# Patient Record
Sex: FEMALE | Race: WHITE | Employment: FULL TIME
[De-identification: names, ages, dates, MRNs, and addresses within clinical notes are randomized per-mention and may not be internally consistent; named-entity substitution may affect disease eponyms.]

---

## 2017-07-01 ENCOUNTER — HEALTH MAINTENANCE LETTER (OUTPATIENT)
Age: 22
End: 2017-07-01

## 2017-07-18 ENCOUNTER — OFFICE VISIT (OUTPATIENT)
Dept: FAMILY MEDICINE | Facility: CLINIC | Age: 22
End: 2017-07-18

## 2017-07-18 VITALS
BODY MASS INDEX: 28.76 KG/M2 | OXYGEN SATURATION: 98 % | SYSTOLIC BLOOD PRESSURE: 112 MMHG | HEIGHT: 65 IN | HEART RATE: 85 BPM | DIASTOLIC BLOOD PRESSURE: 76 MMHG | TEMPERATURE: 98.7 F | WEIGHT: 172.6 LBS | RESPIRATION RATE: 16 BRPM

## 2017-07-18 DIAGNOSIS — Z30.9 ENCOUNTER FOR CONTRACEPTIVE MANAGEMENT, UNSPECIFIED TYPE: ICD-10-CM

## 2017-07-18 DIAGNOSIS — Z11.3 ENCOUNTER FOR SCREENING EXAMINATION FOR SEXUALLY TRANSMITTED DISEASE: Primary | ICD-10-CM

## 2017-07-18 DIAGNOSIS — Z00.00 ROUTINE GENERAL MEDICAL EXAMINATION AT A HEALTH CARE FACILITY: ICD-10-CM

## 2017-07-18 LAB — HCG UR QL: NEGATIVE

## 2017-07-18 RX ORDER — NORETHINDRONE ACETATE AND ETHINYL ESTRADIOL .02; 1 MG/1; MG/1
1 TABLET ORAL
COMMUNITY
Start: 2017-05-24 | End: 2017-07-18

## 2017-07-18 RX ORDER — NORETHINDRONE ACETATE AND ETHINYL ESTRADIOL .02; 1 MG/1; MG/1
1 TABLET ORAL DAILY
Qty: 28 TABLET | Refills: 3 | Status: SHIPPED | OUTPATIENT
Start: 2017-07-18 | End: 2018-02-20

## 2017-07-18 NOTE — PROGRESS NOTES
Preceptor Attestation:   Patient seen and discussed with the resident. Assessment and plan reviewed with resident and agreed upon.   Supervising Physician:  Tatianna Dela Cruz MD  [facility]

## 2017-07-18 NOTE — MR AVS SNAPSHOT
After Visit Summary   7/18/2017    Chelo Garcia    MRN: [id number]           Patient Information     Date Of Birth          1995        Visit Information        Provider Department      7/18/2017 2:00 PM Sena Elise MD [facility]        Today's Diagnoses     Encounter for screening examination for sexually transmitted disease    -  1    Routine general medical examination at a health care facility        Encounter for contraceptive management, unspecified type          Care Instructions    Here is the plan from today's visit    1. Encounter for screening examination for sexually transmitted disease  - Neisseria gonorrhoeae PCR  - Chlamydia trachomatis PCR  - Anti Treponema  - HIV Antigen Antibody Combo  - HCG Qualitative Urine (UPT) ([facility])    2. Routine general medical examination at a health care facility  - TDAP VACCINE (BOOSTRIX)    3. Encounter for contraceptive management, unspecified type        Follow up plan  Follow-up as needed if the back pain and dizziness don't go away.    Thank you for coming to [facility] today.  Lab Testing:  **If you had lab testing today and your results are reassuring or normal they will be mailed to you or sent through [employer] within 7 days.   **If the lab tests need quick action we will call you with the results.  The phone number we will call with results is # [phone] (home) . If this is not the best number please call our clinic and change the number.  Medication Refills:  If you need any refills please call your pharmacy and they will contact us.   If you need to  your refill at a new pharmacy, please contact the new pharmacy directly. The new pharmacy will help you get your medications transferred faster.   Scheduling:  If you have any concerns about today's visit or wish to schedule another appointment please call our office during normal business hours [phone] (8-5:00 M-F)  If a referral was  made to a [facility] and you don't get a call from central scheduling please call [phone].  If a Mammogram was ordered for you at [facility] call [phone] to schedule or change your appointment.  If you had an XRay/CT/Ultrasound/MRI ordered the number is [phone] to schedule or change your radiology appointment.   Medical Concerns:  If you have urgent medical concerns please call [phone] at any time of the day.    Preventive Health Recommendations  Female Ages 18 to 25     Yearly exam:     See your health care provider every year in order to  o Review health changes.   o Discuss preventive care.    o Review your medicines if your doctor has prescribed any.      You should be tested each year for STDs (sexually transmitted diseases).       After age 20, talk to your provider about how often you should have cholesterol testing.      Starting at age 21, get a Pap test every three years. If you have an abnormal result, your doctor may have you test more often.      If you are at risk for diabetes, you should have a diabetes test (fasting glucose).     Shots:     Get a flu shot each year.     Get a tetanus shot every 10 years.     Consider getting the shot (vaccine) that prevents cervical cancer (Gardasil).    Nutrition:     Eat at least 5 servings of fruits and vegetables each day.    Eat whole-grain bread, whole-wheat pasta and brown rice instead of white grains and rice.    Talk to your provider about Calcium and Vitamin D.     Lifestyle    Exercise at least 150 minutes a week each week (30 minutes a day, 5 days a week). This will help you control your weight and prevent disease.    Limit alcohol to one drink per day.    No smoking.     Wear sunscreen to prevent skin cancer.    See your dentist every six months for an exam and cleaning.          Follow-ups after your visit        Who to contact     Please call your clinic at [phone] to:    Ask questions about your  "health    Make or cancel appointments    Discuss your medicines    Learn about your test results    Speak to your doctor   If you have compliments or concerns about an experience at your clinic, or if you wish to file a complaint, please contact [facility] Patient Relations at [phone] or email us at [email or web address]         Additional Information About Your Visit        [employer] Information     [employer] gives you secure access to your electronic health record. If you see a primary care provider, you can also send messages to your care team and make appointments. If you have questions, please call your primary care clinic.  If you do not have a primary care provider, please call [phone] and they will assist you.      [employer] is an electronic gateway that provides easy, online access to your medical records. With [employer], you can request a clinic appointment, read your test results, renew a prescription or communicate with your care team.     To access your existing account, please contact your [facility] or call [phone] for assistance.        Care EveryWhere ID     This is your Care EveryWhere ID. This could be used by other organizations to access your [facility] medical records  [id number]        Your Vitals Were     Pulse Temperature Respirations Height Pulse Oximetry BMI (Body Mass Index)    85 98.7  F (37.1  C) (Oral) 16 5' 4.5\" (163.8 cm) 98% 29.17 kg/m2       Blood Pressure from Last 3 Encounters:   07/18/17 112/76   07/28/15 122/73   06/11/13 132/85    Weight from Last 3 Encounters:   07/18/17 172 lb 9.6 oz (78.3 kg)   07/28/15 170 lb 9.6 oz (77.4 kg)   06/11/13 169 lb (76.7 kg) (93 %)*     * Growth percentiles are based on CDC 2-20 Years data.              Today, you had the following     No orders found for display         Today's Medication Changes          These changes are accurate as of: 7/18/17  3:26 PM.  If you have " any questions, ask your nurse or doctor.               Stop taking these medicines if you haven't already. Please contact your care team if you have questions.     etonogestrel 68 MG Impl   Commonly known as:  IMPLANON/NEXPLANON   Stopped by:  Sena Elise MD                    Primary Care Provider Office Phone # Fax #    Berkley Zhang [phone] [address] [facility] [address]        Equal Access to Services     [facility]: Hadii aad ku hadasho Soomaali, waaxda luqadaha, qaybta kaalmada adeegyada, waxay idiin hayaan adeeg karyna cuellar . So [facility] [phone].    ATENCIÓN: Si habla español, tiene a easley disposición servicios gratuitos de asistencia lingüística. [facility] [phone].    We comply with applicable federal civil rights laws and [place] laws. We do not discriminate on the basis of race, color, national origin, age, disability sex, sexual orientation or gender identity.            Thank you!     Thank you for choosing [facility]  for your care. Our goal is always to provide you with excellent care. Hearing back from our patients is one way we can continue to improve our services. Please take a few minutes to complete the written survey that you may receive in the mail after your visit with us. Thank you!             Your Updated Medication List - Protect others around you: Learn how to safely use, store and throw away your medicines at www.disposemymeds.org.          This list is accurate as of: 7/18/17  3:26 PM.  Always use your most recent med list.                   Brand Name Dispense Instructions for use Diagnosis    MICROGESTIN 1-20 MG-MCG per tablet   Generic drug:  norethindrone-ethinyl estradiol      Take 1 tablet by mouth

## 2017-07-18 NOTE — PATIENT INSTRUCTIONS
Here is the plan from today's visit    1. Encounter for screening examination for sexually transmitted disease  - Neisseria gonorrhoeae PCR  - Chlamydia trachomatis PCR  - Anti Treponema  - HIV Antigen Antibody Combo  - HCG Qualitative Urine (UPT) ([facility])    2. Routine general medical examination at a health care facility  - TDAP VACCINE (BOOSTRIX)    3. Encounter for contraceptive management, unspecified type        Follow up plan  Follow-up as needed if the back pain and dizziness don't go away.    Thank you for coming to [facility] today.  Lab Testing:  **If you had lab testing today and your results are reassuring or normal they will be mailed to you or sent through [employer] within 7 days.   **If the lab tests need quick action we will call you with the results.  The phone number we will call with results is # [phone] (home) . If this is not the best number please call our clinic and change the number.  Medication Refills:  If you need any refills please call your pharmacy and they will contact us.   If you need to  your refill at a new pharmacy, please contact the new pharmacy directly. The new pharmacy will help you get your medications transferred faster.   Scheduling:  If you have any concerns about today's visit or wish to schedule another appointment please call our office during normal business hours [phone] (8-5:00 M-F)  If a referral was made to a [facility] and you don't get a call from central scheduling please call [phone].  If a Mammogram was ordered for you at [facility] call [phone] to schedule or change your appointment.  If you had an XRay/CT/Ultrasound/MRI ordered the number is [phone] to schedule or change your radiology appointment.   Medical Concerns:  If you have urgent medical concerns please call [phone] at any time of the day.    Preventive Health Recommendations  Female Ages 18 to 25     Yearly exam:     See your  health care provider every year in order to  o Review health changes.   o Discuss preventive care.    o Review your medicines if your doctor has prescribed any.      You should be tested each year for STDs (sexually transmitted diseases).       After age 20, talk to your provider about how often you should have cholesterol testing.      Starting at age 21, get a Pap test every three years. If you have an abnormal result, your doctor may have you test more often.      If you are at risk for diabetes, you should have a diabetes test (fasting glucose).     Shots:     Get a flu shot each year.     Get a tetanus shot every 10 years.     Consider getting the shot (vaccine) that prevents cervical cancer (Gardasil).    Nutrition:     Eat at least 5 servings of fruits and vegetables each day.    Eat whole-grain bread, whole-wheat pasta and brown rice instead of white grains and rice.    Talk to your provider about Calcium and Vitamin D.     Lifestyle    Exercise at least 150 minutes a week each week (30 minutes a day, 5 days a week). This will help you control your weight and prevent disease.    Limit alcohol to one drink per day.    No smoking.     Wear sunscreen to prevent skin cancer.    See your dentist every six months for an exam and cleaning.

## 2017-07-18 NOTE — PROGRESS NOTES
Female Physical Note          HPI         Concerns today: STI testing. Wants to talk about back pain and also has random bouts of nausea.      Nausea/Dizziness:  Has occasional bouts of significant nausea that she feels in her throat. Often feels like she might throw up. Has been ongoing for years. No noticed pattern of triggers. Drinking water helps. Also has frequent motion sickness.     Back Pain:  Lower back pain. Ongoing for years. Often wakes up with it. Does have a back brace and heating pad. No heavy lifting at work. No obvious mechanism. Soreness and stiffness; paralumbar bilaterally.    STI testing:  Sexually active with male partners. Inconsistent condom use. No dysuria, discharge, irritation, bleeding. LMP ~1 month ago    Patient Active Problem List   Diagnosis     Proteinuria     Nexplanon in place       Past Medical History:   Diagnosis Date     Proteinuria     small protein on screening UA - recheck       Previous Medical Care - Also got care at [facility].     Family History   Problem Relation Age of Onset     Ovarian Cancer Mother      Breast Cancer Other               Review of Systems:     Review of Systems:  CONSTITUTIONAL: NEGATIVE for fever, chills, change in weight  INTEGUMENTARY/SKIN: NEGATIVE for worrisome rashes, moles or lesions  EYES: NEGATIVE for vision changes or irritation  ENT/MOUTH: NEGATIVE for ear, mouth and throat problems  RESP: NEGATIVE for significant cough or SOB  BREAST: NEGATIVE for masses, tenderness or discharge  CV: NEGATIVE for chest pain, palpitations or peripheral edema  GI: NEGATIVE for nausea, abdominal pain, heartburn, or change in bowel habits  : NEGATIVE for frequency, dysuria, or hematuria  MUSCULOSKELETAL: NEGATIVE for significant arthralgias or myalgia  NEURO: NEGATIVE for weakness, or paresthesias. POSITIVE FOR DIZZINESS/nausea.  ENDOCRINE: NEGATIVE for temperature intolerance, skin/hair changes  HEME/ALLERGY: NEGATIVE for bleeding problems  PSYCHIATRIC:  "NEGATIVE for changes in mood or affect           Social History     Social History     Social History     Marital status: Single     Spouse name: N/A     Number of children: N/A     Years of education: N/A     Occupational History     Not on file.     Social History Main Topics     Smoking status: Never Smoker     Smokeless tobacco: Not on file     Alcohol use Not on file     Drug use: Not on file     Sexual activity: Yes     Partners: Male     Other Topics Concern     Not on file     Social History Narrative    Graduated from [employer] major, 2017. 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       Marital Status: Single  Who lives in your household? Lives with grandparents.    Has anyone hurt you physically, for example by pushing, hitting, slapping or kicking you or forcing you to have sex? Denies  Do you feel threatened or controlled by a partner, ex-partner or anyone in your life? Denies    Sexual Health     Sexual concerns: No   STI History: Neg  Pregnancy History: No obstetric history on file. Never been pregnant.  LMP No LMP recorded. Patient has had an injection. Menses: q 4-5 days.   Last Pap Smear Date: Has had two (one before Nexplanon, one last year). All NIL.  Abnormal Pap History: None    Birth control: currently on pill.     Recommended Screening     HIV screening:  Recommended and patient accepted testing.         Physical Exam:     Vitals: /76  Pulse 85  Temp 98.7  F (37.1  C) (Oral)  Resp 16  Ht 5' 4.5\" (163.8 cm)  Wt 172 lb 9.6 oz (78.3 kg)  SpO2 98%  BMI 29.17 kg/m2  BMI= Body mass index is 29.17 kg/(m^2).     GENERAL: healthy, alert and no distress  EYES: Eyes grossly normal to inspection, extraocular movements - intact, and PERRL  HENT: ear canals- normal; TMs- normal; Nose- normal; Mouth- no ulcers, no lesions  RESP: lungs clear to auscultation - no rales, no rhonchi, no wheezes  CV: regular rates and rhythm, normal S1 S2, no S3 or S4 and no murmur, no " click or rub -  ABDOMEN: soft, no tenderness, no  hepatosplenomegaly, no masses, normal bowel sounds  : deferred today  MS: extremities- no gross deformities noted, no edema  SKIN: no suspicious lesions, no rashes  NEURO: strength and tone- normal, sensory exam- grossly normal, mentation- intact, speech- normal, reflexes- symmetric  BACK: no CVA tenderness, no paralumbar tenderness. No obvious spinal curvature.   PSYCH: Alert and oriented times 3; speech- coherent , normal rate and volume; able to articulate logical thoughts, able to abstract reason, no tangential thoughts, no hallucinations or delusions, affect- normal      Assessment and Plan      Chelo was seen today for physical.    Diagnoses and all orders for this visit:    Encounter for screening examination for sexually transmitted disease  -     Neisseria gonorrhoeae PCR  -     Chlamydia trachomatis PCR  -     Anti Treponema  -     HIV Antigen Antibody Combo  -     HCG Qualitative Urine (UPT) ([facility])  -     ADMIN VACCINE, INITIAL  -     TDAP VACCINE (BOOSTRIX)    Routine general medical examination at a health care facility  -     Cancel: TDAP VACCINE (BOOSTRIX)    Encounter for contraceptive management, unspecified type  -     norethindrone-ethinyl estradiol (MICROGESTIN) 1-20 MG-MCG per tablet; Take 1 tablet by mouth daily        1. Health Care Maintenance: Normal Physical Exam. TDAP today.  2. STI testing: G/C, HIV, RPR, and UPT ordered for today.      1. Routine follow up in one year.  2.Contraception: OCP-see med list    Options for treatment and follow-up care were reviewed with the patient . Chelo Garcia and/or guardian engaged in the decision making process and verbalized understanding of the options discussed and agreed with the final plan.    Ethan VENTURA, M4, am serving as a scribe; to document services personally performed by Dr.Jennifer Lanie MD and resident Dr. Leandro Elise MD based on data collection and providers  statements to me. The above assessment and plan is the product of our joint decision making.    The medical student acted as scribe and the encounter documented above was completely performed by myself and the documentation reflects the work I have performed today.    Sena Elise MD  [facility],  G2 Family Medicine Resident  #[phone]

## 2017-07-19 LAB
C TRACH DNA SPEC QL NAA+PROBE: NORMAL
HIV 1+2 AB+HIV1 P24 AG SERPL QL IA: NORMAL
N GONORRHOEA DNA SPEC QL NAA+PROBE: NORMAL
SPECIMEN SOURCE: NORMAL
SPECIMEN SOURCE: NORMAL
T PALLIDUM IGG+IGM SER QL: NEGATIVE

## 2017-07-24 PROBLEM — M54.50 CHRONIC MIDLINE LOW BACK PAIN WITHOUT SCIATICA: Status: ACTIVE | Noted: 2017-07-24

## 2017-07-24 PROBLEM — G89.29 CHRONIC MIDLINE LOW BACK PAIN WITHOUT SCIATICA: Status: ACTIVE | Noted: 2017-07-24

## 2017-07-24 PROBLEM — E66.3 OVERWEIGHT: Status: ACTIVE | Noted: 2017-07-24

## 2018-01-29 ENCOUNTER — OFFICE VISIT (OUTPATIENT)
Dept: FAMILY MEDICINE | Facility: CLINIC | Age: 23
End: 2018-01-29
Payer: COMMERCIAL

## 2018-01-29 VITALS
HEART RATE: 93 BPM | HEIGHT: 64 IN | WEIGHT: 179.75 LBS | SYSTOLIC BLOOD PRESSURE: 121 MMHG | TEMPERATURE: 98.2 F | DIASTOLIC BLOOD PRESSURE: 85 MMHG | OXYGEN SATURATION: 99 % | BODY MASS INDEX: 30.69 KG/M2 | RESPIRATION RATE: 18 BRPM

## 2018-01-29 DIAGNOSIS — M54.50 CHRONIC MIDLINE LOW BACK PAIN WITHOUT SCIATICA: ICD-10-CM

## 2018-01-29 DIAGNOSIS — Z11.3 SCREEN FOR STD (SEXUALLY TRANSMITTED DISEASE): ICD-10-CM

## 2018-01-29 DIAGNOSIS — F41.9 ANXIETY: ICD-10-CM

## 2018-01-29 DIAGNOSIS — Z80.41 FAMILY HISTORY OF MALIGNANT NEOPLASM OF OVARY: ICD-10-CM

## 2018-01-29 DIAGNOSIS — E66.3 OVERWEIGHT: ICD-10-CM

## 2018-01-29 DIAGNOSIS — K64.4 RESIDUAL HEMORRHOIDAL SKIN TAGS: ICD-10-CM

## 2018-01-29 DIAGNOSIS — Z13.6 ENCOUNTER FOR SCREENING FOR CARDIOVASCULAR DISORDERS: ICD-10-CM

## 2018-01-29 DIAGNOSIS — Z13.1 SCREENING FOR DIABETES MELLITUS: ICD-10-CM

## 2018-01-29 DIAGNOSIS — R80.9 PROTEINURIA, UNSPECIFIED TYPE: ICD-10-CM

## 2018-01-29 DIAGNOSIS — F33.9 RECURRENT MAJOR DEPRESSIVE DISORDER, REMISSION STATUS UNSPECIFIED (H): ICD-10-CM

## 2018-01-29 DIAGNOSIS — G89.29 CHRONIC MIDLINE LOW BACK PAIN WITHOUT SCIATICA: ICD-10-CM

## 2018-01-29 DIAGNOSIS — Z13.0 SCREENING, ANEMIA, DEFICIENCY, IRON: ICD-10-CM

## 2018-01-29 DIAGNOSIS — Z00.00 ROUTINE GENERAL MEDICAL EXAMINATION AT A HEALTH CARE FACILITY: Primary | ICD-10-CM

## 2018-01-29 LAB
ALBUMIN SERPL-MCNC: 3.4 G/DL (ref 3.4–5)
ALBUMIN UR-MCNC: NEGATIVE MG/DL
ALP SERPL-CCNC: 80 U/L (ref 40–150)
ALT SERPL W P-5'-P-CCNC: 16 U/L (ref 0–50)
ANION GAP SERPL CALCULATED.3IONS-SCNC: 8 MMOL/L (ref 3–14)
APPEARANCE UR: CLEAR
AST SERPL W P-5'-P-CCNC: 17 U/L (ref 0–45)
BASOPHILS # BLD AUTO: 0 10E9/L (ref 0–0.2)
BASOPHILS NFR BLD AUTO: 0.2 %
BILIRUB SERPL-MCNC: 0.2 MG/DL (ref 0.2–1.3)
BILIRUB UR QL STRIP: NEGATIVE
BUN SERPL-MCNC: 12 MG/DL (ref 7–30)
CALCIUM SERPL-MCNC: 8.5 MG/DL (ref 8.5–10.1)
CHLORIDE SERPL-SCNC: 111 MMOL/L (ref 94–109)
CHOLEST SERPL-MCNC: 215 MG/DL
CO2 SERPL-SCNC: 21 MMOL/L (ref 20–32)
COLOR UR AUTO: YELLOW
CREAT SERPL-MCNC: 0.75 MG/DL (ref 0.52–1.04)
CREAT UR-MCNC: 236 MG/DL
DIFFERENTIAL METHOD BLD: NORMAL
EOSINOPHIL # BLD AUTO: 0 10E9/L (ref 0–0.7)
EOSINOPHIL NFR BLD AUTO: 0.7 %
ERYTHROCYTE [DISTWIDTH] IN BLOOD BY AUTOMATED COUNT: 14.7 % (ref 10–15)
GFR SERPL CREATININE-BSD FRML MDRD: >90 ML/MIN/1.7M2
GLUCOSE SERPL-MCNC: 86 MG/DL (ref 70–99)
GLUCOSE UR STRIP-MCNC: NEGATIVE MG/DL
HBA1C MFR BLD: 5.1 % (ref 4.3–6)
HCT VFR BLD AUTO: 39.1 % (ref 35–47)
HCV AB SERPL QL IA: NONREACTIVE
HDLC SERPL-MCNC: 68 MG/DL
HGB BLD-MCNC: 12.5 G/DL (ref 11.7–15.7)
HGB UR QL STRIP: NEGATIVE
KETONES UR STRIP-MCNC: NEGATIVE MG/DL
LDLC SERPL CALC-MCNC: 127 MG/DL
LEUKOCYTE ESTERASE UR QL STRIP: NEGATIVE
LYMPHOCYTES # BLD AUTO: 1.8 10E9/L (ref 0.8–5.3)
LYMPHOCYTES NFR BLD AUTO: 30.3 %
MCH RBC QN AUTO: 27.1 PG (ref 26.5–33)
MCHC RBC AUTO-ENTMCNC: 32 G/DL (ref 31.5–36.5)
MCV RBC AUTO: 85 FL (ref 78–100)
MICROALBUMIN UR-MCNC: 10 MG/L
MICROALBUMIN/CREAT UR: 4.41 MG/G CR (ref 0–25)
MONOCYTES # BLD AUTO: 0.3 10E9/L (ref 0–1.3)
MONOCYTES NFR BLD AUTO: 4.3 %
NEUTROPHILS # BLD AUTO: 3.9 10E9/L (ref 1.6–8.3)
NEUTROPHILS NFR BLD AUTO: 64.5 %
NITRATE UR QL: NEGATIVE
NONHDLC SERPL-MCNC: 147 MG/DL
PH UR STRIP: 5.5 PH (ref 5–7)
PLATELET # BLD AUTO: 247 10E9/L (ref 150–450)
POTASSIUM SERPL-SCNC: 4.1 MMOL/L (ref 3.4–5.3)
PROT SERPL-MCNC: 7.1 G/DL (ref 6.8–8.8)
RBC # BLD AUTO: 4.61 10E12/L (ref 3.8–5.2)
SODIUM SERPL-SCNC: 140 MMOL/L (ref 133–144)
SOURCE: NORMAL
SP GR UR STRIP: >1.03 (ref 1–1.03)
SPECIMEN SOURCE: ABNORMAL
TRIGL SERPL-MCNC: 98 MG/DL
TSH SERPL DL<=0.005 MIU/L-ACNC: 0.96 MU/L (ref 0.4–4)
UROBILINOGEN UR STRIP-ACNC: 0.2 EU/DL (ref 0.2–1)
WBC # BLD AUTO: 6.1 10E9/L (ref 4–11)
WET PREP SPEC: ABNORMAL

## 2018-01-29 PROCEDURE — 86780 TREPONEMA PALLIDUM: CPT | Performed by: FAMILY MEDICINE

## 2018-01-29 PROCEDURE — 80061 LIPID PANEL: CPT | Performed by: FAMILY MEDICINE

## 2018-01-29 PROCEDURE — 87210 SMEAR WET MOUNT SALINE/INK: CPT | Performed by: FAMILY MEDICINE

## 2018-01-29 PROCEDURE — 87340 HEPATITIS B SURFACE AG IA: CPT | Performed by: FAMILY MEDICINE

## 2018-01-29 PROCEDURE — 36415 COLL VENOUS BLD VENIPUNCTURE: CPT | Performed by: FAMILY MEDICINE

## 2018-01-29 PROCEDURE — 99395 PREV VISIT EST AGE 18-39: CPT | Performed by: FAMILY MEDICINE

## 2018-01-29 PROCEDURE — 86803 HEPATITIS C AB TEST: CPT | Performed by: FAMILY MEDICINE

## 2018-01-29 PROCEDURE — 87389 HIV-1 AG W/HIV-1&-2 AB AG IA: CPT | Performed by: FAMILY MEDICINE

## 2018-01-29 PROCEDURE — 87491 CHLMYD TRACH DNA AMP PROBE: CPT | Performed by: FAMILY MEDICINE

## 2018-01-29 PROCEDURE — 99213 OFFICE O/P EST LOW 20 MIN: CPT | Mod: 25 | Performed by: FAMILY MEDICINE

## 2018-01-29 PROCEDURE — 86706 HEP B SURFACE ANTIBODY: CPT | Performed by: FAMILY MEDICINE

## 2018-01-29 PROCEDURE — 80050 GENERAL HEALTH PANEL: CPT | Performed by: FAMILY MEDICINE

## 2018-01-29 PROCEDURE — 83036 HEMOGLOBIN GLYCOSYLATED A1C: CPT | Performed by: FAMILY MEDICINE

## 2018-01-29 PROCEDURE — 87591 N.GONORRHOEAE DNA AMP PROB: CPT | Performed by: FAMILY MEDICINE

## 2018-01-29 PROCEDURE — 82043 UR ALBUMIN QUANTITATIVE: CPT | Performed by: FAMILY MEDICINE

## 2018-01-29 PROCEDURE — 81003 URINALYSIS AUTO W/O SCOPE: CPT | Performed by: FAMILY MEDICINE

## 2018-01-29 ASSESSMENT — ANXIETY QUESTIONNAIRES
6. BECOMING EASILY ANNOYED OR IRRITABLE: NOT AT ALL
GAD7 TOTAL SCORE: 4
5. BEING SO RESTLESS THAT IT IS HARD TO SIT STILL: NOT AT ALL
1. FEELING NERVOUS, ANXIOUS, OR ON EDGE: SEVERAL DAYS
3. WORRYING TOO MUCH ABOUT DIFFERENT THINGS: SEVERAL DAYS
2. NOT BEING ABLE TO STOP OR CONTROL WORRYING: SEVERAL DAYS
7. FEELING AFRAID AS IF SOMETHING AWFUL MIGHT HAPPEN: NOT AT ALL
IF YOU CHECKED OFF ANY PROBLEMS ON THIS QUESTIONNAIRE, HOW DIFFICULT HAVE THESE PROBLEMS MADE IT FOR YOU TO DO YOUR WORK, TAKE CARE OF THINGS AT HOME, OR GET ALONG WITH OTHER PEOPLE: SOMEWHAT DIFFICULT

## 2018-01-29 ASSESSMENT — PATIENT HEALTH QUESTIONNAIRE - PHQ9
5. POOR APPETITE OR OVEREATING: SEVERAL DAYS
SUM OF ALL RESPONSES TO PHQ QUESTIONS 1-9: 13
SUM OF ALL RESPONSES TO PHQ QUESTIONS 1-9: 13
10. IF YOU CHECKED OFF ANY PROBLEMS, HOW DIFFICULT HAVE THESE PROBLEMS MADE IT FOR YOU TO DO YOUR WORK, TAKE CARE OF THINGS AT HOME, OR GET ALONG WITH OTHER PEOPLE: SOMEWHAT DIFFICULT

## 2018-01-29 NOTE — MR AVS SNAPSHOT
"              After Visit Summary   1/29/2018    Chelo Garcia    MRN: [id number]           Patient Information     Date Of Birth          1995        Visit Information        Provider Department      1/29/2018 9:40 AM Andria Pearce MD [facility]        Today's Diagnoses     Routine general medical examination at a health care facility    -  1    Overweight        Chronic midline low back pain without sciatica        Proteinuria, unspecified type        Recurrent major depressive disorder, remission status unspecified (H)        Anxiety        Screen for STD (sexually transmitted disease)        Screening, anemia, deficiency, iron        Screening for diabetes mellitus        Encounter for screening for cardiovascular disorders        Residual hemorrhoidal skin tags        Family history of malignant neoplasm of ovary          Care Instructions    Have a residual external hemorrhoid skin tag  Should shrink on its on or remain that way   Is not a wart  Monitor for worsening  If remains worried or gets symptoms see colorectal, referral placed  Advise decreased sitting, avoid being on toilet long, increase fiber and fluid in diet , use wet wipes after a BM  Labs today   Std screen  Pap due 2019  breast exam normal  consider meds and therapy   Referral placed  Increase aerobic activity to 30 min 5 days a week  Eat healthy   Consider therapy - CBT   Konrad Stewart's card given to patient. Encouraged follow up with Konrad Stewart, Behavioral Health Consultant.  Vitamin D 2000 IU daily , also recommend a multivitamin, and fish oil daily  Avoid Caffeine and alcohol   Valerian root extract for relaxation and sleep OR Melatonin at bedtime.    \"The Chemistry of Calm\" by Nick Austin . Also \"Chemistry of Mandy\" book and Work book by same author  \"Hope and Help for your Nerves\" by Teresa Bryan   If you are experiencing a mental health crisis call the crisis response provider in your community :  Alin: Adult " [phone] children [phone]  Iron: Adult [phone] children [phone]  In a life threatening emergency , call 911     Otherwise if need help for urgent mental health please go to the Behavioral emergency Center at [facility]     Preventive Health Recommendations  Female Ages 18 to 25     Yearly exam:     See your health care provider every year in order to  o Review health changes.   o Discuss preventive care.    o Review your medicines if your doctor has prescribed any.      You should be tested each year for STDs (sexually transmitted diseases).       After age 20, talk to your provider about how often you should have cholesterol testing.      Starting at age 21, get a Pap test every three years. If you have an abnormal result, your doctor may have you test more often.      If you are at risk for diabetes, you should have a diabetes test (fasting glucose).     Shots:     Get a flu shot each year.     Get a tetanus shot every 10 years.     Consider getting the shot (vaccine) that prevents cervical cancer (Gardasil).    Nutrition:     Eat at least 5 servings of fruits and vegetables each day.    Eat whole-grain bread, whole-wheat pasta and brown rice instead of white grains and rice.    Talk to your provider about Calcium and Vitamin D.     Lifestyle    Exercise at least 150 minutes a week each week (30 minutes a day, 5 days a week). This will help you control your weight and prevent disease.    Limit alcohol to one drink per day.    No smoking.     Wear sunscreen to prevent skin cancer.    See your dentist every six months for an exam and cleaning.          Follow-ups after your visit        Additional Services     COLORECTAL SURGERY REFERRAL       Your provider has referred you to: [facility]: [facility] [place] [phone]   [email or web address]  [facility]: [facility] [place] [facility]  [phone]  [email or web address]  [facility]: [facility] [place] [phone]   [email or web address]  [facility]: [facility] [phone]   [email or web address]    Referral Reason(s): Hemorrhoids  Special Concerns: None  This referral is: Elective (week +)  It is OK to leave a message on patient's voicemail.    Please be aware that coverage of these services is subject to the terms and limitations of your health insurance plan.  Call member services at your health plan with any benefit or coverage questions.      Please bring the following with you to your appointment:    (1) Any X-Rays, CTs or MRIs which have been performed.  Contact the facility where they were done to arrange for  prior to your scheduled appointment.    (2) List of current medications  (3) This referral request   (4) Any documents/labs given to you for this referral            MENTAL HEALTH REFERRAL  - Adult; Psychiatry and Medication Management, Outpatient Treatment; Individual/Couples/Family/Group Therapy/Health Psychology; [facility]: [facility] [phone]; We will contact you to schedule the appointmen...       All scheduling is subject to the client's specific insurance plan & benefits, provider/location availability, and provider clinical specialities.  Please arrive 15 minutes early for your first appointment and bring your completed paperwork.    Please be aware that coverage of these services is subject to the terms and limitations of your health insurance plan.  Call member services at your health plan with any benefit or coverage questions.                            Who to contact     If you have questions or need follow up information about today's clinic visit or your schedule please contact [facility] directly at  "[phone].  Normal or non-critical lab and imaging results will be communicated to you by Shopventoryhart, letter or phone within 4 business days after the clinic has received the results. If you do not hear from us within 7 days, please contact the clinic through D[employer]s Fund[employer] or phone. If you have a critical or abnormal lab result, we will notify you by phone as soon as possible.  Submit refill requests through Cenzic or call your pharmacy and they will forward the refill request to us. Please allow 3 business days for your refill to be completed.          Additional Information About Your Visit        Shopventoryhart Information     Cenzic gives you secure access to your electronic health record. If you see a primary care provider, you can also send messages to your care team and make appointments. If you have questions, please call your primary care clinic.  If you do not have a primary care provider, please call [phone] and they will assist you.        Care EveryWhere ID     This is your Care EveryWhere ID. This could be used by other organizations to access your [place] medical records  [id number]        Your Vitals Were     Pulse Temperature Respirations Height Last Period Pulse Oximetry    93 98.2  F (36.8  C) (Oral) 18 5' 4\" (1.626 m) 12/29/2017 99%    BMI (Body Mass Index)                   30.85 kg/m2            Blood Pressure from Last 3 Encounters:   01/29/18 121/85   07/18/17 112/76   07/28/15 122/73    Weight from Last 3 Encounters:   01/29/18 179 lb 12 oz (81.5 kg)   07/18/17 172 lb 9.6 oz (78.3 kg)   07/28/15 170 lb 9.6 oz (77.4 kg)              We Performed the Following     Albumin Random Urine Quantitative with Creat Ratio     Anti Treponema     CBC with platelets differential     CHLAMYDIA TRACHOMATIS PCR     COLORECTAL SURGERY REFERRAL     Comprehensive metabolic panel     Hemoglobin A1c     Hepatitis B Surface Antibody     Hepatitis B surface antigen     Hepatitis C Screen Reflex to HCV RNA Quant and " Genotype     HIV Antigen Antibody Combo     Lipid panel reflex to direct LDL Fasting     MENTAL HEALTH REFERRAL  - Adult; Psychiatry and Medication Management, Outpatient Treatment; Individual/Couples/Family/Group Therapy/Health Psychology; [facility]: [facility] [phone]; We will contact you to schedule the appointmen...     NEISSERIA GONORRHOEA PCR     OFFICE/OUTPT VISIT,EST,LEVL III     TSH with free T4 reflex     UA reflex to Microscopic and Culture     Wet prep        Primary Care Provider Office Phone # Fax #    Berkley Zhang [phone]       [address] [address]        Equal Access to Services     [facility] Hospital[facility] : Hadii dionne todd Sorusty, waaxda lucecilioadaha, qaybta kaalmada fransisco, joey cuellar . So [facility] [phone].    ATENCIÓN: Si habla español, tiene a easley disposición servicios gratuitos de asistencia lingüística. [facility] [phone].    We comply with applicable federal civil rights laws and [place] laws. We do not discriminate on the basis of race, color, national origin, age, disability, sex, sexual orientation, or gender identity.            Thank you!     Thank you for choosing [facility]  for your care. Our goal is always to provide you with excellent care. Hearing back from our patients is one way we can continue to improve our services. Please take a few minutes to complete the written survey that you may receive in the mail after your visit with us. Thank you!             Your Updated Medication List - Protect others around you: Learn how to safely use, store and throw away your medicines at www.disposemymeds.org.          This list is accurate as of 1/29/18 10:08 AM.  Always use your most recent med list.                   Brand Name Dispense Instructions for use Diagnosis    norethindrone-ethinyl estradiol 1-20 MG-MCG per tablet    MICROGESTIN    28 tablet    Take 1 tablet by  mouth daily    Encounter for contraceptive management, unspecified type

## 2018-01-29 NOTE — PROGRESS NOTES
Jarred Ms. Garcia,  Your results came back and are within acceptable limits. -Normal red blood cell (hgb) levels, normal white blood cell count and normal platelet levels.  -A1C (diabetic test) is normal and indicates that your blood sugar has been in a normal range the last 3 months.  -Urine is normal.  -Yeast vaginal infection test is normal - no signs of a yeast infection.  -Bacterial vaginal infection test is POSITIVE.    ADVISE: treatment with metronidazole vaginal gel  0.75% one applicator nightly x 5 nights. Will send to the pharmacy on file for you   -Trichomonas vaginal infection test is normal - no signs of a trichomonas infection.. If you have any further concerns please do not hesitate to contact us by message, phone or making an appointment.  Have a good day   Dr Ramses BARAHONA
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
## 2018-01-29 NOTE — PATIENT INSTRUCTIONS
"Have a residual external hemorrhoid skin tag  Should shrink on its on or remain that way   Is not a wart  Monitor for worsening  If remains worried or gets symptoms see colorectal, referral placed  Advise decreased sitting, avoid being on toilet long, increase fiber and fluid in diet , use wet wipes after a BM  Labs today   Std screen  Pap due 2019  breast exam normal  consider meds and therapy   Referral placed  Increase aerobic activity to 30 min 5 days a week  Eat healthy   Consider therapy - CBT   Konrad Stewart's card given to patient. Encouraged follow up with Konrad Stewart, Behavioral Health Consultant.  Vitamin D 2000 IU daily , also recommend a multivitamin, and fish oil daily  Avoid Caffeine and alcohol   Valerian root extract for relaxation and sleep OR Melatonin at bedtime.    \"The Chemistry of Calm\" by Nick Austin . Also \"Chemistry of Mandy\" book and Work book by same author  \"Hope and Help for your Nerves\" by Teresa Bryan   If you are experiencing a mental health crisis call the crisis response provider in your community :  [place]: Adult [phone] children [phone]  [place]: Adult [phone] children [phone]  In a life threatening emergency , call 911     Otherwise if need help for urgent mental health please go to the Behavioral emergency Center at [facility]     Preventive Health Recommendations  Female Ages 18 to 25     Yearly exam:     See your health care provider every year in order to  o Review health changes.   o Discuss preventive care.    o Review your medicines if your doctor has prescribed any.      You should be tested each year for STDs (sexually transmitted diseases).       After age 20, talk to your provider about how often you should have cholesterol testing.      Starting at age 21, get a Pap test every three years. If you have an abnormal result, your doctor may have you test more often.      If you are at risk for diabetes, you " should have a diabetes test (fasting glucose).     Shots:     Get a flu shot each year.     Get a tetanus shot every 10 years.     Consider getting the shot (vaccine) that prevents cervical cancer (Gardasil).    Nutrition:     Eat at least 5 servings of fruits and vegetables each day.    Eat whole-grain bread, whole-wheat pasta and brown rice instead of white grains and rice.    Talk to your provider about Calcium and Vitamin D.     Lifestyle    Exercise at least 150 minutes a week each week (30 minutes a day, 5 days a week). This will help you control your weight and prevent disease.    Limit alcohol to one drink per day.    No smoking.     Wear sunscreen to prevent skin cancer.    See your dentist every six months for an exam and cleaning.

## 2018-01-29 NOTE — PROGRESS NOTES
Jarred Ms. Garcia,  Your results came back and are within acceptable limits. -Microalbumin (urine protein) test is normal.  ADVISE: recheck annually. If you have any further concerns please do not hesitate to contact us by message, phone or making an appointment.  Have a good day   Dr Ramses BARAHONA

## 2018-01-30 LAB
C TRACH DNA SPEC QL NAA+PROBE: NEGATIVE
HBV SURFACE AB SERPL IA-ACNC: 4.16 M[IU]/ML
HBV SURFACE AG SERPL QL IA: NONREACTIVE
HIV 1+2 AB+HIV1 P24 AG SERPL QL IA: NONREACTIVE
N GONORRHOEA DNA SPEC QL NAA+PROBE: NEGATIVE
SPECIMEN SOURCE: NORMAL
SPECIMEN SOURCE: NORMAL
T PALLIDUM IGG+IGM SER QL: NEGATIVE

## 2018-01-30 ASSESSMENT — ANXIETY QUESTIONNAIRES: GAD7 TOTAL SCORE: 4

## 2018-01-30 NOTE — PROGRESS NOTES
Jarred Ms. Garcia,  Your results came back and are within acceptable limits. -Chlamydia and gonnohrea tests are normal.. If you have any further concerns please do not hesitate to contact us by message, phone or making an appointment.  Have a good day   Dr Ramses BARAHONA

## 2018-01-30 NOTE — PROGRESS NOTES
Jarred Ms. Garcia,  Some of Your results came back and show you are negative for HIV and Hep B infection  You are not immune to hep b so consider revaccination against Hep B with series of three shots. Can set up appt with the Ma to get this done at your convenience. If you have any further concerns please do not hesitate to contact us by message, phone or making an appointment.  Have a good day   Dr Ramses BARAHONA

## 2018-01-30 NOTE — PROGRESS NOTES
Jarred Ms. Garcia,  Your results came back negative for syphilis. If you have any further concerns please do not hesitate to contact us by message, phone or making an appointment.  Have a good day   Dr Ramses BARAHONA

## 2018-01-30 NOTE — PROGRESS NOTES
Jarred Ms. Garcia,  Your results came back and are within acceptable limits. -Hepatitis C antibody screen test shows no signs of a previous hepatitis C infection.. If you have any further concerns please do not hesitate to contact us by message, phone or making an appointment.  Have a good day   Dr Ramses BARAHONA

## 2018-01-31 ENCOUNTER — MYC MEDICAL ADVICE (OUTPATIENT)
Dept: FAMILY MEDICINE | Facility: CLINIC | Age: 23
End: 2018-01-31

## 2018-01-31 DIAGNOSIS — B96.89 BACTERIAL VAGINOSIS: Primary | ICD-10-CM

## 2018-01-31 DIAGNOSIS — N76.0 BACTERIAL VAGINOSIS: Primary | ICD-10-CM

## 2018-02-01 RX ORDER — METRONIDAZOLE 7.5 MG/G
1 GEL VAGINAL AT BEDTIME
Qty: 25 G | Refills: 0 | Status: SHIPPED | OUTPATIENT
Start: 2018-02-01 | End: 2019-02-01

## 2018-02-01 NOTE — TELEPHONE ENCOUNTER
Im so sorry . I put the note in must have forgotten the metrogel order. I just signed it . Thanks .

## 2018-02-01 NOTE — TELEPHONE ENCOUNTER
Metrogel and pharmacy pended.    Dr. Pearce-Please review and sign if agree.    Thank you!  ASHISH KelseyN, RN

## 2018-02-13 ENCOUNTER — MYC MEDICAL ADVICE (OUTPATIENT)
Dept: FAMILY MEDICINE | Facility: CLINIC | Age: 23
End: 2018-02-13

## 2018-02-13 DIAGNOSIS — N76.0 BV (BACTERIAL VAGINOSIS): Primary | ICD-10-CM

## 2018-02-13 DIAGNOSIS — N89.8 VAGINAL ITCHING: ICD-10-CM

## 2018-02-13 DIAGNOSIS — B96.89 BV (BACTERIAL VAGINOSIS): Primary | ICD-10-CM

## 2018-02-14 RX ORDER — FLUCONAZOLE 150 MG/1
150 TABLET ORAL ONCE
Qty: 1 TABLET | Refills: 0 | Status: SHIPPED | OUTPATIENT
Start: 2018-02-14 | End: 2019-02-01

## 2018-02-14 RX ORDER — METRONIDAZOLE 500 MG/1
500 TABLET ORAL 2 TIMES DAILY
Qty: 14 TABLET | Refills: 0 | Status: SHIPPED | OUTPATIENT
Start: 2018-02-14 | End: 2018-05-04

## 2018-02-14 NOTE — TELEPHONE ENCOUNTER
Take flagyl 500 mg twice a day 7 days followed by one dose of diflucan 150 mg x 1. If not better after hat make an apt for a recheck wet prep . Avoid alcohol while on these meds

## 2018-02-20 DIAGNOSIS — Z30.9 ENCOUNTER FOR CONTRACEPTIVE MANAGEMENT, UNSPECIFIED TYPE: ICD-10-CM

## 2018-02-20 NOTE — TELEPHONE ENCOUNTER
Request for medication refill:    Date of last visit at clinic: 01/29/2018    Please complete refill if appropriate and CLOSE ENCOUNTER.    Closing the encounter signifies the refill is complete.    If refill has been denied, please complete the smart phrase .smirefuse and route it to the [facility] RN TRIAGE pool to inform the patient and the pharmacy.    Peng Thompson, CMA

## 2018-02-21 ENCOUNTER — OFFICE VISIT (OUTPATIENT)
Dept: OBGYN | Facility: CLINIC | Age: 23
End: 2018-02-21
Payer: COMMERCIAL

## 2018-02-21 VITALS
BODY MASS INDEX: 31.07 KG/M2 | HEIGHT: 64 IN | SYSTOLIC BLOOD PRESSURE: 120 MMHG | TEMPERATURE: 99 F | DIASTOLIC BLOOD PRESSURE: 70 MMHG | WEIGHT: 182 LBS

## 2018-02-21 DIAGNOSIS — Z30.9 ENCOUNTER FOR CONTRACEPTIVE MANAGEMENT, UNSPECIFIED TYPE: ICD-10-CM

## 2018-02-21 PROCEDURE — 99202 OFFICE O/P NEW SF 15 MIN: CPT | Performed by: OBSTETRICS & GYNECOLOGY

## 2018-02-21 RX ORDER — NORETHINDRONE ACETATE AND ETHINYL ESTRADIOL .02; 1 MG/1; MG/1
1 TABLET ORAL DAILY
Qty: 84 TABLET | Refills: 3 | Status: SHIPPED | OUTPATIENT
Start: 2018-02-21 | End: 2019-04-18

## 2018-02-21 RX ORDER — NORETHINDRONE ACETATE AND ETHINYL ESTRADIOL .02; 1 MG/1; MG/1
1 TABLET ORAL DAILY
Qty: 28 TABLET | Refills: 3 | Status: SHIPPED | OUTPATIENT
Start: 2018-02-21 | End: 2018-02-21

## 2018-02-21 NOTE — MR AVS SNAPSHOT
"              After Visit Summary   2/21/2018    Chelo Garcia    MRN: [id number]           Patient Information     Date Of Birth          1995        Visit Information        Provider Department      2/21/2018 3:00 PM Trish Riley MD [facility]        Today's Diagnoses     Encounter for contraceptive management, unspecified type           Follow-ups after your visit        Who to contact     If you have questions or need follow up information about today's clinic visit or your schedule please contact [facility] directly at [phone].  Normal or non-critical lab and imaging results will be communicated to you by [employer], letter or phone within 4 business days after the clinic has received the results. If you do not hear from us within 7 days, please contact the clinic through [employer] or phone. If you have a critical or abnormal lab result, we will notify you by phone as soon as possible.  Submit refill requests through [employer] or call your pharmacy and they will forward the refill request to us. Please allow 3 business days for your refill to be completed.          Additional Information About Your Visit        MyChart Information     [employer] gives you secure access to your electronic health record. If you see a primary care provider, you can also send messages to your care team and make appointments. If you have questions, please call your primary care clinic.  If you do not have a primary care provider, please call [phone] and they will assist you.        Care EveryWhere ID     This is your Care EveryWhere ID. This could be used by other organizations to access your [facility] medical records  [id number]        Your Vitals Were     Temperature Height Last Period Breastfeeding? BMI (Body Mass Index)       99  F (37.2  C) 5' 4\" (1.626 m) 01/31/2018 (Approximate) No 31.24 kg/m2        Blood Pressure from Last 3 Encounters:   02/21/18 120/70   01/29/18 121/85   07/18/17 " 112/76    Weight from Last 3 Encounters:   02/21/18 182 lb (82.6 kg)   01/29/18 179 lb 12 oz (81.5 kg)   07/18/17 172 lb 9.6 oz (78.3 kg)              Today, you had the following     No orders found for display         Today's Medication Changes          These changes are accurate as of 2/21/18  3:24 PM.  If you have any questions, ask your nurse or doctor.               Start taking these medicines.        Dose/Directions    norethindrone-ethinyl estradiol 1-20 MG-MCG per tablet   Commonly known as:  MICROGESTIN   Used for:  Encounter for contraceptive management, unspecified type   Started by:  Trish Riley MD        Dose:  1 tablet   Take 1 tablet by mouth daily   Quantity:  84 tablet   Refills:  3            Where to get your medicines      These medications were sent to [facility] [address] [place] [address] AT [address] [address]     Phone:  [phone]     norethindrone-ethinyl estradiol 1-20 MG-MCG per tablet                Primary Care Provider Office Phone # Fax #    Andria Pearce [phone] [address] [place] [address]        Equal Access to Services     CYNDIE PETERSEN AH: Mary manley hadasho Soomaali, waaxda luqadaha, qaybta kaalmada adeegyada, joey spence. So [facility] [phone].    ATENCIÓN: Si habla español, tiene a easley disposición servicios gratuitos de asistencia lingüística. Llame al [phone].    We comply with applicable federal civil rights laws and [place] laws. We do not discriminate on the basis of race, color, national origin, age, disability, sex, sexual orientation, or gender identity.            Thank you!     Thank you for choosing [facility]  for your care. Our goal is always to provide you with excellent care. Hearing back from our patients is one way we can continue to improve our services. Please take a few minutes to complete  the written survey that you may receive in the mail after your visit with us. Thank you!             Your Updated Medication List - Protect others around you: Learn how to safely use, store and throw away your medicines at www.disposemymeds.org.          This list is accurate as of 2/21/18  3:24 PM.  Always use your most recent med list.                   Brand Name Dispense Instructions for use Diagnosis    metroNIDAZOLE 500 MG tablet    FLAGYL    14 tablet    Take 1 tablet (500 mg) by mouth 2 times daily    BV (bacterial vaginosis)       norethindrone-ethinyl estradiol 1-20 MG-MCG per tablet    MICROGESTIN    84 tablet    Take 1 tablet by mouth daily    Encounter for contraceptive management, unspecified type

## 2018-02-21 NOTE — NURSING NOTE
"Chief Complaint   Patient presents with     Vaginal Problem       Initial /70  Temp 99  F (37.2  C)  Ht 5' 4\" (1.626 m)  Wt 182 lb (82.6 kg)  LMP 2018 (Approximate)  Breastfeeding? No  BMI 31.24 kg/m2 Estimated body mass index is 31.24 kg/(m^2) as calculated from the following:    Height as of this encounter: 5' 4\" (1.626 m).    Weight as of this encounter: 182 lb (82.6 kg).  BP completed using cuff size: large        The following HM Due: NONE      The following patient reported/Care Every where data was sent to:  P ABSTRACT QUALITY INITIATIVES [[id number]]  none      n/a              "

## 2018-02-21 NOTE — PROGRESS NOTES
"SUBJECTIVE:  Chelo Garcia is a 23 year old  who presents to discuss contraception.  She has been using oral contraceptive pills, has done well with them, but ran out two weeks ago and would like to restart.  No risk factors.  Has had sexual contact since then but used condoms, will do UPT today.      OBJECTIVE: /70  Temp 99  F (37.2  C)  Ht 5' 4\" (1.626 m)  Wt 182 lb (82.6 kg)  LMP 2018 (Approximate)  Breastfeeding? No  BMI 31.24 kg/m2 Patient was not otherwise examined.      ASSESSMENT: Candidate for oral contraceptive pills.      PLAN: Discussed risks/benefits.  She will begin now if UPT is negative.  Discussed possible irregular bleeding with initial cycles, she will call prn.     Please note greater than 50% of this 20 minute appointment were spent in counseling with the patient of the issues described above in the history of present illness and in the plan, including evaluation and managment of contraception.    "

## 2018-05-04 ENCOUNTER — OFFICE VISIT (OUTPATIENT)
Dept: FAMILY MEDICINE | Facility: CLINIC | Age: 23
End: 2018-05-04
Payer: COMMERCIAL

## 2018-05-04 VITALS
BODY MASS INDEX: 30.81 KG/M2 | OXYGEN SATURATION: 98 % | WEIGHT: 179.5 LBS | SYSTOLIC BLOOD PRESSURE: 121 MMHG | DIASTOLIC BLOOD PRESSURE: 84 MMHG | HEART RATE: 76 BPM | RESPIRATION RATE: 19 BRPM | TEMPERATURE: 98.7 F

## 2018-05-04 DIAGNOSIS — K64.4 EXTERNAL HEMORRHOIDS: Primary | ICD-10-CM

## 2018-05-04 PROCEDURE — 99213 OFFICE O/P EST LOW 20 MIN: CPT | Performed by: PHYSICIAN ASSISTANT

## 2018-05-04 NOTE — PATIENT INSTRUCTIONS
Encouraged high fiber diet with increase fluids and water.  May use over the counter stool softners- colace and/or senokot.  Anusol HC creams or wipes as needed.  Importance of sitz baths with epsum salts discussed at length.  Return to clinic as needed or with any worsening or changes in symptoms.       Treating Hemorrhoids: Self-Care    Follow your healthcare provider s advice about caring for your hemorrhoids at home. Some treatments help relieve symptoms right away. Others involve making changes in your diet and exercise habits. These can help ease constipation and prevent hemorrhoid symptoms from coming back.  Relieving symptoms  Your healthcare provider may prescribe anti-inflammatory medicine to help ease your symptoms. The following tips will also help relieve pain and swelling.    Take sitz baths. Taking a sitz bath means sitting in a few inches of warm bath water. Soaking for 10 minutes twice a day can provide welcome relief from painful hemorrhoids. It can also help the area stay clean.    Develop good bowel habits. Use the bathroom when you need to. Don t ignore the urge to move your bowels. This can lead to constipation, hard stools, and straining. Also, don t read while on the toilet. Sit only as long as needed. Wipe gently with soft, unscented toilet tissue or baby wipes.    Use ice packs. Placing an ice pack on a thrombosed external hemorrhoid can help relieve pain right away. It will also help reduce the blood clot. Use the ice for 15 to 20 minutes at a time. Keep a cloth between the ice and your skin to prevent skin damage.    Use other measures. Laxatives and enemas can help ease constipation. But use them only on your healthcare provider s advice. For symptom relief, try using cotton pads soaked in witch hazel. These are available at most drugstores. Over-the-counter hemorrhoid ointments and petroleum jelly can also provide relief.  Add fiber to your diet  Adding fiber to your diet can help  relieve constipation by making stools softer and easier to pass. To increase your fiber intake, your healthcare provider may recommend a bulking agent, such as psyllium. This is a high-fiber supplement available at most grocery stores and drugstores. Eating more fiber-rich foods will also help. There are two types of fiber:    Insoluble fiber is the main ingredient in bulking agents. It s also found in foods such as wheat bran, whole-grain breads, fresh fruits, and vegetables.    Soluble fiber is found in foods such as oat bran. Although soluble fiber is good for you, it may not ease constipation as much as foods high in insoluble fiber.  Drink more water  Along with a high-fiber diet, drinking more water can help ease constipation. This is because insoluble fiber absorbs water, making stools soft and bulky. Be sure to drink plenty of water throughout the day. Drinking fruit juices, such as prune juice or apple juice, can also help prevent constipation.  Get more exercise  Regular exercise aids digestion and helps prevent constipation. It s also great for your health. So talk with your healthcare provider about starting an exercise program. Low-impact activities, such as swimming or walking, are good places to start. Take it easy at first. And remember to drink plenty of water when you exercise.  High-fiber foods  High-fiber foods offer many benefits. By making your stools softer, they help heal and prevent swollen hemorrhoids. They may also help reduce the risk of colon and rectal cancer. Best of all, they re usually low in calories and taste great. Here are some examples of fiber-rich foods.    Whole grains, such as wheat bran, corn bran, and brown rice.    Vegetables, especially carrots, broccoli, cabbage, and peas.    Fruits, such as apples, bananas, raisins, peaches, and pears.    Nuts and legumes, especially peanuts, lentils, and kidney beans.  Easy ways to add fiber  The tips below offer some simple ways to  add more high-fiber foods to your meals.    Start your day with a high-fiber breakfast. Eat a wheat bran cereal along with a sliced banana. Or, try peanut butter on whole-wheat toast.    Eat carrot sticks for snacks. They re easy to prepare, taste great, and are low in calories.    Use whole-grain breads instead of white bread for sandwiches.    Eat fruits for treats. Try an apple and some raisins instead of a candy bar.   Date Last Reviewed: 7/1/2016 2000-2017 [employer]. [address] [place] [address]. All rights reserved. This information is not intended as a substitute for professional medical care. Always follow your healthcare professional's instructions.

## 2018-05-04 NOTE — PROGRESS NOTES
SUBJECTIVE:   Chelo Garcia is a 23 year old female who presents to clinic today for the following health issues:    Gastrointestinal symptoms      Duration: 4 days ago, happened 2 times    Description: Rectal spotting    Intensity:  none    Accompanying signs and symptoms:  none    History  Previous/similar problem: no   Previous evaluation:  none    Aggravating factors: none    Alleviating factors: nothing    Other Therapies tried: None    Patient noticed blood this am when she wiped, similar symptoms 4-5 days before.    None in toilet bowel; no pain. Bright red per rectum    Maybe a little more constipated more recently.    Patient does eat spicey foods.    History of similar symptoms a few years ago - told had a hemorrhoid.          Problem list and histories reviewed & adjusted, as indicated.  Additional history: as documented    Patient Active Problem List   Diagnosis     Proteinuria     Chronic midline low back pain without sciatica     Recurrent major depressive disorder, remission status unspecified (H)     Anxiety     BMI 30.0-30.9,adult     History reviewed. No pertinent surgical history.    Social History   Substance Use Topics     Smoking status: Never Smoker     Smokeless tobacco: Never Used     Alcohol use Yes      Comment: socially.     Family History   Problem Relation Age of Onset     Ovarian Cancer Mother      first had cervical caner , 2016 got ovarian caner at age 38      Cervical Cancer Mother      Breast Cancer Other          Current Outpatient Prescriptions   Medication Sig Dispense Refill     norethindrone-ethinyl estradiol (MICROGESTIN) 1-20 MG-MCG per tablet Take 1 tablet by mouth daily 84 tablet 3     No Known Allergies    Reviewed and updated as needed this visit by clinical staff  Tobacco  Allergies  Meds  Problems  Med Hx  Surg Hx  Fam Hx  Soc Hx        Reviewed and updated as needed this visit by Provider  Allergies  Meds  Problems         ROS:  Constitutional, HEENT,  cardiovascular, pulmonary, GI, , musculoskeletal, neuro, skin, endocrine and psych systems are negative, except as otherwise noted.    OBJECTIVE:     /84 (BP Location: Left arm, Patient Position: Sitting, Cuff Size: Adult Large)  Pulse 76  Temp 98.7  F (37.1  C) (Oral)  Resp 19  Wt 179 lb 8 oz (81.4 kg)  SpO2 98%  BMI 30.81 kg/m2  Body mass index is 30.81 kg/(m^2).  GENERAL: healthy, alert and no distress  RESP: lungs clear to auscultation - no rales, rhonchi or wheezes  CV: regular rate and rhythm, normal S1 S2, no S3 or S4, no murmur, click or rub, no peripheral edema and peripheral pulses strong  ABDOMEN: soft, nontender, no hepatosplenomegaly, no masses and bowel sounds normal  RECTAL (female): normal sphincter tone, non-inflamed external hemorrhoid noted at 6 o'clock location  PSYCH: mentation appears normal, affect normal/bright    Diagnostic Test Results:  none     ASSESSMENT/PLAN:       ICD-10-CM    1. External hemorrhoids K64.4        Patient Instructions     Encouraged high fiber diet with increase fluids and water.  May use over the counter stool softners- colace and/or senokot.  Anusol HC creams or wipes as needed.  Importance of sitz baths with epsum salts discussed at length.  Return to clinic as needed or with any worsening or changes in symptoms.       Treating Hemorrhoids: Self-Care    Follow your healthcare provider s advice about caring for your hemorrhoids at home. Some treatments help relieve symptoms right away. Others involve making changes in your diet and exercise habits. These can help ease constipation and prevent hemorrhoid symptoms from coming back.  Relieving symptoms  Your healthcare provider may prescribe anti-inflammatory medicine to help ease your symptoms. The following tips will also help relieve pain and swelling.    Take sitz baths. Taking a sitz bath means sitting in a few inches of warm bath water. Soaking for 10 minutes twice a day can provide welcome relief  from painful hemorrhoids. It can also help the area stay clean.    Develop good bowel habits. Use the bathroom when you need to. Don t ignore the urge to move your bowels. This can lead to constipation, hard stools, and straining. Also, don t read while on the toilet. Sit only as long as needed. Wipe gently with soft, unscented toilet tissue or baby wipes.    Use ice packs. Placing an ice pack on a thrombosed external hemorrhoid can help relieve pain right away. It will also help reduce the blood clot. Use the ice for 15 to 20 minutes at a time. Keep a cloth between the ice and your skin to prevent skin damage.    Use other measures. Laxatives and enemas can help ease constipation. But use them only on your healthcare provider s advice. For symptom relief, try using cotton pads soaked in witch hazel. These are available at most drugstores. Over-the-counter hemorrhoid ointments and petroleum jelly can also provide relief.  Add fiber to your diet  Adding fiber to your diet can help relieve constipation by making stools softer and easier to pass. To increase your fiber intake, your healthcare provider may recommend a bulking agent, such as psyllium. This is a high-fiber supplement available at most grocery stores and drugstores. Eating more fiber-rich foods will also help. There are two types of fiber:    Insoluble fiber is the main ingredient in bulking agents. It s also found in foods such as wheat bran, whole-grain breads, fresh fruits, and vegetables.    Soluble fiber is found in foods such as oat bran. Although soluble fiber is good for you, it may not ease constipation as much as foods high in insoluble fiber.  Drink more water  Along with a high-fiber diet, drinking more water can help ease constipation. This is because insoluble fiber absorbs water, making stools soft and bulky. Be sure to drink plenty of water throughout the day. Drinking fruit juices, such as prune juice or apple juice, can also help prevent  constipation.  Get more exercise  Regular exercise aids digestion and helps prevent constipation. It s also great for your health. So talk with your healthcare provider about starting an exercise program. Low-impact activities, such as swimming or walking, are good places to start. Take it easy at first. And remember to drink plenty of water when you exercise.  High-fiber foods  High-fiber foods offer many benefits. By making your stools softer, they help heal and prevent swollen hemorrhoids. They may also help reduce the risk of colon and rectal cancer. Best of all, they re usually low in calories and taste great. Here are some examples of fiber-rich foods.    Whole grains, such as wheat bran, corn bran, and brown rice.    Vegetables, especially carrots, broccoli, cabbage, and peas.    Fruits, such as apples, bananas, raisins, peaches, and pears.    Nuts and legumes, especially peanuts, lentils, and kidney beans.  Easy ways to add fiber  The tips below offer some simple ways to add more high-fiber foods to your meals.    Start your day with a high-fiber breakfast. Eat a wheat bran cereal along with a sliced banana. Or, try peanut butter on whole-wheat toast.    Eat carrot sticks for snacks. They re easy to prepare, taste great, and are low in calories.    Use whole-grain breads instead of white bread for sandwiches.    Eat fruits for treats. Try an apple and some raisins instead of a candy bar.   Date Last Reviewed: 7/1/2016 2000-2017 [employer]. [address] [place] [address], [place] [address]. All rights reserved. This information is not intended as a substitute for professional medical care. Always follow your healthcare professional's instructions.      Sunshine Gallardo PA-C  [facility]

## 2018-05-04 NOTE — MR AVS SNAPSHOT
After Visit Summary   5/4/2018    Chelo Garcia    MRN: [id number]           Patient Information     Date Of Birth          1995        Visit Information        Provider Department      5/4/2018 3:40 PM Sunshine Gallardo PA-C [facility] [place]        Today's Diagnoses     External hemorrhoids    -  1      Care Instructions    Encouraged high fiber diet with increase fluids and water.  May use over the counter stool softners- colace and/or senokot.  Anusol HC creams or wipes as needed.  Importance of sitz baths with epsum salts discussed at length.  Return to clinic as needed or with any worsening or changes in symptoms.       Treating Hemorrhoids: Self-Care    Follow your healthcare provider s advice about caring for your hemorrhoids at home. Some treatments help relieve symptoms right away. Others involve making changes in your diet and exercise habits. These can help ease constipation and prevent hemorrhoid symptoms from coming back.  Relieving symptoms  Your healthcare provider may prescribe anti-inflammatory medicine to help ease your symptoms. The following tips will also help relieve pain and swelling.    Take sitz baths. Taking a sitz bath means sitting in a few inches of warm bath water. Soaking for 10 minutes twice a day can provide welcome relief from painful hemorrhoids. It can also help the area stay clean.    Develop good bowel habits. Use the bathroom when you need to. Don t ignore the urge to move your bowels. This can lead to constipation, hard stools, and straining. Also, don t read while on the toilet. Sit only as long as needed. Wipe gently with soft, unscented toilet tissue or baby wipes.    Use ice packs. Placing an ice pack on a thrombosed external hemorrhoid can help relieve pain right away. It will also help reduce the blood clot. Use the ice for 15 to 20 minutes at a time. Keep a cloth between the ice and your skin to prevent skin damage.    Use other  measures. Laxatives and enemas can help ease constipation. But use them only on your healthcare provider s advice. For symptom relief, try using cotton pads soaked in witch hazel. These are available at most drugstores. Over-the-counter hemorrhoid ointments and petroleum jelly can also provide relief.  Add fiber to your diet  Adding fiber to your diet can help relieve constipation by making stools softer and easier to pass. To increase your fiber intake, your healthcare provider may recommend a bulking agent, such as psyllium. This is a high-fiber supplement available at most grocery stores and drugstores. Eating more fiber-rich foods will also help. There are two types of fiber:    Insoluble fiber is the main ingredient in bulking agents. It s also found in foods such as wheat bran, whole-grain breads, fresh fruits, and vegetables.    Soluble fiber is found in foods such as oat bran. Although soluble fiber is good for you, it may not ease constipation as much as foods high in insoluble fiber.  Drink more water  Along with a high-fiber diet, drinking more water can help ease constipation. This is because insoluble fiber absorbs water, making stools soft and bulky. Be sure to drink plenty of water throughout the day. Drinking fruit juices, such as prune juice or apple juice, can also help prevent constipation.  Get more exercise  Regular exercise aids digestion and helps prevent constipation. It s also great for your health. So talk with your healthcare provider about starting an exercise program. Low-impact activities, such as swimming or walking, are good places to start. Take it easy at first. And remember to drink plenty of water when you exercise.  High-fiber foods  High-fiber foods offer many benefits. By making your stools softer, they help heal and prevent swollen hemorrhoids. They may also help reduce the risk of colon and rectal cancer. Best of all, they re usually low in calories and taste great. Here are  some examples of fiber-rich foods.    Whole grains, such as wheat bran, corn bran, and brown rice.    Vegetables, especially carrots, broccoli, cabbage, and peas.    Fruits, such as apples, bananas, raisins, peaches, and pears.    Nuts and legumes, especially peanuts, lentils, and kidney beans.  Easy ways to add fiber  The tips below offer some simple ways to add more high-fiber foods to your meals.    Start your day with a high-fiber breakfast. Eat a wheat bran cereal along with a sliced banana. Or, try peanut butter on whole-wheat toast.    Eat carrot sticks for snacks. They re easy to prepare, taste great, and are low in calories.    Use whole-grain breads instead of white bread for sandwiches.    Eat fruits for treats. Try an apple and some raisins instead of a candy bar.   Date Last Reviewed: 7/1/2016 2000-2017 [employer]. [address] [place] [address]. All rights reserved. This information is not intended as a substitute for professional medical care. Always follow your healthcare professional's instructions.          Follow-ups after your visit        Your next 10 appointments already scheduled     May 17, 2018  1:00 PM CDT   (Arrive by 12:30 PM)   New Visit with Uzma Hardin [facility] ([address] [address] [phone]              Who to contact     If you have questions or need follow up information about today's clinic visit or your schedule please contact [facility] directly at [phone].  Normal or non-critical lab and imaging results will be communicated to you by MyChart, letter or phone within 4 business days after the clinic has received the results. If you do not hear from us within 7 days, please contact the clinic through MyChart or phone. If you have a critical or abnormal lab result, we will notify you by phone as soon as possible.  Submit  refill requests through [email or web address] or call your pharmacy and they will forward the refill request to us. Please allow 3 business days for your refill to be completed.          Additional Information About Your Visit        [employer] Information     [email or web address] gives you secure access to your electronic health record. If you see a primary care provider, you can also send messages to your care team and make appointments. If you have questions, please call your primary care clinic.  If you do not have a primary care provider, please call [phone] and they will assist you.        Care EveryWhere ID     This is your Care EveryWhere ID. This could be used by other organizations to access your [place] medical records  [id number]        Your Vitals Were     Pulse Temperature Respirations Pulse Oximetry BMI (Body Mass Index)       76 98.7  F (37.1  C) (Oral) 19 98% 30.81 kg/m2        Blood Pressure from Last 3 Encounters:   05/04/18 121/84   02/21/18 120/70   01/29/18 121/85    Weight from Last 3 Encounters:   05/04/18 179 lb 8 oz (81.4 kg)   02/21/18 182 lb (82.6 kg)   01/29/18 179 lb 12 oz (81.5 kg)              Today, you had the following     No orders found for display       Primary Care Provider Office Phone # Fax #    Andria Pearce [phone]  [facility] [id number]        Equal Access to Services     CYNDIE PETERSEN : Hadii dionne todd Sorusty, waaxda lucecilioadaha, qaybta kaalmada fransisco, joey cuellar . So [facility] [phone].    ATENCIÓN: Si habla español, tiene a easley disposición servicios gratuitos de asistencia lingüística. Adam al [phone].    We comply with applicable federal civil rights laws and [place] laws. We do not discriminate on the basis of race, color, national origin, age, disability, sex, sexual orientation, or gender identity.            Thank you!     Thank you for choosing [facility]  for your care. Our goal is always  to provide you with excellent care. Hearing back from our patients is one way we can continue to improve our services. Please take a few minutes to complete the written survey that you may receive in the mail after your visit with us. Thank you!             Your Updated Medication List - Protect others around you: Learn how to safely use, store and throw away your medicines at www.disposemymeds.org.          This list is accurate as of 5/4/18  3:45 PM.  Always use your most recent med list.                   Brand Name Dispense Instructions for use Diagnosis    metroNIDAZOLE 500 MG tablet    FLAGYL    14 tablet    Take 1 tablet (500 mg) by mouth 2 times daily    BV (bacterial vaginosis)       norethindrone-ethinyl estradiol 1-20 MG-MCG per tablet    MICROGESTIN    84 tablet    Take 1 tablet by mouth daily    Encounter for contraceptive management, unspecified type

## 2018-05-17 ENCOUNTER — OFFICE VISIT (OUTPATIENT)
Dept: PSYCHOLOGY | Facility: CLINIC | Age: 23
End: 2018-05-17
Attending: FAMILY MEDICINE
Payer: COMMERCIAL

## 2018-05-17 DIAGNOSIS — F33.2 MAJOR DEPRESSIVE DISORDER, RECURRENT EPISODE, SEVERE (H): Primary | ICD-10-CM

## 2018-05-17 DIAGNOSIS — F41.1 GENERALIZED ANXIETY DISORDER: ICD-10-CM

## 2018-05-17 PROCEDURE — 90834 PSYTX W PT 45 MINUTES: CPT | Performed by: COUNSELOR

## 2018-05-17 NOTE — Clinical Note
Chelo Parker Dr. completed first intake appt for therapy. She currently meets criteria for major depressive disorder, recurrent, severe, and generalized anxiety disorder. She is interested in medications, I encouraged her to schedule an appt with you. Please contact me with any questions or concerns.   Thank you, Uzma Hardin MA, [facility]

## 2018-05-17 NOTE — MR AVS SNAPSHOT
MRN:[id number]                      After Visit Summary   5/17/2018    Chelo Garcia    MRN: [id number]           Visit Information        Provider Department      5/17/2018 1:00 PM Wilfred Uzma [facility]      Your next 10 appointments already scheduled     Jun 01, 2018 12:30 PM CDT   Return Visit with Uzma Hardin [facility]    [facility]  [address]  [facility] [id number] [phone]            Jun 13, 2018  5:30 PM CDT   Return Visit with Uzma Hardin [facility]    [facility]  [address]  [facility] [id number] [phone]              MyChart Information     [employer] gives you secure access to your electronic health record. If you see a primary care provider, you can also send messages to your care team and make appointments. If you have questions, please call your primary care clinic.  If you do not have a primary care provider, please call [phone] and they will assist you.        Care EveryWhere ID     This is your Care EveryWhere ID. This could be used by other organizations to access your [facility] medical records  [id number]        Equal Access to Services     CYNDIE PETERSEN : [place], waaxda luqadaha, qaybta kaalmada adeegyada, joey spence. So [facility] [phone].    ATENCIÓN: Si habla español, tiene a easley disposición servicios gratuitos de asistencia lingüística. Llguerline al [phone].    We comply with applicable federal civil rights laws and [place] laws. We do not discriminate on the basis of race, color, national origin, age, disability, sex, sexual orientation, or gender identity.
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
## 2018-05-30 PROBLEM — F33.9 RECURRENT MAJOR DEPRESSIVE DISORDER, REMISSION STATUS UNSPECIFIED (H): Status: RESOLVED | Noted: 2018-01-29 | Resolved: 2018-05-30

## 2018-05-30 PROBLEM — F41.9 ANXIETY: Status: RESOLVED | Noted: 2018-01-29 | Resolved: 2018-05-30

## 2018-06-01 ENCOUNTER — OFFICE VISIT (OUTPATIENT)
Dept: FAMILY MEDICINE | Facility: CLINIC | Age: 23
End: 2018-06-01
Payer: COMMERCIAL

## 2018-06-01 ENCOUNTER — OFFICE VISIT (OUTPATIENT)
Dept: PSYCHOLOGY | Facility: CLINIC | Age: 23
End: 2018-06-01
Payer: COMMERCIAL

## 2018-06-01 VITALS
TEMPERATURE: 98 F | WEIGHT: 177 LBS | HEART RATE: 77 BPM | SYSTOLIC BLOOD PRESSURE: 123 MMHG | BODY MASS INDEX: 30.38 KG/M2 | DIASTOLIC BLOOD PRESSURE: 81 MMHG | OXYGEN SATURATION: 98 % | RESPIRATION RATE: 16 BRPM

## 2018-06-01 DIAGNOSIS — F33.2 SEVERE EPISODE OF RECURRENT MAJOR DEPRESSIVE DISORDER, WITHOUT PSYCHOTIC FEATURES (H): Primary | ICD-10-CM

## 2018-06-01 DIAGNOSIS — F41.1 GENERALIZED ANXIETY DISORDER: ICD-10-CM

## 2018-06-01 DIAGNOSIS — F33.2 MAJOR DEPRESSIVE DISORDER, RECURRENT EPISODE, SEVERE (H): Primary | ICD-10-CM

## 2018-06-01 PROCEDURE — 99214 OFFICE O/P EST MOD 30 MIN: CPT | Performed by: FAMILY MEDICINE

## 2018-06-01 PROCEDURE — 90791 PSYCH DIAGNOSTIC EVALUATION: CPT | Performed by: COUNSELOR

## 2018-06-01 RX ORDER — HYDROXYZINE HYDROCHLORIDE 25 MG/1
25 TABLET, FILM COATED ORAL EVERY 8 HOURS PRN
Qty: 10 TABLET | Refills: 0 | Status: SHIPPED | OUTPATIENT
Start: 2018-06-01 | End: 2018-09-20

## 2018-06-01 ASSESSMENT — ANXIETY QUESTIONNAIRES
7. FEELING AFRAID AS IF SOMETHING AWFUL MIGHT HAPPEN: SEVERAL DAYS
2. NOT BEING ABLE TO STOP OR CONTROL WORRYING: MORE THAN HALF THE DAYS
1. FEELING NERVOUS, ANXIOUS, OR ON EDGE: SEVERAL DAYS
GAD7 TOTAL SCORE: 10
5. BEING SO RESTLESS THAT IT IS HARD TO SIT STILL: SEVERAL DAYS
3. WORRYING TOO MUCH ABOUT DIFFERENT THINGS: MORE THAN HALF THE DAYS
IF YOU CHECKED OFF ANY PROBLEMS ON THIS QUESTIONNAIRE, HOW DIFFICULT HAVE THESE PROBLEMS MADE IT FOR YOU TO DO YOUR WORK, TAKE CARE OF THINGS AT HOME, OR GET ALONG WITH OTHER PEOPLE: SOMEWHAT DIFFICULT
6. BECOMING EASILY ANNOYED OR IRRITABLE: SEVERAL DAYS

## 2018-06-01 ASSESSMENT — PATIENT HEALTH QUESTIONNAIRE - PHQ9: 5. POOR APPETITE OR OVEREATING: MORE THAN HALF THE DAYS

## 2018-06-01 NOTE — Clinical Note
Chelo Parker Dr. completed intake appt for therapy. We will be working on depression and anxiety. Please contact me with any questions or concerns.   Thank you, Uzma Hardin MA, [facility]
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
## 2018-06-01 NOTE — LETTER
My Depression Action Plan  Name: Chelo Garcia   Date of Birth 1995  Date: 6/1/2018    My doctor: Andria Pearce   My clinic: [address] [address] [phone]          GREEN    ZONE   Good Control    What it looks like:     Things are going generally well. You have normal up s and down s. You may even feel depressed from time to time, but bad moods usually last less than a day.   What you need to do:  1. Continue to care for yourself (see self care plan)  2. Check your depression survival kit and update it as needed  3. Follow your physician s recommendations including any medication.  4. Do not stop taking medication unless you consult with your physician first.           YELLOW         ZONE Getting Worse    What it looks like:     Depression is starting to interfere with your life.     It may be hard to get out of bed; you may be starting to isolate yourself from others.    Symptoms of depression are starting to last most all day and this has happened for several days.     You may have suicidal thoughts but they are not constant.   What you need to do:     1. Call your care team, your response to treatment will improve if you keep your care team informed of your progress. Yellow periods are signs an adjustment may need to be made.     2. Continue your self-care, even if you have to fake it!    3. Talk to someone in your support network    4. Open up your depression survival kit           RED    ZONE Medical Alert - Get Help    What it looks like:     Depression is seriously interfering with your life.     You may experience these or other symptoms: You can t get out of bed most days, can t work or engage in other necessary activities, you have trouble taking care of basic hygiene, or basic responsibilities, thoughts of suicide or death that will not go away, self-injurious behavior.     What you need to do:  1. Call your care team and  request a same-day appointment. If they are not available (weekends or after hours) call your local crisis line, emergency room or 911.            Depression Self Care Plan / Survival Kit    Self-Care for Depression  Here s the deal. Your body and mind are really not as separate as most people think.  What you do and think affects how you feel and how you feel influences what you do and think. This means if you do things that people who feel good do, it will help you feel better.  Sometimes this is all it takes.  There is also a place for medication and therapy depending on how severe your depression is, so be sure to consult with your medical provider and/ or Behavioral Health Consultant if your symptoms are worsening or not improving.     In order to better manage my stress, I will:    Exercise  Get some form of exercise, every day. This will help reduce pain and release endorphins, the  feel good  chemicals in your brain. This is almost as good as taking antidepressants!  This is not the same as joining a gym and then never going! (they count on that by the way ) It can be as simple as just going for a walk or doing some gardening, anything that will get you moving.      Hygiene   Maintain good hygiene (Get out of bed in the morning, Make your bed, Brush your teeth, Take a shower, and Get dressed like you were going to work, even if you are unemployed).  If your clothes don't fit try to get ones that do.    Diet  I will strive to eat foods that are good for me, drink plenty of water, and avoid excessive sugar, caffeine, alcohol, and other mood-altering substances.  Some foods that are helpful in depression are: complex carbohydrates, B vitamins, flaxseed, fish or fish oil, fresh fruits and vegetables.    Psychotherapy  I agree to participate in Individual Therapy (if recommended).    Medication  If prescribed medications, I agree to take them.  Missing doses can result in serious side effects.  I understand that  drinking alcohol, or other illicit drug use, may cause potential side effects.  I will not stop my medication abruptly without first discussing it with my provider.    Staying Connected With Others  I will stay in touch with my friends, family members, and my primary care provider/team.    Use your imagination  Be creative.  We all have a creative side; it doesn t matter if it s oil painting, sand castles, or mud pies! This will also kick up the endorphins.    Witness Beauty  (AKA stop and smell the roses) Take a look outside, even in mid-winter. Notice colors, textures. Watch the squirrels and birds.     Service to others  Be of service to others.  There is always someone else in need.  By helping others we can  get out of ourselves  and remember the really important things.  This also provides opportunities for practicing all the other parts of the program.    Humor  Laugh and be silly!  Adjust your TV habits for less news and crime-drama and more comedy.    Control your stress  Try breathing deep, massage therapy, biofeedback, and meditation. Find time to relax each day.     My support system    Clinic Contact:  Phone number:    Contact 1:  Phone number:    Contact 2:  Phone number:    Taoist/:  Phone number:    Therapist:  Phone number:    Local crisis center:    Phone number:    Other community support:  Phone number:

## 2018-06-01 NOTE — PROGRESS NOTES
SUBJECTIVE:   Chelo Garcia is a 23 year old female who presents to clinic today for the following health issues:    Abnormal Mood Symptoms      Duration: 10 yrs    Description:  Depression: YES  Anxiety: YES  Panic attacks: no      Accompanying signs and symptoms: see PHQ-9 and CHASE scores    History (similar episodes/previous evaluation): None    Precipitating or alleviating factors: life stress    Therapies tried and outcome: none; therapy    Moving out of grandmas to friends soon. Has a steady boyfriend    Answers for HPI/ROS submitted by the patient on 6/1/2018   Chronic problems general questions HPI Form  Depression/Anxiety: Depression & Anxiety  Status since last visit:: Worsened  Other associated symptoms of depression and anxiety:: None  Significant life event:: No  Current substance use:: Alcohol      PHQ-9 1/29/2018 1/29/2018 6/1/2018   Total Score 13 14 20   Q9: Suicide Ideation Several days More than half the days More than half the days   F/U: Thoughts of suicide or self-harm - Yes -   F/U: Self harm-plan - No -   F/U: Self-harm action - No -   F/U: Safety concerns - No -     CHASE-7 SCORE 1/29/2018 6/1/2018   Total Score 4 10     In the past two weeks have you had thoughts of suicide or self-harm?  Yes  In the past two weeks have you thought of a plan or intent to harm yourself? No.  Do you have concerns about your personal safety or the safety of others?   No   Has safety plan, forward thinking, future oriented, moving to apt with friend, grandparents currently living with good support, has a supportive boyfriend, doing well at school. Chronic SI passive thoughts, reported prior attempts, never hospitalized or admitted,prior past attempt with pills and self cutting, future oriented in a new job, family protective factor,  seen by her therapist today, encouraged to be seen for meds. Has a crisis plan in place and contracts to safety and apt made to be see me again in 2 weeks for a recheck. & in  meantime seeing therapist in between again soon.    PHQ-9  English  PHQ-9   Any Language  CHASE-7  Suicide Assessment Five-step Evaluation and Treatment (SAFE-T)    Amount of exercise or physical activity: planning to start    Problems taking medications regularly: N/A    Medication side effects: none    Diet: regular (no restrictions)      PHQ-9 ([employer]) 6/1/2018   1.  Little interest or pleasure in doing things 2   2.  Feeling down, depressed, or hopeless 3   3.  Trouble falling or staying asleep, or sleeping too much 3   4.  Feeling tired or having little energy 2   5.  Poor appetite or overeating 2   6.  Feeling bad about yourself 2   7.  Trouble concentrating 2   8.  Moving slowly or restless 2   9.  Suicidal or self-harm thoughts 2   PHQ-9 Total Score 20   Difficulty at work, home, or with people Very difficult   In the past two weeks have you had thoughts of suicide or self harm?    Do you have concerns about your personal safety or the safety of others?    In the past 2 weeks have you thought about a plan or had intention to harm yourself?    In the past 2 weeks have you acted on these thoughts in any way?    1.  Little interest or pleasure in doing things    2.  Feeling down, depressed, or hopeless    3.  Trouble falling or staying asleep, or sleeping too much    4.  Feeling tired or having little energy    5.  Poor appetite or overeating    6.  Feeling bad about yourself    7.  Trouble concentrating    8.  Moving slowly or restless    9.  Suicidal or self-harm thoughts    [facility] TOTAL SCORE----->    Difficulty at work, home, or with people    F/U: Thoughts of suicide or self harm?    F/U: Plan or intention for self harm?    F/U: Acted on thoughts?    F/U: Safety concerns for self or others?    CHASE-7   [employer], 2002; Used with Permission) 6/1/2018   1. Feeling nervous, anxious, or on edge 1   2. Not being able to stop or control worrying 2   3. Worrying too much about different things 2   4.  Trouble relaxing 2   5. Being so restless that it is hard to sit still 1   6. Becoming easily annoyed or irritable 1   7. Feeling afraid, as if something awful might happen 1   CHASE-7 Total Score 10   If you checked any problems, how difficult have they made it for you to do your work, take care of things at home, or get along with other people? Somewhat difficult     Hx of BMI 30, chronic mild low back pain intermittently, CHASE, MDD, resolved proteinuria, on microgestin BCP, seen 1/29/18 for a physical. At that time declined meds, to speak to Konrad & referred for CBT.cbc, UA, CMP, TSH was normal. LDL was mildly elevated & wet prep showed BV & given metro gel. Std testing was all negative but not immune to hepatitis B. Advised revaccination but didn't come in. Called and given metronidazole & diflucan in feb & symptoms of BV finally resolved. Sen then by gyn 2/21 & put on BCP. 5/4 seen for external hemorrhoids that are not bothering her too much currently. 5/17 seen finally by psychology. [place]  negative. Reports no alcohol or drug use. pap negative 2016 due in 2019,     Uses OTC sleeping aid  to sleep, takes 2 pills every other day.     nausea once the other day, may have been related to anxiety.    Problem list and histories reviewed & adjusted, as indicated.  Additional history: as documented    Patient Active Problem List   Diagnosis     Chronic midline low back pain without sciatica     BMI 30.0-30.9,adult     Major depressive disorder, recurrent episode, severe (H)     Generalized anxiety disorder     Not immune to hepatitis B virus     History reviewed. No pertinent surgical history.    Social History   Substance Use Topics     Smoking status: Never Smoker     Smokeless tobacco: Never Used     Alcohol use Yes      Comment: socially.     Family History   Problem Relation Age of Onset     Ovarian Cancer Mother      first had cervical caner , 2016 got ovarian caner at age 38      Cervical Cancer Mother      Breast  Cancer Other          No Known Allergies  Recent Labs   Lab Test  01/29/18   1028   A1C  5.1   LDL  127*   HDL  68   TRIG  98   ALT  16   CR  0.75   GFRESTIMATED  >90   GFRESTBLACK  >90   POTASSIUM  4.1   TSH  0.96      BP Readings from Last 3 Encounters:   06/01/18 123/81   05/04/18 121/84   02/21/18 120/70    Wt Readings from Last 3 Encounters:   06/01/18 177 lb (80.3 kg)   05/04/18 179 lb 8 oz (81.4 kg)   02/21/18 182 lb (82.6 kg)                  Labs reviewed in EPIC    Reviewed and updated as needed this visit by clinical staff  Tobacco  Allergies  Meds  Problems  Med Hx  Surg Hx  Fam Hx  Soc Hx        Reviewed and updated as needed this visit by Provider  Tobacco  Allergies  Meds  Problems  Med Hx  Surg Hx  Fam Hx  Soc Hx          ROS:  Constitutional, HEENT, cardiovascular, pulmonary, GI, , musculoskeletal, neuro, skin, endocrine and psych systems are negative, except as otherwise noted.    OBJECTIVE:     /81 (BP Location: Left arm, Patient Position: Chair, Cuff Size: Adult Regular)  Pulse 77  Temp 98  F (36.7  C) (Oral)  Resp 16  Wt 177 lb (80.3 kg)  LMP 05/28/2018 (Exact Date)  SpO2 98%  BMI 30.38 kg/m2  Body mass index is 30.38 kg/(m^2).  GENERAL: healthy, alert and no distress  EYES: Eyes grossly normal to inspection, PERRL and conjunctivae and sclerae normal  RESP: lungs clear to auscultation - no rales, rhonchi or wheezes  CV: regular rate and rhythm, normal S1 S2, no S3 or S4, no murmur, click or rub, no peripheral edema and peripheral pulses strong  ABDOMEN: soft, nontender  MS: no gross musculoskeletal defects noted, no edema  SKIN: no suspicious lesions or rashes  NEURO: Normal strength and tone, mentation intact and speech normal  PSYCH: mentation appears normal, affect normal/bright    Diagnostic Test Results:  No results found for this or any previous visit (from the past 24 hour(s)).    ASSESSMENT/PLAN:     1. Severe episode of recurrent major depressive disorder,  without psychotic features (H)  Start sertraline 25 mg daily 1 week then 50 mg daily after that. Discussed the pathophysiology of anxiety/depression episodes and the various symptoms seen associated with anxiety episodes. Discussed possible triggers including fatigue, depression, stress, and chemicals such as alcohol, caffeine and certain drugs. Discussed the treatment including an aerobic exercise program, adequate rest, and both rescue meds and maintenance meds. We discussed the treatment for anxiety and depression in detail.  The importance of a multi faceted approach in controlling symptoms was reviewed.  The benefits of cognitive behavioral therapy reviewed, benefits of exercise, and stress reduction also discussed.  Duration of treatment is at least 9 months with medication. It may take 3-4 weeks before symptom improvement happens.  Do not stop medication suddenly, medication will need to be tapered off.  Side effects include decreased sex drive, drowsiness, weight gain, insomnia, anxiety, dizziness, headache, and nausea.  Some of these get better after the first 2-4 weeks.  We may have to try several different medications before we find the one that's right for you.  Some patients experience worsening of mood and can even have suicidal thoughts when they start these medications.  If that is the case go to the ER.  Touch base with me in 2 weeks in office visit. Slight increased risk of suicide with SSRI group of medications discussed. Atarax 25 mg every 8 hr prn anxiety attack while sertraline kicks in # 10 given. Continue CBT - cognitive behavioral therapy (eCBT debbi). Contracts to safety. Chronic passive SI, prior attempts, never hospitalized, reports never  Treated. High risk but under care of therapy, pranav plan in place. Will see tem next week too & family , friends & future oriented seem to be protective factors here. Advised Vitamin D 2000 IU daily , also recommend a multivitamin, and fish oil daily.  "Avoid Caffeine and alcohol. Valerian root extract for relaxation and sleep OR Melatonin at bedtime. Recommend \"The Chemistry of Calm\" by Nick Austin . Also \"Chemistry of Mandy\" book and Work book by same author, \"Hope and Help for your Nerves\" by Teresa Bryan. If experiencing a mental health crisis to call the crisis response provider in your community :  [place]: Adult [phone] children [phone]  Iron: Adult [id number] children [phone]  In a life threatening emergency , call 911. Otherwise if need help for urgent mental health please go to the Behavioral emergency Center at [facility]   - DEPRESSION ACTION PLAN (DAP)  - sertraline (ZOLOFT) 50 MG tablet; Take 1/2 tablet (25 mg) for 1-2 weeks, then increase to 1 tablet orally daily  Dispense: 30 tablet; Refill: 0    2. Generalized anxiety disorder  - DEPRESSION ACTION PLAN (DAP)  - sertraline (ZOLOFT) 50 MG tablet; Take 1/2 tablet (25 mg) for 1-2 weeks, then increase to 1 tablet orally daily  Dispense: 30 tablet; Refill: 0  - hydrOXYzine (ATARAX) 25 MG tablet; Take 1 tablet (25 mg) by mouth every 8 hours as needed for anxiety  Dispense: 10 tablet; Refill: 0    3. Not immune to hep B  Offer revaccination series at next visit.     See Patient Instructions    Andria Pearce MD  [facility]    "

## 2018-06-01 NOTE — MR AVS SNAPSHOT
MRN:[id number]                      After Visit Summary   6/1/2018    Chelo Garcia    MRN: [id number]           Visit Information        Provider Department      6/1/2018 12:30 PM [facility]      Your next 10 appointments already scheduled     Misha 15, 2018  3:40 PM CDT   [facility] Visit with Andria Pearce [facility]   [facility]    [address] [place] [id number] [phone]           Bring a current list of meds and any records pertaining to this visit. For Physicals, please bring immunization records and any forms needing to be filled out. Please arrive 10 minutes early to complete paperwork.            Jul 05, 2018  5:30 PM CDT   Return Visit with MIA Payton   [facility] ([facility])    [place] [id number] [address] [id number] [phone]              MyChart Information     [employer] gives you secure access to your electronic health record. If you see a primary care provider, you can also send messages to your care team and make appointments. If you have questions, please call your primary care clinic.  If you do not have a primary care provider, please call [phone] and they will assist you.        Care EveryWhere ID     This is your Care EveryWhere ID. This could be used by other organizations to access your [facility] medical records  [id number]        Equal Access to Services     CYNDIE PETERSEN : Hadii dionne chairezo Sorusty, waaxda luqadaha, qaybta kaalmada adeandreayada, joey spence. So [facility] [phone].    ATENCIÓN: Si habla español, tiene a easley disposición servicios gratuitos de asistencia lingüística. Llame al [phone].    We comply with applicable federal civil rights laws and [place] laws. We do not discriminate on the basis of race, color, national origin, age,  disability, sex, sexual orientation, or gender identity.

## 2018-06-01 NOTE — LETTER
My Depression Action Plan  Name: Chelo Garcia   Date of Birth 1995  Date: 5/30/2018    My doctor: Andria Pearce   My clinic: [address] [address] [phone]          GREEN    ZONE   Good Control    What it looks like:     Things are going generally well. You have normal up s and down s. You may even feel depressed from time to time, but bad moods usually last less than a day.   What you need to do:  1. Continue to care for yourself (see self care plan)  2. Check your depression survival kit and update it as needed  3. Follow your physician s recommendations including any medication.  4. Do not stop taking medication unless you consult with your physician first.           YELLOW         ZONE Getting Worse    What it looks like:     Depression is starting to interfere with your life.     It may be hard to get out of bed; you may be starting to isolate yourself from others.    Symptoms of depression are starting to last most all day and this has happened for several days.     You may have suicidal thoughts but they are not constant.   What you need to do:     1. Call your care team, your response to treatment will improve if you keep your care team informed of your progress. Yellow periods are signs an adjustment may need to be made.     2. Continue your self-care, even if you have to fake it!    3. Talk to someone in your support network    4. Open up your depression survival kit           RED    ZONE Medical Alert - Get Help    What it looks like:     Depression is seriously interfering with your life.     You may experience these or other symptoms: You can t get out of bed most days, can t work or engage in other necessary activities, you have trouble taking care of basic hygiene, or basic responsibilities, thoughts of suicide or death that will not go away, self-injurious behavior.     What you need to do:  1. Call your care team and  request a same-day appointment. If they are not available (weekends or after hours) call your local crisis line, emergency room or 911.            Depression Self Care Plan / Survival Kit    Self-Care for Depression  Here s the deal. Your body and mind are really not as separate as most people think.  What you do and think affects how you feel and how you feel influences what you do and think. This means if you do things that people who feel good do, it will help you feel better.  Sometimes this is all it takes.  There is also a place for medication and therapy depending on how severe your depression is, so be sure to consult with your medical provider and/ or Behavioral Health Consultant if your symptoms are worsening or not improving.     In order to better manage my stress, I will:    Exercise  Get some form of exercise, every day. This will help reduce pain and release endorphins, the  feel good  chemicals in your brain. This is almost as good as taking antidepressants!  This is not the same as joining a gym and then never going! (they count on that by the way ) It can be as simple as just going for a walk or doing some gardening, anything that will get you moving.      Hygiene   Maintain good hygiene (Get out of bed in the morning, Make your bed, Brush your teeth, Take a shower, and Get dressed like you were going to work, even if you are unemployed).  If your clothes don't fit try to get ones that do.    Diet  I will strive to eat foods that are good for me, drink plenty of water, and avoid excessive sugar, caffeine, alcohol, and other mood-altering substances.  Some foods that are helpful in depression are: complex carbohydrates, B vitamins, flaxseed, fish or fish oil, fresh fruits and vegetables.    Psychotherapy  I agree to participate in Individual Therapy (if recommended).    Medication  If prescribed medications, I agree to take them.  Missing doses can result in serious side effects.  I understand that  drinking alcohol, or other illicit drug use, may cause potential side effects.  I will not stop my medication abruptly without first discussing it with my provider.    Staying Connected With Others  I will stay in touch with my friends, family members, and my primary care provider/team.    Use your imagination  Be creative.  We all have a creative side; it doesn t matter if it s oil painting, sand castles, or mud pies! This will also kick up the endorphins.    Witness Beauty  (AKA stop and smell the roses) Take a look outside, even in mid-winter. Notice colors, textures. Watch the squirrels and birds.     Service to others  Be of service to others.  There is always someone else in need.  By helping others we can  get out of ourselves  and remember the really important things.  This also provides opportunities for practicing all the other parts of the program.    Humor  Laugh and be silly!  Adjust your TV habits for less news and crime-drama and more comedy.    Control your stress  Try breathing deep, massage therapy, biofeedback, and meditation. Find time to relax each day.     My support system    Clinic Contact:  Phone number:    Contact 1:  Phone number:    Contact 2:  Phone number:    Latter day/:  Phone number:    Therapist:  Phone number:    Local crisis center:    Phone number:    Other community support:  Phone number:

## 2018-06-01 NOTE — MR AVS SNAPSHOT
After Visit Summary   6/1/2018    Chelo Garcia    MRN: [id number]           Patient Information     Date Of Birth          1995        Visit Information        Provider Department      6/1/2018 2:40 PM Andria Pearce MD [facility]        Today's Diagnoses     Severe episode of recurrent major depressive disorder, without psychotic features (H)    -  1    Generalized anxiety disorder          Care Instructions    Start sertraline 25 mg daily 1 week then 50 mg daily after that   Discussed the pathophysiology of anxiety/depression episodes and the various symptoms seen associated with anxiety episodes. Discussed possible triggers including fatigue, depression, stress, and chemicals such as alcohol, caffeine and certain drugs. Discussed the treatment including an aerobic exercise program, adequate rest, and both rescue meds and maintenance meds. We discussed the treatment for anxiety and depression in detail.  The importance of a multi faceted approach in controlling symptoms was reviewed.  The benefits of cognitive behavioral therapy reviewed, benefits of exercise, and stress reduction also discussed.      Duration of treatment is at least 9 months with medication. It may take 3-4 weeks before symptom improvement happens.  Do not stop medication suddenly, medication will need to be tapered off.  Side effects include decreased sex drive, drowsiness, weight gain, insomnia, anxiety, dizziness, headache, and nausea.  Some of these get better after the first 2-4 weeks.  We may have to try several different medications before we find the one that's right for you.  Some patients experience worsening of mood and can even have suicidal thoughts when they start these medications.  If that is the case go to the ER.  Touch base with me in 2 weeks in office visit. .Slight increased risk of suicide with SSRI group of medications discussed    Consider therapy - CBT - cognitive behavioral therapy  "(eCBT debbi)   Vitamin D 2000 IU daily , also recommend a multivitamin, and fish oil daily  Avoid Caffeine and alcohol   Valerian root extract for relaxation and sleep OR Melatonin at bedtime.    \"The Chemistry of Calm\" by Nick Austin . Also \"Chemistry of Mandy\" book and Work book by same author  \"Hope and Help for your Nerves\" by Teresa Bryan   If you are experiencing a mental health crisis call the crisis response provider in your community :  Alin: Adult [phone] children [phone]  Iron: Adult [phone] children [phone]  In a life threatening emergency , call 911     Otherwise if need help for urgent mental health please go to the Behavioral emergency Center at [facility]                 Follow-ups after your visit        Your next 10 appointments already scheduled     Jul 05, 2018  5:30 PM CDT   Return Visit with Uzma Hardin [facility] ([facility]  [address] [place], [place] [id number]  [facility] [address] [phone]              Who to contact     If you have questions or need follow up information about today's clinic visit or your schedule please contact [facility] directly at [phone].  Normal or non-critical lab and imaging results will be communicated to you by Therapeutic Systemshart, letter or phone within 4 business days after the clinic has received the results. If you do not hear from us within 7 days, please contact the clinic through Therapeutic Systemshart or phone. If you have a critical or abnormal lab result, we will notify you by phone as soon as possible.  Submit refill requests through [employer] or call your pharmacy and they will forward the refill request to us. Please allow 3 business days for your refill to be completed.          Additional Information About Your Visit        eSolar Information     [employer] gives you secure access to your electronic health record. If you see a primary " care provider, you can also send messages to your care team and make appointments. If you have questions, please call your primary care clinic.  If you do not have a primary care provider, please call [phone] and they will assist you.        Care EveryWhere ID     This is your Care EveryWhere ID. This could be used by other organizations to access your [place] medical records  [id number]        Your Vitals Were     Pulse Temperature Respirations Last Period Pulse Oximetry BMI (Body Mass Index)    77 98  F (36.7  C) (Oral) 16 05/28/2018 (Exact Date) 98% 30.38 kg/m2       Blood Pressure from Last 3 Encounters:   06/01/18 123/81   05/04/18 121/84   02/21/18 120/70    Weight from Last 3 Encounters:   06/01/18 177 lb (80.3 kg)   05/04/18 179 lb 8 oz (81.4 kg)   02/21/18 182 lb (82.6 kg)              We Performed the Following     DEPRESSION ACTION PLAN (DAP)          Today's Medication Changes          These changes are accurate as of 6/1/18  2:48 PM.  If you have any questions, ask your nurse or doctor.               Start taking these medicines.        Dose/Directions    hydrOXYzine 25 MG tablet   Commonly known as:  ATARAX   Used for:  Generalized anxiety disorder   Started by:  Andria Pearce MD        Dose:  25 mg   Take 1 tablet (25 mg) by mouth every 8 hours as needed for anxiety   Quantity:  10 tablet   Refills:  0       sertraline 50 MG tablet   Commonly known as:  ZOLOFT   Used for:  Severe episode of recurrent major depressive disorder, without psychotic features (H), Generalized anxiety disorder   Started by:  Andria Pearce MD        Take 1/2 tablet (25 mg) for 1-2 weeks, then increase to 1 tablet orally daily   Quantity:  30 tablet   Refills:  0            Where to get your medicines      These medications [facility] to [facility] [address] [facility] [address] [place] [address] AT [address] [address]    Hours:  24-hours Phone:  [phone]      hydrOXYzine 25 MG tablet    sertraline 50 MG tablet                Primary Care Provider Office Phone # Fax #    Andria Pearce [phone] [address] [address]        Equal Access to Services     CYNDIE PETERSEN : Hadii dionne manley mihaelao Sorusty, waaxda luqadaha, qaybta kaalmada adejackson, joey troncoso laNataliapiero spence. So [facility] [phone].    ATENCIÓN: Si habla español, tiene a easley disposición servicios gratuitos de asistencia lingüística. Llame al [phone].    We comply with applicable federal civil rights laws and [place] laws. We do not discriminate on the basis of race, color, national origin, age, disability, sex, sexual orientation, or gender identity.            Thank you!     Thank you for choosing [facility]  for your care. Our goal is always to provide you with excellent care. Hearing back from our patients is one way we can continue to improve our services. Please take a few minutes to complete the written survey that you may receive in the mail after your visit with us. Thank you!             Your Updated Medication List - Protect others around you: Learn how to safely use, store and throw away your medicines at www.disposemymeds.org.          This list is accurate as of 6/1/18  2:48 PM.  Always use your most recent med list.                   Brand Name Dispense Instructions for use Diagnosis    hydrOXYzine 25 MG tablet    ATARAX    10 tablet    Take 1 tablet (25 mg) by mouth every 8 hours as needed for anxiety    Generalized anxiety disorder       norethindrone-ethinyl estradiol 1-20 MG-MCG per tablet    MICROGESTIN    84 tablet    Take 1 tablet by mouth daily    Encounter for contraceptive management, unspecified type       sertraline 50 MG tablet    ZOLOFT    30 tablet    Take 1/2 tablet (25 mg) for 1-2 weeks, then increase to 1 tablet orally daily    Severe episode of recurrent major depressive disorder, without psychotic features  (H), Generalized anxiety disorder

## 2018-06-01 NOTE — PATIENT INSTRUCTIONS
"Start sertraline 25 mg daily 1 week then 50 mg daily after that   Discussed the pathophysiology of anxiety/depression episodes and the various symptoms seen associated with anxiety episodes. Discussed possible triggers including fatigue, depression, stress, and chemicals such as alcohol, caffeine and certain drugs. Discussed the treatment including an aerobic exercise program, adequate rest, and both rescue meds and maintenance meds. We discussed the treatment for anxiety and depression in detail.  The importance of a multi faceted approach in controlling symptoms was reviewed.  The benefits of cognitive behavioral therapy reviewed, benefits of exercise, and stress reduction also discussed.      Duration of treatment is at least 9 months with medication. It may take 3-4 weeks before symptom improvement happens.  Do not stop medication suddenly, medication will need to be tapered off.  Side effects include decreased sex drive, drowsiness, weight gain, insomnia, anxiety, dizziness, headache, and nausea.  Some of these get better after the first 2-4 weeks.  We may have to try several different medications before we find the one that's right for you.  Some patients experience worsening of mood and can even have suicidal thoughts when they start these medications.  If that is the case go to the ER.  Touch base with me in 2 weeks in office visit. .Slight increased risk of suicide with SSRI group of medications discussed    Consider therapy - CBT - cognitive behavioral therapy (eCBT debbi)   Vitamin D 2000 IU daily , also recommend a multivitamin, and fish oil daily  Avoid Caffeine and alcohol   Valerian root extract for relaxation and sleep OR Melatonin at bedtime.    \"The Chemistry of Calm\" by Nick Austin . Also \"Chemistry of Mandy\" book and Work book by same author  \"Hope and Help for your Nerves\" by Teresa Bryan   If you are experiencing a mental health crisis call the crisis response provider in your community :  Alin: " Adult [phone] children [phone]  Iron: Adult [phone] children [phone]  In a life threatening emergency , call 911     Otherwise if need help for urgent mental health please [facility]

## 2018-06-02 ASSESSMENT — ANXIETY QUESTIONNAIRES: GAD7 TOTAL SCORE: 10

## 2018-06-02 ASSESSMENT — PATIENT HEALTH QUESTIONNAIRE - PHQ9: SUM OF ALL RESPONSES TO PHQ QUESTIONS 1-9: 20

## 2018-06-03 PROBLEM — Z78.9 NOT IMMUNE TO HEPATITIS B VIRUS: Status: ACTIVE | Noted: 2018-06-03

## 2018-06-15 ENCOUNTER — OFFICE VISIT (OUTPATIENT)
Dept: FAMILY MEDICINE | Facility: CLINIC | Age: 23
End: 2018-06-15
Payer: COMMERCIAL

## 2018-06-15 VITALS
SYSTOLIC BLOOD PRESSURE: 120 MMHG | TEMPERATURE: 99.2 F | OXYGEN SATURATION: 98 % | RESPIRATION RATE: 15 BRPM | DIASTOLIC BLOOD PRESSURE: 80 MMHG | BODY MASS INDEX: 30.81 KG/M2 | WEIGHT: 179.5 LBS | HEART RATE: 82 BPM

## 2018-06-15 DIAGNOSIS — F33.2 SEVERE EPISODE OF RECURRENT MAJOR DEPRESSIVE DISORDER, WITHOUT PSYCHOTIC FEATURES (H): Primary | ICD-10-CM

## 2018-06-15 DIAGNOSIS — F41.1 GENERALIZED ANXIETY DISORDER: ICD-10-CM

## 2018-06-15 DIAGNOSIS — Z78.9 NOT IMMUNE TO HEPATITIS B VIRUS: ICD-10-CM

## 2018-06-15 DIAGNOSIS — Z23 NEED FOR HEPATITIS B VACCINATION: ICD-10-CM

## 2018-06-15 PROCEDURE — 90471 IMMUNIZATION ADMIN: CPT | Performed by: FAMILY MEDICINE

## 2018-06-15 PROCEDURE — 90746 HEPB VACCINE 3 DOSE ADULT IM: CPT | Performed by: FAMILY MEDICINE

## 2018-06-15 PROCEDURE — 99214 OFFICE O/P EST MOD 30 MIN: CPT | Mod: 25 | Performed by: FAMILY MEDICINE

## 2018-06-15 ASSESSMENT — PATIENT HEALTH QUESTIONNAIRE - PHQ9: 5. POOR APPETITE OR OVEREATING: MORE THAN HALF THE DAYS

## 2018-06-15 ASSESSMENT — ANXIETY QUESTIONNAIRES
IF YOU CHECKED OFF ANY PROBLEMS ON THIS QUESTIONNAIRE, HOW DIFFICULT HAVE THESE PROBLEMS MADE IT FOR YOU TO DO YOUR WORK, TAKE CARE OF THINGS AT HOME, OR GET ALONG WITH OTHER PEOPLE: SOMEWHAT DIFFICULT
GAD7 TOTAL SCORE: 7
2. NOT BEING ABLE TO STOP OR CONTROL WORRYING: SEVERAL DAYS
1. FEELING NERVOUS, ANXIOUS, OR ON EDGE: SEVERAL DAYS
7. FEELING AFRAID AS IF SOMETHING AWFUL MIGHT HAPPEN: NOT AT ALL
6. BECOMING EASILY ANNOYED OR IRRITABLE: SEVERAL DAYS
3. WORRYING TOO MUCH ABOUT DIFFERENT THINGS: SEVERAL DAYS
5. BEING SO RESTLESS THAT IT IS HARD TO SIT STILL: SEVERAL DAYS

## 2018-06-15 NOTE — NURSING NOTE
Screening Questionnaire for Adult Immunization    Are you sick today?   No   Do you have allergies to medications, food, a vaccine component or latex?   No   Have you ever had a serious reaction after receiving a vaccination?   No   Do you have a long-term health problem with heart disease, lung disease, asthma, kidney disease, metabolic disease (e.g. diabetes), anemia, or other blood disorder?   No   Do you have cancer, leukemia, HIV/AIDS, or any other immune system problem?   No   In the past 3 months, have you taken medications that affect  your immune system, such as prednisone, other steroids, or anticancer drugs; drugs for the treatment of rheumatoid arthritis, Crohn s disease, or psoriasis; or have you had radiation treatments?   No   Have you had a seizure, or a brain or other nervous system problem?   No   During the past year, have you received a transfusion of blood or blood     products, or been given immune (gamma) globulin or antiviral drug?   No   For women: Are you pregnant or is there a chance you could become        pregnant during the next month?   No   Have you received any vaccinations in the past 4 weeks?   No     Immunization questionnaire answers were all negative.        Per orders of Andria Lopez, injection of HEP B given by Kaylah Epstein. Patient instructed to remain in clinic for 15 minutes afterwards, and to report any adverse reaction to me immediately.       Screening performed by Kaylah Epstein on 6/15/2018 at 4:11 PM.

## 2018-06-15 NOTE — PROGRESS NOTES
SUBJECTIVE:   Chelo Garcia is a 23 year old female who presents to clinic today for the following health issues:    Depression and Anxiety Follow-Up    Status since last visit: No change    Other associated symptoms:None    Complicating factors:     Significant life event: No     Current substance abuse: None    PHQ-9 1/29/2018 6/1/2018 6/15/2018   Total Score 14 20 12   Q9: Suicide Ideation More than half the days More than half the days Several days   F/U: Thoughts of suicide or self-harm Yes - Yes   F/U: Self harm-plan No - Yes   F/U: Self-harm action No - No   F/U: Safety concerns No - No     CHASE-7 SCORE 1/29/2018 6/1/2018 6/15/2018   Total Score 4 10 7     {PROVIDER ONLY Complete follow-up questions for patients who report suicide ideation  (Optional):598213}  PHQ-9  English  PHQ-9   Any Language  CHASE-7  Suicide Assessment Five-step Evaluation and Treatment (SAFE-T)    Amount of exercise or physical activity: 4-5 days/week for an average of greater than 60 minutes    Problems taking medications regularly: No    Medication side effects: none    Diet: regular (no restrictions)  CHASE-7   [employer], 2002; Used with Permission) 6/15/2018   1. Feeling nervous, anxious, or on edge 1   2. Not being able to stop or control worrying 1   3. Worrying too much about different things 1   4. Trouble relaxing 2   5. Being so restless that it is hard to sit still 1   6. Becoming easily annoyed or irritable 1   7. Feeling afraid, as if something awful might happen 0   CHASE-7 Total Score 7   If you checked any problems, how difficult have they made it for you to do your work, take care of things at home, or get along with other people? Somewhat difficult   PHQ-9 ([employer]) 6/15/2018   1.  Little interest or pleasure in doing things 2   2.  Feeling down, depressed, or hopeless 2   3.  Trouble falling or staying asleep, or sleeping too much 2   4.  Feeling tired or having little energy 2   5.  Poor appetite or overeating 1   6.   Feeling bad about yourself 1   7.  Trouble concentrating 1   8.  Moving slowly or restless 0   9.  Suicidal or self-harm thoughts 1   PHQ-9 Total Score 12   Difficulty at work, home, or with people Somewhat difficult   In the past two weeks have you had thoughts of suicide or self harm? Yes   Do you have concerns about your personal safety or the safety of others? No   In the past 2 weeks have you thought about a plan or had intention to harm yourself? Yes   In the past 2 weeks have you acted on these thoughts in any way? No   1.  Little interest or pleasure in doing things    2.  Feeling down, depressed, or hopeless    3.  Trouble falling or staying asleep, or sleeping too much    4.  Feeling tired or having little energy    5.  Poor appetite or overeating    6.  Feeling bad about yourself    7.  Trouble concentrating    8.  Moving slowly or restless    9.  Suicidal or self-harm thoughts    PHQ-9 via [employer] TOTAL SCORE----->    Difficulty at work, home, or with people    F/U: Thoughts of suicide or self harm?    F/U: Plan or intention for self harm?    F/U: Acted on thoughts?    F/U: Safety concerns for self or others?        Hx of BMI 30, chronic mild low back pain intermittently, CHASE, MDD, resolved proteinuria, on microgestin BCP, seen 1/29/18 for a physical. At that time declined meds, to speak to Konrad & referred for CBT.cbc, UA, CMP, TSH was normal. LDL was mildly elevated & wet prep showed BV & given metro gel. Std testing was all negative but not immune to hepatitis B. Advised revaccination but didn't come in. Called and given metronidazole & diflucan in feb & symptoms of BV finally resolved. Sen then by gyn 2/21 & put on BCP. 5/4 seen for external hemorrhoids that are not bothering her too much currently. 5/17 seen finally by psychology. Reports no alcohol or drug use. pap negative 2016 due in 2019,  Uses OTC sleeping aid  to sleep, takes 2 pills every other day. nausea once the other day, may have been  "related to anxiety. *** [place]  negative    Safety plan in place crisis plan in place  Friend, and then sig other  unisom 2 pills.   {additional problems for provider to add:723051}    Problem list and histories reviewed & adjusted, as indicated.  Additional history: {NONE - AS DOCUMENTED:736282::\"as documented\"}    {HIST REVIEW/ LINKS 2:871197}    Reviewed and updated as needed this visit by clinical staff  Tobacco  Allergies  Meds  Med Hx  Surg Hx  Fam Hx  Soc Hx      Reviewed and updated as needed this visit by Provider         {PROVIDER CHARTING PREFERENCE:309317}  "

## 2018-06-15 NOTE — PATIENT INSTRUCTIONS
Increase sertraline to 75 mg daily   Continue see psychologist   Hep B # 1 of revaccination series today  2nd in 2 months and 3rd in 6 months and then will check for immunity after that  See you back in 3 weeks  Call us if has any concerns before that

## 2018-06-15 NOTE — MR AVS SNAPSHOT
After Visit Summary   6/15/2018    Chelo Garcia    MRN: [id number]           Patient Information     Date Of Birth          1995        Visit Information        Provider Department      6/15/2018 3:40 PM Andria Pearce MD [facility]        Today's Diagnoses     Severe episode of recurrent major depressive disorder, without psychotic features (H)    -  1    Generalized anxiety disorder        Not immune to hepatitis B virus        Need for hepatitis B vaccination          Care Instructions    Increase sertraline to 75 mg daily   Continue see psychologist   Hep B # 1 of revaccination series today  2nd in 2 months and 3rd in 6 months and then will check for immunity after that  See you back in 3 weeks  Call us if has any concerns before that           Follow-ups after your visit        Your next 10 appointments already scheduled     Jul 05, 2018  5:30 PM CDT   Return Visit with Uzma Hardin [facility] ([facility]  [address] [id number]  [facility] [address] [phone]              Who to contact     If you have questions or need follow up information about today's clinic visit or your schedule please contact [facility] directly at [phone].  Normal or non-critical lab and imaging results will be communicated to you by MyChart, letter or phone within 4 business days after the clinic has received the results. If you do not hear from us within 7 days, please contact the clinic through MyChart or phone. If you have a critical or abnormal lab result, we will notify you by phone as soon as possible.  Submit refill requests through [employer]BT / [employer] or call your pharmacy and they will forward the refill request to us. Please allow 3 business days for your refill to be completed.          Additional Information About Your Visit        MyChart Information     [employer] / [employer] gives you secure access to your  electronic health record. If you see a primary care provider, you can also send messages to your care team and make appointments. If you have questions, please call your primary care clinic.  If you do not have a primary care provider, please call [phone] and they will assist you.        Care EveryWhere ID     This is your Care EveryWhere ID. This could be used by other organizations to access your [place] medical records  [id number]        Your Vitals Were     Pulse Temperature Respirations Last Period Pulse Oximetry BMI (Body Mass Index)    82 99.2  F (37.3  C) (Oral) 15 05/28/2018 (Exact Date) 98% 30.81 kg/m2       Blood Pressure from Last 3 Encounters:   06/15/18 120/80   06/01/18 123/81   05/04/18 121/84    Weight from Last 3 Encounters:   06/15/18 179 lb 8 oz (81.4 kg)   06/01/18 177 lb (80.3 kg)   05/04/18 179 lb 8 oz (81.4 kg)              We Performed the Following     HEPATITIS B VACCINE, ADULT, IM     VACCINE ADMINISTRATION, INITIAL          Today's Medication Changes          These changes are accurate as of 6/15/18  4:06 PM.  If you have any questions, ask your nurse or doctor.               These medicines have changed or have updated prescriptions.        Dose/Directions    sertraline 50 MG tablet   Commonly known as:  ZOLOFT   This may have changed:    - how much to take  - how to take this  - when to take this  - additional instructions   Used for:  Severe episode of recurrent major depressive disorder, without psychotic features (H), Generalized anxiety disorder   Changed by:  Andria Pearce MD        Dose:  75 mg   Take 1.5 tablets (75 mg) by mouth daily   Quantity:  45 tablet   Refills:  0            Where to get your medicines      These medications were sent to [facility] [address] [facility] [address] [place] [address] AT [address] [address]    Hours:  24-hours Phone:  [phone]     sertraline 50 MG tablet                 Primary Care Provider Office Phone # Fax #    Andria Pearce [phone] [address]  [facility] [address]        Equal Access to Services     CYNDIE PETERSEN : Hadii aad ku hadnicolasthalia Gricelda, wapalda luqtarik, qaybta kaelada fransisco, joey troncoso laNataliapiero spence. So [facility] [phone].    ATENCIÓN: Si habla español, tiene a easley disposición servicios gratuitos de asistencia lingüística. Llame al [phone].    We comply with applicable federal civil rights laws and [place] laws. We do not discriminate on the basis of race, color, national origin, age, disability, sex, sexual orientation, or gender identity.            Thank you!     Thank you for choosing [facility]  for your care. Our goal is always to provide you with excellent care. Hearing back from our patients is one way we can continue to improve our services. Please take a few minutes to complete the written survey that you may receive in the mail after your visit with us. Thank you!             Your Updated Medication List - Protect others around you: Learn how to safely use, store and throw away your medicines at www.disposemymeds.org.          This list is accurate as of 6/15/18  4:06 PM.  Always use your most recent med list.                   Brand Name Dispense Instructions for use Diagnosis    hydrOXYzine 25 MG tablet    ATARAX    10 tablet    Take 1 tablet (25 mg) by mouth every 8 hours as needed for anxiety    Generalized anxiety disorder       norethindrone-ethinyl estradiol 1-20 MG-MCG per tablet    MICROGESTIN    84 tablet    Take 1 tablet by mouth daily    Encounter for contraceptive management, unspecified type       sertraline 50 MG tablet    ZOLOFT    45 tablet    Take 1.5 tablets (75 mg) by mouth daily    Severe episode of recurrent major depressive disorder, without psychotic features (H), Generalized anxiety disorder

## 2018-06-16 ASSESSMENT — PATIENT HEALTH QUESTIONNAIRE - PHQ9: SUM OF ALL RESPONSES TO PHQ QUESTIONS 1-9: 12

## 2018-06-16 ASSESSMENT — ANXIETY QUESTIONNAIRES: GAD7 TOTAL SCORE: 7

## 2018-06-16 NOTE — PROGRESS NOTES
SUBJECTIVE:   Chleo Garcia is a 23 year old female who presents to clinic today for the following health issues:    Depression and Anxiety Follow-Up    Status since last visit: No change    Other associated symptoms:None    Complicating factors:     Significant life event: No     Current substance abuse: None    PHQ-9 1/29/2018 6/1/2018 6/15/2018   Total Score 14 20 12   Q9: Suicide Ideation More than half the days More than half the days Several days   F/U: Thoughts of suicide or self-harm Yes - Yes   F/U: Self harm-plan No - Yes   F/U: Self-harm action No - No   F/U: Safety concerns No - No     CHASE-7 SCORE 1/29/2018 6/1/2018 6/15/2018   Total Score 4 10 7     In the past two weeks have you had thoughts of suicide or self-harm?  Yes  In the past two weeks have you thought of a plan or intent to harm yourself? No.  Do you have concerns about your personal safety or the safety of others?   No  PHQ-9  English  PHQ-9   Any Language  CHASE-7  Suicide Assessment Five-step Evaluation and Treatment (SAFE-T)    Amount of exercise or physical activity: 4-5 days/week for an average of greater than 60 minutes    Problems taking medications regularly: No    Medication side effects: none    Diet: regular (no restrictions)  CHASE-7   [employer], 2002; Used with Permission) 6/15/2018   1. Feeling nervous, anxious, or on edge 1   2. Not being able to stop or control worrying 1   3. Worrying too much about different things 1   4. Trouble relaxing 2   5. Being so restless that it is hard to sit still 1   6. Becoming easily annoyed or irritable 1   7. Feeling afraid, as if something awful might happen 0   CHASE-7 Total Score 7   If you checked any problems, how difficult have they made it for you to do your work, take care of things at home, or get along with other people? Somewhat difficult   PHQ-9 ([employer]) 6/15/2018   1.  Little interest or pleasure in doing things 2   2.  Feeling down, depressed, or hopeless 2   3.  Trouble  falling or staying asleep, or sleeping too much 2   4.  Feeling tired or having little energy 2   5.  Poor appetite or overeating 1   6.  Feeling bad about yourself 1   7.  Trouble concentrating 1   8.  Moving slowly or restless 0   9.  Suicidal or self-harm thoughts 1   PHQ-9 Total Score 12   Difficulty at work, home, or with people Somewhat difficult   In the past two weeks have you had thoughts of suicide or self harm? Yes   Do you have concerns about your personal safety or the safety of others? No   In the past 2 weeks have you thought about a plan or had intention to harm yourself? Yes   In the past 2 weeks have you acted on these thoughts in any way? No   1.  Little interest or pleasure in doing things    2.  Feeling down, depressed, or hopeless    3.  Trouble falling or staying asleep, or sleeping too much    4.  Feeling tired or having little energy    5.  Poor appetite or overeating    6.  Feeling bad about yourself    7.  Trouble concentrating    8.  Moving slowly or restless    9.  Suicidal or self-harm thoughts    PHQ-9 via My Chart TOTAL SCORE----->    Difficulty at work, home, or with people    F/U: Thoughts of suicide or self harm?    F/U: Plan or intention for self harm?    F/U: Acted on thoughts?    F/U: Safety concerns for self or others?        Hx of BMI 30, chronic mild low back pain intermittently, CHASE on atarax prn not used, MDD on sertraline, resolved proteinuria, on microgestin BCP, seen 1/29/18 for a physical. At that time declined meds, to speak to Konrad & referred for CBT.cbc, UA, CMP, TSH was normal. LDL was mildly elevated & wet prep showed BV & given metro gel. Std testing was all negative but not immune to hepatitis B. Advised revaccination but didn't come in. Called and given metronidazole & diflucan in feb & symptoms of BV finally resolved. Sen then by gyn 2/21 & put on BCP. 5/4 seen for external hemorrhoids that are not bothering her too much currently. 5/17 seen finally by  psychology. Reports no alcohol or drug use. pap negative 2016 due in 2019,  Uses OTC sleeping aid  to sleep, takes 2 pills every other day. nausea once the other day, may have been related to anxiety. Sen 6/1/18 started on sertraline , given atarax prn, continued on CBT and advised Vit D. [place]  negative  Continues with chronic passive suicidal thoughts. No active plan. Not any worse. Has a Safety plan in place /crisis plan in place with therapist. Contracts to safety. Friends and sig other are supportive. Still using Unisom about 2 pills a day. Denying use of alcohol or drugs currently. Seeing a therapist but due to expense only once a month.. Here for med follow up.  On sertraline 50 mg currently. agreeable to revaccinating with Hep B as not immune.  No fever or chills, no headache or dizziness, no double or blurry vision, no facial pain, earache, sore throat, runny nose, post nasal drip, no trouble hearing, smelling, tasting or swallowing, no cough , no chest pain, trouble breathing or palpitations, No abdominal pain, heart burn, reflux, nausea or vomiting or diarrhea or constipation, no blood in stools or black stools, no weight loss or night sweats. No dysuria, hematuria, frequency, urgency, hesitancy, incontinence, No pelvic complaints. No leg swelling or joint pain. No rash.  Problem list and histories reviewed & adjusted, as indicated.  Additional history: as documented    Patient Active Problem List   Diagnosis     Chronic midline low back pain without sciatica     BMI 30.0-30.9,adult     Major depressive disorder, recurrent episode, severe (H)     Generalized anxiety disorder     Not immune to hepatitis B virus     History reviewed. No pertinent surgical history.    Social History   Substance Use Topics     Smoking status: Never Smoker     Smokeless tobacco: Never Used     Alcohol use Yes      Comment: socially.     Family History   Problem Relation Age of Onset     Ovarian Cancer Mother      first had  cervical caner , 2016 got ovarian caner at age 38      Cervical Cancer Mother      Breast Cancer Other          No Known Allergies  Recent Labs   Lab Test  01/29/18   1028   A1C  5.1   LDL  127*   HDL  68   TRIG  98   ALT  16   CR  0.75   GFRESTIMATED  >90   GFRESTBLACK  >90   POTASSIUM  4.1   TSH  0.96      BP Readings from Last 3 Encounters:   06/15/18 120/80   06/01/18 123/81   05/04/18 121/84    Wt Readings from Last 3 Encounters:   06/15/18 179 lb 8 oz (81.4 kg)   06/01/18 177 lb (80.3 kg)   05/04/18 179 lb 8 oz (81.4 kg)                  Labs reviewed in EPIC    Reviewed and updated as needed this visit by clinical staff  Tobacco  Allergies  Meds  Med Hx  Surg Hx  Fam Hx  Soc Hx      Reviewed and updated as needed this visit by Provider  Allergies  Meds         ROS:  Constitutional, HEENT, cardiovascular, pulmonary, GI, , musculoskeletal, neuro, skin, endocrine and psych systems are negative, except as otherwise noted.    OBJECTIVE:     /80 (BP Location: Right arm, Patient Position: Chair, Cuff Size: Adult Regular)  Pulse 82  Temp 99.2  F (37.3  C) (Oral)  Resp 15  Wt 179 lb 8 oz (81.4 kg)  LMP 05/28/2018 (Exact Date)  SpO2 98%  BMI 30.81 kg/m2  Body mass index is 30.81 kg/(m^2).  GENERAL: healthy, alert and no distress  EYES: Eyes grossly normal to inspection, PERRL and conjunctivae and sclerae normal  RESP: lungs clear to auscultation - no rales, rhonchi or wheezes  CV: regular rates and rhythm, normal S1 S2, no S3 or S4 and no murmur, click or rub  MS: no gross musculoskeletal defects noted, no edema  SKIN: no suspicious lesions or rashes, tattoos  NEURO: Normal strength and tone, mentation intact and speech normal  PSYCH: mentation appears normal, affect normal/bright    Diagnostic Test Results:  No results found for this or any previous visit (from the past 24 hour(s)).    ASSESSMENT/PLAN:     1. Severe episode of recurrent major depressive disorder, without psychotic features  (H)  Chronic passive SI thoughts, no active plan. crisis, safety plan in place with her therapist and contracts to safety today. Seeing therapist once a month. Increase sertraline to 75 mg daily   Continue to see the psychologist. See back in 3 weeks. Call us if has any concerns before that.  - sertraline (ZOLOFT) 50 MG tablet; Take 1.5 tablets (75 mg) by mouth daily  Dispense: 45 tablet; Refill: 0    2. Generalized anxiety disorder  Has not required to use atarax.  - sertraline (ZOLOFT) 50 MG tablet; Take 1.5 tablets (75 mg) by mouth daily  Dispense: 45 tablet; Refill: 0    3. Not immune to hepatitis B virus  Agrees to getting revaccination series.    4. Need for hepatitis B vaccination  Hep B # 1 of revaccination series today. 2nd in 2 months and 3rd in 6 months and then will check for immunity after that  - VACCINE ADMINISTRATION, INITIAL  - HEPATITIS B VACCINE, ADULT, IM    See Patient Instructions    Andria Pearce MD  [facility] [place]

## 2018-06-26 ENCOUNTER — MYC MEDICAL ADVICE (OUTPATIENT)
Dept: PSYCHOLOGY | Facility: CLINIC | Age: 23
End: 2018-06-26

## 2018-06-26 ENCOUNTER — TELEPHONE (OUTPATIENT)
Dept: PSYCHOLOGY | Facility: CLINIC | Age: 23
End: 2018-06-26

## 2018-06-28 ENCOUNTER — FCC EXTENDED DOCUMENTATION (OUTPATIENT)
Dept: PSYCHOLOGY | Facility: CLINIC | Age: 23
End: 2018-06-28
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
## 2018-07-09 ENCOUNTER — OFFICE VISIT (OUTPATIENT)
Dept: FAMILY MEDICINE | Facility: CLINIC | Age: 23
End: 2018-07-09
Payer: COMMERCIAL

## 2018-07-09 VITALS
HEART RATE: 58 BPM | WEIGHT: 174 LBS | OXYGEN SATURATION: 99 % | RESPIRATION RATE: 16 BRPM | SYSTOLIC BLOOD PRESSURE: 118 MMHG | DIASTOLIC BLOOD PRESSURE: 82 MMHG | BODY MASS INDEX: 29.87 KG/M2 | TEMPERATURE: 98 F

## 2018-07-09 DIAGNOSIS — F41.1 GENERALIZED ANXIETY DISORDER: ICD-10-CM

## 2018-07-09 DIAGNOSIS — F33.2 SEVERE EPISODE OF RECURRENT MAJOR DEPRESSIVE DISORDER, WITHOUT PSYCHOTIC FEATURES (H): Primary | ICD-10-CM

## 2018-07-09 PROCEDURE — 99214 OFFICE O/P EST MOD 30 MIN: CPT | Performed by: FAMILY MEDICINE

## 2018-07-09 RX ORDER — SERTRALINE HYDROCHLORIDE 100 MG/1
100 TABLET, FILM COATED ORAL DAILY
Qty: 30 TABLET | Refills: 3 | Status: SHIPPED | OUTPATIENT
Start: 2018-07-09 | End: 2018-09-20

## 2018-07-09 ASSESSMENT — ANXIETY QUESTIONNAIRES
6. BECOMING EASILY ANNOYED OR IRRITABLE: NOT AT ALL
5. BEING SO RESTLESS THAT IT IS HARD TO SIT STILL: NOT AT ALL
7. FEELING AFRAID AS IF SOMETHING AWFUL MIGHT HAPPEN: NOT AT ALL
GAD7 TOTAL SCORE: 4
3. WORRYING TOO MUCH ABOUT DIFFERENT THINGS: SEVERAL DAYS
IF YOU CHECKED OFF ANY PROBLEMS ON THIS QUESTIONNAIRE, HOW DIFFICULT HAVE THESE PROBLEMS MADE IT FOR YOU TO DO YOUR WORK, TAKE CARE OF THINGS AT HOME, OR GET ALONG WITH OTHER PEOPLE: SOMEWHAT DIFFICULT
2. NOT BEING ABLE TO STOP OR CONTROL WORRYING: NOT AT ALL
1. FEELING NERVOUS, ANXIOUS, OR ON EDGE: MORE THAN HALF THE DAYS

## 2018-07-09 ASSESSMENT — PATIENT HEALTH QUESTIONNAIRE - PHQ9: 5. POOR APPETITE OR OVEREATING: SEVERAL DAYS

## 2018-07-09 NOTE — PATIENT INSTRUCTIONS
Increase sertraline to 100 mg daily   See you back in august  Will get second part of revaccination Hep B in august

## 2018-07-09 NOTE — PROGRESS NOTES
SUBJECTIVE:   Chelo Garcia is a 23 year old female who presents to clinic today for the following health issues:    Depression and Anxiety Follow-Up    Status since last visit: Improved  But there are still some days that is hard    Other associated symptoms:None    Complicating factors:     Significant life event: Yes-  Moved to pt first apartment     Current substance abuse: None    PHQ-9 6/1/2018 6/15/2018 7/9/2018   Total Score 20 12 10   Q9: Suicide Ideation More than half the days Several days Not at all   F/U: Thoughts of suicide or self-harm - Yes No   F/U: Self harm-plan - Yes No   F/U: Self-harm action - No No   F/U: Safety concerns - No No     CHASE-7 SCORE 6/1/2018 6/15/2018 7/9/2018   Total Score 10 7 4     In the past two weeks have you had thoughts of suicide or self-harm?  No.    Do you have concerns about your personal safety or the safety of others?   No  PHQ-9  English  PHQ-9   Any Language  CHASE-7  Suicide Assessment Five-step Evaluation and Treatment (SAFE-T)    Amount of exercise or physical activity: 4-5 days/week for an average of greater than 60 minutes    Problems taking medications regularly: No    Medication side effects: none    Diet: regular (no restrictions)    No fever or chills, no headache or dizziness, no double or blurry vision, no facial pain, earache, sore throat, runny nose, post nasal drip, no trouble hearing, smelling, tasting or swallowing, no cough , no chest pain, trouble breathing or palpitations, No abdominal pain, heart burn, reflux, nausea or vomiting or diarrhea or constipation, no blood in stools or black stools, no weight loss or night sweats. No dysuria, hematuria, frequency, urgency, hesitancy, incontinence, No pelvic complaints. No leg swelling or joint pain. No rash.    Used hydroxyzine once. Made her drowsy. Still Using OTC sleep aid but down to 2 pills three times a week    Alcohol: reports currently rare use    Boyfriend remains supportive. Just got a  gilberto and it is helping her mood.    Hx of BMI 30, chronic mild low back pain intermittently, CHASE on atarax prn not used, MDD on sertraline, resolved proteinuria, on microgestin BCP, seen 1/29/18 for a physical. At that time declined meds, to speak to Konrad & referred for CBT.cbc, UA, CMP, TSH was normal. LDL was mildly elevated & wet prep showed BV & given metro gel. Std testing was all negative but not immune to hepatitis B. Advised revaccination but didn't come in. Called and given metronidazole & diflucan in feb & symptoms of BV finally resolved. Sen then by gyn 2/21 & put on BCP. 5/4 seen for external hemorrhoids that are not bothering her too much currently. 5/17 seen finally by psychology. Reports no alcohol or drug use. pap negative 2016 due in 2019,  Uses OTC sleeping aid  to sleep, takes 2 pills every other day. nausea once the other day, may have been related to anxiety. Seen 6/1/18 started on sertraline , given atarax prn, continued on CBT and advised Vit D. Seen 6/15/18 Continued with chronic passive suicidal thoughts. No active plan. Not any worse. Has a Safety plan in place /crisis plan in place with therapist. Contracts to safety. Friends and sig other are supportive. Was  using Unisom about 2 pills a day. Denied use of alcohol or drugs when I saw her. Was seeing a therapist but due to expense was down to only once a month.  Given revaccination of Hep B as not immune. Increased sertraline to 75 mg daily. Seen 6/28 by Counsellor, reviewed safety plan, noted self cutting, unable to afford counseling, given resources for free counseling. Not looked into it yet. [place]  negative   Problem list and histories reviewed & adjusted, as indicated.  Additional history: as documented    Patient Active Problem List   Diagnosis     Chronic midline low back pain without sciatica     BMI 30.0-30.9,adult     Major depressive disorder, recurrent episode, severe (H)     Generalized anxiety disorder     Not immune to  hepatitis B virus     History reviewed. No pertinent surgical history.    Social History   Substance Use Topics     Smoking status: Never Smoker     Smokeless tobacco: Never Used     Alcohol use Yes      Comment: socially.     Family History   Problem Relation Age of Onset     Ovarian Cancer Mother      first had cervical caner , 2016 got ovarian caner at age 38      Cervical Cancer Mother      Breast Cancer Other          Current Outpatient Prescriptions   Medication Sig Dispense Refill     hydrOXYzine (ATARAX) 25 MG tablet Take 1 tablet (25 mg) by mouth every 8 hours as needed for anxiety 10 tablet 0     norethindrone-ethinyl estradiol (MICROGESTIN) 1-20 MG-MCG per tablet Take 1 tablet by mouth daily 84 tablet 3     sertraline (ZOLOFT) 100 MG tablet Take 1 tablet (100 mg) by mouth daily 30 tablet 3     No Known Allergies  Recent Labs   Lab Test  01/29/18   1028   A1C  5.1   LDL  127*   HDL  68   TRIG  98   ALT  16   CR  0.75   GFRESTIMATED  >90   GFRESTBLACK  >90   POTASSIUM  4.1   TSH  0.96      BP Readings from Last 3 Encounters:   07/09/18 118/82   06/15/18 120/80   06/01/18 123/81    Wt Readings from Last 3 Encounters:   07/09/18 174 lb (78.9 kg)   06/15/18 179 lb 8 oz (81.4 kg)   06/01/18 177 lb (80.3 kg)                  Labs reviewed in EPIC    Reviewed and updated as needed this visit by clinical staff  Tobacco  Allergies  Meds  Med Hx  Surg Hx  Fam Hx  Soc Hx      Reviewed and updated as needed this visit by Provider         ROS:  Constitutional, HEENT, cardiovascular, pulmonary, GI, , musculoskeletal, neuro, skin, endocrine and psych systems are negative, except as otherwise noted.    OBJECTIVE:     /82 (BP Location: Right arm, Patient Position: Chair, Cuff Size: Adult Large)  Pulse 58  Temp 98  F (36.7  C) (Oral)  Resp 16  Wt 174 lb (78.9 kg)  SpO2 99%  BMI 29.87 kg/m2  Body mass index is 29.87 kg/(m^2).  GENERAL: healthy, alert and no distress  EYES: Eyes grossly normal to  inspection, PERRL and conjunctivae and sclerae normal  RESP: lungs clear to auscultation - no rales, rhonchi or wheezes  CV: regular rate and rhythm, normal S1 S2, no S3 or S4, no murmur, click or rub, no peripheral edema and peripheral pulses strong  ABDOMEN: soft, non tender, no hepatosplenomegaly, no masses and bowel sounds normal  MS: no gross musculoskeletal defects noted, no edema  SKIN: no suspicious lesions or rashes  NEURO: Normal strength and tone, mentation intact and speech normal  PSYCH: mentation appears normal, affect normal/bright    Diagnostic Test Results:  No results found for this or any previous visit (from the past 24 hour(s)).    ASSESSMENT/PLAN:     1. Severe episode of recurrent major depressive disorder, without psychotic features (H)  Increase sertraline to 100 mg daily. See you back in august. Will get second part of revaccination Hep B in august.  - sertraline (ZOLOFT) 100 MG tablet; Take 1 tablet (100 mg) by mouth daily  Dispense: 30 tablet; Refill: 3    2. Generalized anxiety disorder  - sertraline (ZOLOFT) 100 MG tablet; Take 1 tablet (100 mg) by mouth daily  Dispense: 30 tablet; Refill: 3    See Patient Instructions    Andria Pearce MD  [facility]

## 2018-07-09 NOTE — MR AVS SNAPSHOT
After Visit Summary   7/9/2018    Chelo Garcia    MRN: [id number]           Patient Information     Date Of Birth          1995        Visit Information        Provider Department      7/9/2018 6:00 PM Andria Pearce MD [facility]        Today's Diagnoses     Severe episode of recurrent major depressive disorder, without psychotic features (H)    -  1    Generalized anxiety disorder          Care Instructions    Increase sertraline to 100 mg daily   See you back in august  Will get second part of revaccination Hep B in august             Follow-ups after your visit        Who to contact     If you have questions or need follow up information about today's clinic visit or your schedule please contact [facility] directly at [phone].  Normal or non-critical lab and imaging results will be communicated to you by OWMhart, letter or phone within 4 business days after the clinic has received the results. If you do not hear from us within 7 days, please contact the clinic through OWMhart or phone. If you have a critical or abnormal lab result, we will notify you by phone as soon as possible.  Submit refill requests through [employer] or call your pharmacy and they will forward the refill request to us. Please allow 3 business days for your refill to be completed.          Additional Information About Your Visit        MyChart Information     [employer] gives you secure access to your electronic health record. If you see a primary care provider, you can also send messages to your care team and make appointments. If you have questions, please call your primary care clinic.  If you do not have a primary care provider, please call [phone] and they will assist you.        Care EveryWhere ID     This is your Care EveryWhere ID. This could be used by other organizations to access your [place] medical records  [id number]        Your Vitals Were     Pulse Temperature  Respirations Pulse Oximetry BMI (Body Mass Index)       58 98  F (36.7  C) (Oral) 16 99% 29.87 kg/m2        Blood Pressure from Last 3 Encounters:   07/09/18 118/82   06/15/18 120/80   06/01/18 123/81    Weight from Last 3 Encounters:   07/09/18 174 lb (78.9 kg)   06/15/18 179 lb 8 oz (81.4 kg)   06/01/18 177 lb (80.3 kg)              Today, you had the following     No orders found for display         Today's Medication Changes          These changes are accurate as of 7/9/18  6:35 PM.  If you have any questions, ask your nurse or doctor.               These medicines have changed or have updated prescriptions.        Dose/Directions    sertraline 100 MG tablet   Commonly known as:  ZOLOFT   This may have changed:    - medication strength  - how much to take   Used for:  Severe episode of recurrent major depressive disorder, without psychotic features (H), Generalized anxiety disorder   Changed by:  Andria Pearce MD        Dose:  100 mg   Take 1 tablet (100 mg) by mouth daily   Quantity:  30 tablet   Refills:  3            Where to get your medicines      These medications were sent to [facility] [address] [place] [address] AT [address] [address]    Hours:  24-hours Phone:  [phone]     sertraline 100 MG tablet                Primary Care Provider Office Phone # Fax #    Andria Pearce [phone] [address] [place] [id number]        Equal Access to Services     CYNDIE PETERSEN : Hadii dionne ku hadasho Sobaironali, waaxda luqadaha, qaybta kaalmada adeegyada, joey idiangelica spence. So [facility] [phone].    ATENCIÓN: Si habla español, tiene a easley disposición servicios gratuitos de asistencia lingüística. Llame al [phone].    We comply with applicable federal civil rights laws and [place] laws. We do not discriminate on the basis of race, color, national origin, age, disability, sex,  sexual orientation, or gender identity.            Thank you!     Thank you for choosing [facility]  for your care. Our goal is always to provide you with excellent care. Hearing back from our patients is one way we can continue to improve our services. Please take a few minutes to complete the written survey that you may receive in the mail after your visit with us. Thank you!             Your Updated Medication List - Protect others around you: Learn how to safely use, store and throw away your medicines at www.disposemymeds.org.          This list is accurate as of 7/9/18  6:35 PM.  Always use your most recent med list.                   Brand Name Dispense Instructions for use Diagnosis    hydrOXYzine 25 MG tablet    ATARAX    10 tablet    Take 1 tablet (25 mg) by mouth every 8 hours as needed for anxiety    Generalized anxiety disorder       norethindrone-ethinyl estradiol 1-20 MG-MCG per tablet    MICROGESTIN    84 tablet    Take 1 tablet by mouth daily    Encounter for contraceptive management, unspecified type       sertraline 100 MG tablet    ZOLOFT    30 tablet    Take 1 tablet (100 mg) by mouth daily    Severe episode of recurrent major depressive disorder, without psychotic features (H), Generalized anxiety disorder

## 2018-07-10 ASSESSMENT — ANXIETY QUESTIONNAIRES: GAD7 TOTAL SCORE: 4

## 2018-07-10 ASSESSMENT — PATIENT HEALTH QUESTIONNAIRE - PHQ9: SUM OF ALL RESPONSES TO PHQ QUESTIONS 1-9: 10

## 2018-08-13 ENCOUNTER — OFFICE VISIT (OUTPATIENT)
Dept: FAMILY MEDICINE | Facility: CLINIC | Age: 23
End: 2018-08-13
Payer: COMMERCIAL

## 2018-08-13 VITALS
HEART RATE: 74 BPM | TEMPERATURE: 98.8 F | OXYGEN SATURATION: 98 % | WEIGHT: 177.5 LBS | RESPIRATION RATE: 16 BRPM | BODY MASS INDEX: 30.47 KG/M2 | SYSTOLIC BLOOD PRESSURE: 118 MMHG | DIASTOLIC BLOOD PRESSURE: 78 MMHG

## 2018-08-13 DIAGNOSIS — Z11.59 NEED FOR HEPATITIS B SCREENING TEST: ICD-10-CM

## 2018-08-13 DIAGNOSIS — F41.1 GENERALIZED ANXIETY DISORDER: ICD-10-CM

## 2018-08-13 DIAGNOSIS — F33.2 SEVERE EPISODE OF RECURRENT MAJOR DEPRESSIVE DISORDER, WITHOUT PSYCHOTIC FEATURES (H): Primary | ICD-10-CM

## 2018-08-13 PROCEDURE — 90471 IMMUNIZATION ADMIN: CPT | Performed by: FAMILY MEDICINE

## 2018-08-13 PROCEDURE — 99214 OFFICE O/P EST MOD 30 MIN: CPT | Mod: 25 | Performed by: FAMILY MEDICINE

## 2018-08-13 PROCEDURE — 90746 HEPB VACCINE 3 DOSE ADULT IM: CPT | Performed by: FAMILY MEDICINE

## 2018-08-13 ASSESSMENT — ANXIETY QUESTIONNAIRES
1. FEELING NERVOUS, ANXIOUS, OR ON EDGE: NOT AT ALL
5. BEING SO RESTLESS THAT IT IS HARD TO SIT STILL: NOT AT ALL
6. BECOMING EASILY ANNOYED OR IRRITABLE: NOT AT ALL
GAD7 TOTAL SCORE: 0
IF YOU CHECKED OFF ANY PROBLEMS ON THIS QUESTIONNAIRE, HOW DIFFICULT HAVE THESE PROBLEMS MADE IT FOR YOU TO DO YOUR WORK, TAKE CARE OF THINGS AT HOME, OR GET ALONG WITH OTHER PEOPLE: NOT DIFFICULT AT ALL
7. FEELING AFRAID AS IF SOMETHING AWFUL MIGHT HAPPEN: NOT AT ALL
2. NOT BEING ABLE TO STOP OR CONTROL WORRYING: NOT AT ALL
3. WORRYING TOO MUCH ABOUT DIFFERENT THINGS: NOT AT ALL

## 2018-08-13 ASSESSMENT — PATIENT HEALTH QUESTIONNAIRE - PHQ9: 5. POOR APPETITE OR OVEREATING: NOT AT ALL

## 2018-08-13 NOTE — PROGRESS NOTES
SUBJECTIVE:   Chelo Garcia is a 23 year old female who presents to clinic today for the following health issues:    Depression Follow up    Status since last visit: Improved     See PHQ-9 for current symptoms.  Other associated symptoms: None    Complicating factors:   Significant life event:  No   Current substance abuse:  None  Anxiety or Panic symptoms:  No    PHQ-9 6/15/2018 7/9/2018 8/13/2018   Total Score 12 10 5   Q9: Suicide Ideation Several days Not at all Not at all   F/U: Thoughts of suicide or self-harm Yes No -   F/U: Self harm-plan Yes No -   F/U: Self-harm action No No -   F/U: Safety concerns No No -     In the past two weeks have you had thoughts of suicide or self-harm?  No.    Do you have concerns about your personal safety or the safety of others?   No  PHQ-9  English  PHQ-9   Any Language  Suicide Assessment Five-step Evaluation and Treatment (SAFE-T)    Amount of exercise or physical activity: 1 day/week for an average of less than 15 minutes    Problems taking medications regularly: No    Medication side effects: none    Diet: regular (no restrictions)    CHASE-7   [employer], 2002; Used with Permission) 8/13/2018   1. Feeling nervous, anxious, or on edge 0   2. Not being able to stop or control worrying 0   3. Worrying too much about different things 0   4. Trouble relaxing 0   5. Being so restless that it is hard to sit still 0   6. Becoming easily annoyed or irritable 0   7. Feeling afraid, as if something awful might happen 0   CHASE-7 Total Score 0   If you checked any problems, how difficult have they made it for you to do your work, take care of things at home, or get along with other people? Not difficult at all   PHQ-9 ([employer]) 8/13/2018   1.  Little interest or pleasure in doing things 1   2.  Feeling down, depressed, or hopeless 1   3.  Trouble falling or staying asleep, or sleeping too much 1   4.  Feeling tired or having little energy 1   5.  Poor appetite or overeating 1   6.   Feeling bad about yourself 0   7.  Trouble concentrating 0   8.  Moving slowly or restless 0   9.  Suicidal or self-harm thoughts 0   PHQ-9 Total Score 5   Difficulty at work, home, or with people Not difficult at all   In the past two weeks have you had thoughts of suicide or self harm?    Do you have concerns about your personal safety or the safety of others?    In the past 2 weeks have you thought about a plan or had intention to harm yourself?    In the past 2 weeks have you acted on these thoughts in any way?    1.  Little interest or pleasure in doing things    2.  Feeling down, depressed, or hopeless    3.  Trouble falling or staying asleep, or sleeping too much    4.  Feeling tired or having little energy    5.  Poor appetite or overeating    6.  Feeling bad about yourself    7.  Trouble concentrating    8.  Moving slowly or restless    9.  Suicidal or self-harm thoughts    PHQ-9 via My Chart TOTAL SCORE----->    Difficulty at work, home, or with people    F/U: Thoughts of suicide or self harm?    F/U: Plan or intention for self harm?    F/U: Acted on thoughts?    F/U: Safety concerns for self or others?      Hx of BMI 30, chronic mild low back pain intermittently, CHASE on atarax prn not used, MDD on sertraline, resolved proteinuria, on microgestin BCP, seen 1/29/18 for a physical. At that time declined meds, to speak to Konrad & referred for CBT.cbc, UA, CMP, TSH was normal. LDL was mildly elevated & wet prep showed BV & given metro gel. Std testing was all negative but not immune to hepatitis B. Advised revaccination but didn't come in. Called and given metronidazole & diflucan in feb & symptoms of BV finally resolved. Sen then by gyn 2/21 & put on BCP. 5/4 seen for external hemorrhoids that are not bothering her too much currently. 5/17 seen finally by psychology. Reports no alcohol or drug use. pap negative 2016 due in 2019,  Uses OTC sleeping aid  to sleep, takes 2 pills every other day. nausea once the  other day, may have been related to anxiety. Seen 6/1/18 started on sertraline , given atarax prn, continued on CBT and advised Vit D. Seen 6/15/18 Continued with chronic passive suicidal thoughts. No active plan. Not any worse. Has a Safety plan in place /crisis plan in place with therapist. Contracts to safety. Friends and sig other are supportive. Was  using Unisom about 2 pills a day. Denied use of alcohol or drugs when I saw her. Was seeing a therapist but due to expense was down to only once a month.  Given revaccination of Hep B as not immune. Increased sertraline to 75 mg daily. Seen 6/28 by Counsellor, reviewed safety plan, noted self cutting, unable to afford counseling, given resources for free counseling. Not looked into it yet.  seen 7/9 , got a cat , doing much better, sertraline increased to 100 mg.  Mn  negative     Today: much better. Feels good. Not used atarax. Desires to continue same dose of sertraline. Will get second Hep B shot today. No fever or chills, no headache or dizziness, no double or blurry vision, no facial pain, earache, sore throat, runny nose, post nasal drip, no trouble hearing, smelling, tasting or swallowing, no cough , no chest pain, trouble breathing or palpitations, No abdominal pain, heart burn, reflux, nausea or vomiting or diarrhea or constipation, no blood in stools or black stools, no weight loss or night sweats. No dysuria, hematuria, frequency, urgency, hesitancy, incontinence, No pelvic complaints. No leg swelling or joint pain. No rash.    Problem list and histories reviewed & adjusted, as indicated.  Additional history: as documented    Patient Active Problem List   Diagnosis     Chronic midline low back pain without sciatica     BMI 30.0-30.9,adult     Major depressive disorder, recurrent episode, severe (H)     Generalized anxiety disorder     History reviewed. No pertinent surgical history.    Social History   Substance Use Topics     Smoking status: Never  Smoker     Smokeless tobacco: Never Used     Alcohol use Yes      Comment: socially.     Family History   Problem Relation Age of Onset     Ovarian Cancer Mother      first had cervical caner , 2016 got ovarian caner at age 38      Cervical Cancer Mother      Breast Cancer Other          Current Outpatient Prescriptions   Medication Sig Dispense Refill     hydrOXYzine (ATARAX) 25 MG tablet Take 1 tablet (25 mg) by mouth every 8 hours as needed for anxiety 10 tablet 0     norethindrone-ethinyl estradiol (MICROGESTIN) 1-20 MG-MCG per tablet Take 1 tablet by mouth daily 84 tablet 3     sertraline (ZOLOFT) 100 MG tablet Take 1 tablet (100 mg) by mouth daily 30 tablet 3     No Known Allergies  Recent Labs   Lab Test  01/29/18   1028   A1C  5.1   LDL  127*   HDL  68   TRIG  98   ALT  16   CR  0.75   GFRESTIMATED  >90   GFRESTBLACK  >90   POTASSIUM  4.1   TSH  0.96      BP Readings from Last 3 Encounters:   08/13/18 118/78   07/09/18 118/82   06/15/18 120/80    Wt Readings from Last 3 Encounters:   08/13/18 177 lb 8 oz (80.5 kg)   07/09/18 174 lb (78.9 kg)   06/15/18 179 lb 8 oz (81.4 kg)         Labs reviewed in EPIC    Reviewed and updated as needed this visit by clinical staff  Tobacco  Allergies  Meds  Med Hx  Surg Hx  Fam Hx  Soc Hx      Reviewed and updated as needed this visit by Provider         ROS:  Constitutional, HEENT, cardiovascular, pulmonary, GI, , musculoskeletal, neuro, skin, endocrine and psych systems are negative, except as otherwise noted.    OBJECTIVE:     /78 (BP Location: Right arm, Patient Position: Chair, Cuff Size: Adult Large)  Pulse 74  Temp 98.8  F (37.1  C) (Oral)  Resp 16  Wt 177 lb 8 oz (80.5 kg)  LMP 07/29/2018 (LMP Unknown)  SpO2 98%  BMI 30.47 kg/m2  Body mass index is 30.47 kg/(m^2).  GENERAL: healthy, alert and no distress  EYES: Eyes grossly normal to inspection, PERRL and conjunctivae and sclerae normal  RESP: lungs clear to auscultation - no rales, rhonchi or  wheezes  CV: regular rate and rhythm, normal S1 S2, no S3 or S4, no murmur, click or rub, no peripheral edema and peripheral pulses strong  ABDOMEN: soft, non tender  MS: no gross musculoskeletal defects noted, no edema  SKIN: no suspicious lesions or rashes  NEURO: Normal strength and tone, mentation intact and speech normal  PSYCH: mentation appears normal, affect normal/bright    Diagnostic Test Results:  none     ASSESSMENT/PLAN:     1. Severe episode of recurrent major depressive disorder, without psychotic features (H)  Doing much better. Continue sertraline 100 mg daily. Has enough refills till I see her back. See back in 2 months or earlier  Another good website to check out is Dr Alejandro Puentes's website I think its called [email or web address]     2. Generalized anxiety disorder  Improved. Better on meds. Feels no need for therapy currently.    3. Need for hepatitis B screening test  Hep B # 2 today  - VACCINE ADMINISTRATION, INITIAL  - HEPATITIS B VACCINE, ADULT, IM    See Patient Instructions    Andria Pearce MD  [facility]

## 2018-08-13 NOTE — MR AVS SNAPSHOT
After Visit Summary   8/13/2018    Chelo Garcia    MRN: [id number]           Patient Information     Date Of Birth          1995        Visit Information        Provider Department      8/13/2018 8:00 AM Andria Pearce MD [facility]        Today's Diagnoses     Severe episode of recurrent major depressive disorder, without psychotic features (H)    -  1    Generalized anxiety disorder        Need for hepatitis B screening test          Care Instructions    continue sertraline 100 mg daily   See you back in 2 montha or earlier  Another good website to check out is Dr kylee barraza's website I think its called [email or web address]           Follow-ups after your visit        Who to contact     If you have questions or need follow up information about today's clinic visit or your schedule please contact [facility] [place] 2[facility] directly at [phone].  Normal or non-critical lab and imaging results will be communicated to you by MyChart, letter or phone within 4 business days after the clinic has received the results. If you do not hear from us within 7 days, please contact the clinic through Exam18hart or phone. If you have a critical or abnormal lab result, we will notify you by phone as soon as possible.  Submit refill requests through Geeksphone or call your pharmacy and they will forward the refill request to us. Please allow 3 business days for your refill to be completed.          Additional Information About Your Visit        Exam18hart Information     Geeksphone gives you secure access to your electronic health record. If you see a primary care provider, you can also send messages to your care team and make appointments. If you have questions, please call your primary care clinic.  If you do not have a primary care provider, please call [phone] and they will assist you.        Care EveryWhere ID     This is your Care EveryWhere ID. This could be used by other organizations to access your  [place] medical records  [id number]        Your Vitals Were     Pulse Temperature Respirations Last Period Pulse Oximetry BMI (Body Mass Index)    74 98.8  F (37.1  C) (Oral) 16 07/29/2018 (LMP Unknown) 98% 30.47 kg/m2       Blood Pressure from Last 3 Encounters:   08/13/18 118/78   07/09/18 118/82   06/15/18 120/80    Weight from Last 3 Encounters:   08/13/18 177 lb 8 oz (80.5 kg)   07/09/18 174 lb (78.9 kg)   06/15/18 179 lb 8 oz (81.4 kg)              We Performed the Following     HEPATITIS B VACCINE, ADULT, IM     VACCINE ADMINISTRATION, INITIAL        Primary Care Provider Office Phone # Fax #    Andria Pearce [phone] [address]  [facility] [address]        Equal Access to Services     [facility] : Hadii dionne todd Sorusty, waaxda lucecilioadaha, qaybta kaalmada fransisco, joey cuellar . So [facility] [phone].    ATENCIÓN: Si habla español, tiene a easley disposición servicios gratuitos de asistencia lingüística. Llame al [phone].    We comply with applicable federal civil rights laws and [place] laws. We do not discriminate on the basis of race, color, national origin, age, disability, sex, sexual orientation, or gender identity.            Thank you!     Thank you for choosing [facility]  for your care. Our goal is always to provide you with excellent care. Hearing back from our patients is one way we can continue to improve our services. Please take a few minutes to complete the written survey that you may receive in the mail after your visit with us. Thank you!             Your Updated Medication List - Protect others around you: Learn how to safely use, store and throw away your medicines at www.disposemymeds.org.          This list is accurate as of 8/13/18  8:03 AM.  Always use your most recent med list.                   Brand Name Dispense Instructions for use Diagnosis    hydrOXYzine 25 MG tablet    ATARAX    10 tablet     Take 1 tablet (25 mg) by mouth every 8 hours as needed for anxiety    Generalized anxiety disorder       norethindrone-ethinyl estradiol 1-20 MG-MCG per tablet    MICROGESTIN    84 tablet    Take 1 tablet by mouth daily    Encounter for contraceptive management, unspecified type       sertraline 100 MG tablet    ZOLOFT    30 tablet    Take 1 tablet (100 mg) by mouth daily    Severe episode of recurrent major depressive disorder, without psychotic features (H), Generalized anxiety disorder

## 2018-08-13 NOTE — PATIENT INSTRUCTIONS
continue sertraline 100 mg daily   See you back in 2 months or earlier  Another good website to check out is Dr kylee barraza's website I think its called [email or web address]

## 2018-08-13 NOTE — NURSING NOTE
Screening Questionnaire for Adult Immunization    Are you sick today?   No   Do you have allergies to medications, food, a vaccine component or latex?   No   Have you ever had a serious reaction after receiving a vaccination?   No   Do you have a long-term health problem with heart disease, lung disease, asthma, kidney disease, metabolic disease (e.g. diabetes), anemia, or other blood disorder?   No   Do you have cancer, leukemia, HIV/AIDS, or any other immune system problem?   No   In the past 3 months, have you taken medications that affect  your immune system, such as prednisone, other steroids, or anticancer drugs; drugs for the treatment of rheumatoid arthritis, Crohn s disease, or psoriasis; or have you had radiation treatments?   No   Have you had a seizure, or a brain or other nervous system problem?   No   During the past year, have you received a transfusion of blood or blood     products, or been given immune (gamma) globulin or antiviral drug?   No   For women: Are you pregnant or is there a chance you could become        pregnant during the next month?   No   Have you received any vaccinations in the past 4 weeks?   No     Immunization questionnaire answers were all negative.        Per orders of Andria Lopez, injection of hep B given by Kaylah Epstein. Patient instructed to remain in clinic for 15 minutes afterwards, and to report any adverse reaction to me immediately.       Screening performed by Kaylah Epstein on 8/13/2018 at 8:09 AM.

## 2018-08-14 ASSESSMENT — PATIENT HEALTH QUESTIONNAIRE - PHQ9: SUM OF ALL RESPONSES TO PHQ QUESTIONS 1-9: 5

## 2018-08-14 ASSESSMENT — ANXIETY QUESTIONNAIRES: GAD7 TOTAL SCORE: 0

## 2018-09-19 ENCOUNTER — MYC MEDICAL ADVICE (OUTPATIENT)
Dept: FAMILY MEDICINE | Facility: CLINIC | Age: 23
End: 2018-09-19

## 2018-09-20 ENCOUNTER — OFFICE VISIT (OUTPATIENT)
Dept: FAMILY MEDICINE | Facility: CLINIC | Age: 23
End: 2018-09-20
Payer: COMMERCIAL

## 2018-09-20 VITALS
RESPIRATION RATE: 14 BRPM | HEART RATE: 67 BPM | SYSTOLIC BLOOD PRESSURE: 116 MMHG | DIASTOLIC BLOOD PRESSURE: 79 MMHG | OXYGEN SATURATION: 97 % | WEIGHT: 176 LBS | BODY MASS INDEX: 30.21 KG/M2 | TEMPERATURE: 98.2 F

## 2018-09-20 DIAGNOSIS — F51.04 PSYCHOPHYSIOLOGICAL INSOMNIA: ICD-10-CM

## 2018-09-20 DIAGNOSIS — Z23 NEED FOR PROPHYLACTIC VACCINATION AND INOCULATION AGAINST INFLUENZA: ICD-10-CM

## 2018-09-20 DIAGNOSIS — F33.2 SEVERE EPISODE OF RECURRENT MAJOR DEPRESSIVE DISORDER, WITHOUT PSYCHOTIC FEATURES (H): Primary | ICD-10-CM

## 2018-09-20 DIAGNOSIS — F41.1 GENERALIZED ANXIETY DISORDER: ICD-10-CM

## 2018-09-20 PROCEDURE — 90471 IMMUNIZATION ADMIN: CPT | Performed by: FAMILY MEDICINE

## 2018-09-20 PROCEDURE — 90686 IIV4 VACC NO PRSV 0.5 ML IM: CPT | Performed by: FAMILY MEDICINE

## 2018-09-20 PROCEDURE — 99214 OFFICE O/P EST MOD 30 MIN: CPT | Mod: 25 | Performed by: FAMILY MEDICINE

## 2018-09-20 RX ORDER — SERTRALINE HYDROCHLORIDE 100 MG/1
150 TABLET, FILM COATED ORAL DAILY
Qty: 45 TABLET | Refills: 3 | Status: SHIPPED | OUTPATIENT
Start: 2018-09-20 | End: 2019-02-01

## 2018-09-20 RX ORDER — HYDROXYZINE HYDROCHLORIDE 25 MG/1
25 TABLET, FILM COATED ORAL EVERY 8 HOURS PRN
Qty: 20 TABLET | Refills: 0 | Status: SHIPPED | OUTPATIENT
Start: 2018-09-20 | End: 2019-03-03

## 2018-09-20 RX ORDER — TRAZODONE HYDROCHLORIDE 50 MG/1
50 TABLET, FILM COATED ORAL
Qty: 30 TABLET | Refills: 1 | Status: SHIPPED | OUTPATIENT
Start: 2018-09-20 | End: 2019-03-03

## 2018-09-20 ASSESSMENT — ANXIETY QUESTIONNAIRES
7. FEELING AFRAID AS IF SOMETHING AWFUL MIGHT HAPPEN: NOT AT ALL
5. BEING SO RESTLESS THAT IT IS HARD TO SIT STILL: NEARLY EVERY DAY
GAD7 TOTAL SCORE: 15
1. FEELING NERVOUS, ANXIOUS, OR ON EDGE: NEARLY EVERY DAY
2. NOT BEING ABLE TO STOP OR CONTROL WORRYING: MORE THAN HALF THE DAYS
IF YOU CHECKED OFF ANY PROBLEMS ON THIS QUESTIONNAIRE, HOW DIFFICULT HAVE THESE PROBLEMS MADE IT FOR YOU TO DO YOUR WORK, TAKE CARE OF THINGS AT HOME, OR GET ALONG WITH OTHER PEOPLE: VERY DIFFICULT
6. BECOMING EASILY ANNOYED OR IRRITABLE: NEARLY EVERY DAY
3. WORRYING TOO MUCH ABOUT DIFFERENT THINGS: MORE THAN HALF THE DAYS

## 2018-09-20 ASSESSMENT — PATIENT HEALTH QUESTIONNAIRE - PHQ9: 5. POOR APPETITE OR OVEREATING: MORE THAN HALF THE DAYS

## 2018-09-20 NOTE — PATIENT INSTRUCTIONS
Increase sertraline to 150 mg daily   Start trial of trazodone 50 mg 1/2 to 1 tab at bedtime as needed for sleep  Use atrax as needed for nayla only   Refills sent  Can consider propranolol for performance anxiety in the future  See the Counsellor again  Referral resent  Consider psychiatrist in the future  See you back in 2 to 3 weeks  Flu shot given   Hep B # 3 in dec

## 2018-09-20 NOTE — MR AVS SNAPSHOT
After Visit Summary   9/20/2018    Chelo Garcia    MRN: [id number]           Patient Information     Date Of Birth          1995        Visit Information        Provider Department      9/20/2018 3:00 PM Andria Pearce MD [facility]        Today's Diagnoses     Severe episode of recurrent major depressive disorder, without psychotic features (H)    -  1    Generalized anxiety disorder        Psychophysiological insomnia        Need for prophylactic vaccination and inoculation against influenza          Care Instructions    Increase sertraline to 150 mg daily   Start trial of trazodone 50 mg 1/2 to 1 tab at bedtime as needed for sleep  Use atrax as needed for nayla only   Refills sent  Can consider propranolol for performance anxiety in the future  See the Counsellor again  Referral resent  Consider psychiatrist in the future  See you back in 2 to 3 weeks  Flu shot given   Hep B # 3 in dec               Follow-ups after your visit        Additional Services     MENTAL HEALTH REFERRAL  - Adult; Outpatient Treatment; Individual/Couples/Family/Group Therapy/Health Psychology; [facility]: [facility] [phone]; We will contact you to schedule the appointment or please call with any questions       All scheduling is subject to the client's specific insurance plan & benefits, provider/location availability, and provider clinical specialities.  Please arrive 15 minutes early for your first appointment and bring your completed paperwork.    Please be aware that coverage of these services is subject to the terms and limitations of your health insurance plan.  Call member services at your health plan with any benefit or coverage questions.                            Follow-up notes from your care team     Return in about 2 weeks (around 10/4/2018).      Your next 10 appointments already scheduled     Oct 02, 2018  8:00 AM CDT   SHORT with Andria Pearce MD   [facility]  [place] ([facility])    [address] [place] [id number] [phone]            Oct 12, 2018  1:00 PM CDT   SHORT with And[facility] MD   [facility]    [address] [place] [id number] [phone]              Who to contact     If you have questions or need follow up information about today's clinic visit or your schedule please contact [facility] directly at [phone].  Normal or non-critical lab and imaging results will be communicated to you by Balayahart, letter or phone within 4 business days after the clinic has received the results. If you do not hear from us within 7 days, please contact the clinic through MTailort or phone. If you have a critical or abnormal lab result, we will notify you by phone as soon as possible.  Submit refill requests through GeeYuu or call your pharmacy and they will forward the refill request to us. Please allow 3 business days for your refill to be completed.          Additional Information About Your Visit        MyChart Information     GeeYuu gives you secure access to your electronic health record. If you see a primary care provider, you can also send messages to your care team and make appointments. If you have questions, please call your primary care clinic.  If you do not have a primary care provider, please call [phone] and they will assist you.        Care EveryWhere ID     This is your Care EveryWhere ID. This could be used by other organizations to access your [place] medical records  [id number]        Your Vitals Were     Pulse Temperature Respirations Pulse Oximetry BMI (Body Mass Index)       67 98.2  F (36.8  C) (Oral) 14 97% 30.21 kg/m2        Blood Pressure from Last 3 Encounters:   09/20/18 116/79   08/13/18 118/78   07/09/18 118/82    Weight from Last 3 Encounters:   09/20/18 176 lb (79.8 kg)   08/13/18 177 lb 8 oz (80.5 kg)   07/09/18 174 lb  (78.9 kg)              We Performed the Following     HC FLU VAC PRESRV FREE QUAD SPLIT VIR 3+YRS IM     MENTAL HEALTH REFERRAL  - Adult; Outpatient Treatment; Individual/Couples/Family/Group Therapy/Health Psychology; [facility]: [facility] [phone]; We will contact you to schedule the appointment or please call with any questions     Vaccine Administration, Initial [[id number]]     VACCINE ADMINISTRATION, INITIAL          Today's Medication Changes          These changes are accurate as of 9/20/18  3:24 PM.  If you have any questions, ask your nurse or doctor.               Start taking these medicines.        Dose/Directions    traZODone 50 MG tablet   Commonly known as:  DESYREL   Used for:  Psychophysiological insomnia   Started by:  Andria Pearce MD        Dose:  50 mg   Take 1 tablet (50 mg) by mouth nightly as needed for sleep   Quantity:  30 tablet   Refills:  1         These medicines have changed or have updated prescriptions.        Dose/Directions    sertraline 100 MG tablet   Commonly known as:  ZOLOFT   This may have changed:  how much to take   Used for:  Severe episode of recurrent major depressive disorder, without psychotic features (H), Generalized anxiety disorder   Changed by:  Andria Pearce MD        Dose:  150 mg   Take 1.5 tablets (150 mg) by mouth daily   Quantity:  45 tablet   Refills:  3            Where to get your medicines      [facility] [address] [place] [address] AT [facility] & [address] [place] [id number]    Hours:  24-hours Phone:  [phone]     hydrOXYzine 25 MG tablet    sertraline 100 MG tablet    traZODone 50 MG tablet                Primary Care Provider Office Phone # Fax #    Andria Pearce [phone] [address] [place] [address]        Equal Access to Services     CYNDIE PETERSEN AH: raman Stovall qaybta  joey santizoandrea cuellar ah. [facility] [phone].    ATENCIÓN: Si zac snow, tiene a easley disposición servicios gratuitos de asistencia lingüística. Adam al [phone].    We comply with applicable federal civil rights laws and [place] laws. We do not discriminate on the basis of race, color, national origin, age, disability, sex, sexual orientation, or gender identity.            Thank you!     Thank you for choosing [facility]  for your care. Our goal is always to provide you with excellent care. Hearing back from our patients is one way we can continue to improve our services. Please take a few minutes to complete the written survey that you may receive in the mail after your visit with us. Thank you!             Your Updated Medication List - Protect others around you: Learn how to safely use, store and throw away your medicines at www.disposemymeds.org.          This list is accurate as of 9/20/18  3:24 PM.  Always use your most recent med list.                   Brand Name Dispense Instructions for use Diagnosis    hydrOXYzine 25 MG tablet    ATARAX    20 tablet    Take 1 tablet (25 mg) by mouth every 8 hours as needed for anxiety    Generalized anxiety disorder       norethindrone-ethinyl estradiol 1-20 MG-MCG per tablet    MICROGESTIN    84 tablet    Take 1 tablet by mouth daily    Encounter for contraceptive management, unspecified type       sertraline 100 MG tablet    ZOLOFT    45 tablet    Take 1.5 tablets (150 mg) by mouth daily    Severe episode of recurrent major depressive disorder, without psychotic features (H), Generalized anxiety disorder       traZODone 50 MG tablet    DESYREL    30 tablet    Take 1 tablet (50 mg) by mouth nightly as needed for sleep    Psychophysiological insomnia

## 2018-09-20 NOTE — PROGRESS NOTES
SUBJECTIVE:   Chelo Garcia is a 23 year old female who presents to clinic today for the following health issues:      Depression and Anxiety Follow-Up    Status since last visit: Worsened     Other associated symptoms:sleeping issues    Complicating factors:     Significant life event: Yes-  Stress at work     Current substance abuse: None    PHQ-9 6/15/2018 7/9/2018 8/13/2018   Total Score 12 10 5   Q9: Suicide Ideation Several days Not at all Not at all   F/U: Thoughts of suicide or self-harm Yes No -   F/U: Self harm-plan Yes No -   F/U: Self-harm action No No -   F/U: Safety concerns No No -     CHASE-7 SCORE 6/15/2018 7/9/2018 8/13/2018   Total Score 7 4 0     In the past two weeks have you had thoughts of suicide or self-harm?  Yes  In the past two weeks have you thought of a plan or intent to harm yourself? No.  Do you have concerns about your personal safety or the safety of others?   No  PHQ-9  English  PHQ-9   Any Language  CHASE-7  Suicide Assessment Five-step Evaluation and Treatment (SAFE-T)    Amount of exercise or physical activity: None    Problems taking medications regularly: No    Medication side effects: none    Diet: regular (no restrictions)    Hx of BMI 30, chronic mild low back pain intermittently, CHASE on atarax prn not used, MDD on sertraline, resolved proteinuria, on microgestin BCP, seen 1/29/18 for a physical. At that time declined meds, to speak to Konrad & referred for CBT.cbc, UA, CMP, TSH was normal. LDL was mildly elevated & wet prep showed BV & given metro gel. Std testing was all negative but not immune to hepatitis B. Advised revaccination but didn't come in. Called and given metronidazole & diflucan in feb & symptoms of BV finally resolved. Sen then by gyn 2/21 & put on BCP. 5/4 seen for external hemorrhoids that are not bothering her too much currently. 5/17 seen finally by psychology. Reports no alcohol or drug use. pap negative 2016 due in 2019,  Uses OTC sleeping aid  to  sleep, takes 2 pills every other day. nausea once the other day, may have been related to anxiety. Seen 6/1/18 started on sertraline , given atarax prn, continued on CBT and advised Vit D. Seen 6/15/18 Continued with chronic passive suicidal thoughts. No active plan. Not any worse. Has a Safety plan in place /crisis plan in place with therapist. Contracts to safety. Friends and sig other are supportive. Was  using Unisom about 2 pills a day. Denied use of alcohol or drugs when I saw her. Was seeing a therapist but due to expense was down to only once a month.  Given revaccination of Hep B as not immune. Increased sertraline to 75 mg daily. Seen 6/28 by Counsellor, reviewed safety plan, noted self cutting, unable to afford counseling, given resources for free counseling. Not looked into it yet.  seen 7/9 , got a cat , doing much better, sertraline increased to 100 mg.  seen mid august and was doing well and advised follow up in 2 months. Mn  negative   Sent a note yesterday stating  The last month and a half has been extremely hard on me. I've had a lot of stress at work and at home. My depression was very bad the last two weeks of August--I wanted to self harm and had bad thoughts. Thankfully I did not act on those. But because of this, I don't know if my dosage is enough? I feel my depression more after this stress and feel like I'm not getting or feeling better anymore. I also have been taking the anxiety pills you gave me. Some days I couldn't function at work without taking them. Anyway's, I'm not sure if I should set an appointment or not to increase my dosage     Here today for worsening depression. Noted it started 1 week after I saw her.  Work has been very stressful she had epic fundraising event and now its behind her so she feels relieved about that.but during that time was very stressed.  Has been having recent family issues.  Her boyfriend is still supportive.  A very close cousin tried to commit  suicide which was upsetting.  Grandfather is not doing well medically.  Few weeks ago she did have thoughts of harming herself and plan to cut herself but called her boyfriend immediately and she is no longer actively planning to harm herself. Has a safety plan in place and number to call for help. She still has chronic passive thoughts of harm but no active plan and has the number to call someone if she needs more help.  She is currently future oriented is interested again in seeing a counselor & would like a dose increase in her medication.  Otherwise feels well and is agreeable to a flu shot.    No fever or chills, no headache or dizziness, no double or blurry vision, no facial pain, earache, sore throat, runny nose, post nasal drip, no trouble hearing, smelling, tasting or swallowing, no cough , no chest pain, trouble breathing or palpitations, No abdominal pain, heart burn, reflux, nausea or vomiting or diarrhea or constipation, no blood in stools or black stools, no weight loss or night sweats. No dysuria, hematuria, frequency, urgency, hesitancy, incontinence, No pelvic complaints. No leg swelling or joint pain. No rash.    PHQ-9 ([employer]) 9/20/2018   1.  Little interest or pleasure in doing things 3   2.  Feeling down, depressed, or hopeless 3   3.  Trouble falling or staying asleep, or sleeping too much 3   4.  Feeling tired or having little energy 2   5.  Poor appetite or overeating 1   6.  Feeling bad about yourself 2   7.  Trouble concentrating 2   8.  Moving slowly or restless 0   9.  Suicidal or self-harm thoughts 3   PHQ-9 Total Score 19   Difficulty at work, home, or with people Very difficult   In the past two weeks have you had thoughts of suicide or self harm? Yes   Do you have concerns about your personal safety or the safety of others? No   In the past 2 weeks have you thought about a plan or had intention to harm yourself? Yes   In the past 2 weeks have you acted on these thoughts in any way? No    1.  Little interest or pleasure in doing things    2.  Feeling down, depressed, or hopeless    3.  Trouble falling or staying asleep, or sleeping too much    4.  Feeling tired or having little energy    5.  Poor appetite or overeating    6.  Feeling bad about yourself    7.  Trouble concentrating    8.  Moving slowly or restless    9.  Suicidal or self-harm thoughts    [facility] TOTAL SCORE----->    Difficulty at work, home, or with people    F/U: Thoughts of suicide or self harm?    F/U: Plan or intention for self harm?    F/U: Acted on thoughts?    F/U: Safety concerns for self or others?    CHASE-7   [employer], 2002; Used with Permission) 9/20/2018   1. Feeling nervous, anxious, or on edge 3   2. Not being able to stop or control worrying 2   3. Worrying too much about different things 2   4. Trouble relaxing 2   5. Being so restless that it is hard to sit still 3   6. Becoming easily annoyed or irritable 3   7. Feeling afraid, as if something awful might happen 0   CHASE-7 Total Score 15   If you checked any problems, how difficult have they made it for you to do your work, take care of things at home, or get along with other people? Very difficult     Problem list and histories reviewed & adjusted, as indicated.  Additional history: as documented    Patient Active Problem List   Diagnosis     Chronic midline low back pain without sciatica     BMI 30.0-30.9,adult     Major depressive disorder, recurrent episode, severe (H)     Generalized anxiety disorder     History reviewed. No pertinent surgical history.    Social History   Substance Use Topics     Smoking status: Never Smoker     Smokeless tobacco: Never Used     Alcohol use Yes      Comment: socially.     Family History   Problem Relation Age of Onset     Ovarian Cancer Mother      first had cervical caner , 2016 got ovarian caner at age 38      Cervical Cancer Mother      Breast Cancer Other          No Known Allergies  Recent Labs   Lab Test   01/29/18   1028   A1C  5.1   LDL  127*   HDL  68   TRIG  98   ALT  16   CR  0.75   GFRESTIMATED  >90   GFRESTBLACK  >90   POTASSIUM  4.1   TSH  0.96      BP Readings from Last 3 Encounters:   09/20/18 116/79   08/13/18 118/78   07/09/18 118/82    Wt Readings from Last 3 Encounters:   09/20/18 176 lb (79.8 kg)   08/13/18 177 lb 8 oz (80.5 kg)   07/09/18 174 lb (78.9 kg)                  Labs reviewed in EPIC    Reviewed and updated as needed this visit by clinical staff  Tobacco  Allergies  Meds  Med Hx  Surg Hx  Fam Hx  Soc Hx      Reviewed and updated as needed this visit by Provider         ROS:  Constitutional, HEENT, cardiovascular, pulmonary, GI, , musculoskeletal, neuro, skin, endocrine and psych systems are negative, except as otherwise noted.    OBJECTIVE:     /79  Pulse 67  Temp 98.2  F (36.8  C) (Oral)  Resp 14  Wt 176 lb (79.8 kg)  SpO2 97%  BMI 30.21 kg/m2  Body mass index is 30.21 kg/(m^2).  GENERAL: healthy, alert and no distress  EYES: Eyes grossly normal to inspection, PERRL and conjunctivae and sclerae normal  RESP: lungs clear to auscultation - no rales, rhonchi or wheezes  CV: regular rate and rhythm, normal S1 S2, no S3 or S4, no murmur, click or rub, no peripheral edema and peripheral pulses strong  ABDOMEN: soft, non tender  MS: no gross musculoskeletal defects noted, no edema  SKIN: no suspicious lesions or rashes  NEURO: Normal strength and tone, mentation intact and speech normal  PSYCH: mentation appears normal, affect normal/bright    Diagnostic Test Results:  No results found for this or any previous visit (from the past 24 hour(s)).    ASSESSMENT/PLAN:     1. Severe episode of recurrent major depressive disorder, without psychotic features (H)  Worsened. Had active plan few weeks ago not currently . Remains with chronic passive thoughts. Boyfriend is supportive and has numbers she can call if start to feel worse. And has a safety plan in place. Will increase  sertraline to 150 mg daily. Start trial of trazodone 50 mg 1/2 to 1 tab at bedtime as needed for sleep. Use atarax as needed for panic only. Refills sent  Can consider propranolol for performance anxiety in the future. See the Counsellor again. Referral resent. Consider psychiatrist in the future. See you back in 2 to 3 weeks or earlier if needed. Flu shot given. Hep B # 3 in dec   - MENTAL HEALTH REFERRAL  - Adult; Outpatient Treatment; Individual/Couples/Family/Group Therapy/Health Psychology; G: [facility] [phone]; We will contact you to schedule the appointment or please call with any questions  - sertraline (ZOLOFT) 100 MG tablet; Take 1.5 tablets (150 mg) by mouth daily  Dispense: 45 tablet; Refill: 3    2. Generalized anxiety disorder  - sertraline (ZOLOFT) 100 MG tablet; Take 1.5 tablets (150 mg) by mouth daily  Dispense: 45 tablet; Refill: 3  - hydrOXYzine (ATARAX) 25 MG tablet; Take 1 tablet (25 mg) by mouth every 8 hours as needed for anxiety  Dispense: 20 tablet; Refill: 0    3. Psychophysiological insomnia  - traZODone (DESYREL) 50 MG tablet; Take 1 tablet (50 mg) by mouth nightly as needed for sleep  Dispense: 30 tablet; Refill: 1    4. Need for prophylactic vaccination and inoculation against influenza  - Vaccine Administration, Initial [13341]  - VACCINE ADMINISTRATION, INITIAL  - HC FLU VAC PRESRV FREE QUAD SPLIT VIR 3+YRS IM    See Patient Instructions    Andria Pearce MD  [facility]    Injectable Influenza Immunization Documentation    1.  Is the person to be vaccinated sick today?   No    2. Does the person to be vaccinated have an allergy to a component   of the vaccine?   No  Egg Allergy Algorithm Link    3. Has the person to be vaccinated ever had a serious reaction   to influenza vaccine in the past?   No    4. Has the person to be vaccinated ever had Guillain-Barré syndrome?   No    Form completed by ..Luann STUART MA

## 2018-09-21 ASSESSMENT — ANXIETY QUESTIONNAIRES: GAD7 TOTAL SCORE: 15

## 2018-09-21 ASSESSMENT — PATIENT HEALTH QUESTIONNAIRE - PHQ9: SUM OF ALL RESPONSES TO PHQ QUESTIONS 1-9: 19

## 2018-10-02 ENCOUNTER — OFFICE VISIT (OUTPATIENT)
Dept: FAMILY MEDICINE | Facility: CLINIC | Age: 23
End: 2018-10-02
Payer: COMMERCIAL

## 2018-10-02 VITALS
BODY MASS INDEX: 30.81 KG/M2 | TEMPERATURE: 97.3 F | DIASTOLIC BLOOD PRESSURE: 74 MMHG | WEIGHT: 179.5 LBS | RESPIRATION RATE: 16 BRPM | SYSTOLIC BLOOD PRESSURE: 106 MMHG | OXYGEN SATURATION: 100 % | HEART RATE: 64 BPM

## 2018-10-02 DIAGNOSIS — F51.04 PSYCHOPHYSIOLOGICAL INSOMNIA: ICD-10-CM

## 2018-10-02 DIAGNOSIS — F33.2 SEVERE EPISODE OF RECURRENT MAJOR DEPRESSIVE DISORDER, WITHOUT PSYCHOTIC FEATURES (H): Primary | ICD-10-CM

## 2018-10-02 DIAGNOSIS — F41.1 GENERALIZED ANXIETY DISORDER: ICD-10-CM

## 2018-10-02 PROCEDURE — 99214 OFFICE O/P EST MOD 30 MIN: CPT | Performed by: FAMILY MEDICINE

## 2018-10-02 ASSESSMENT — PATIENT HEALTH QUESTIONNAIRE - PHQ9: 5. POOR APPETITE OR OVEREATING: SEVERAL DAYS

## 2018-10-02 ASSESSMENT — ANXIETY QUESTIONNAIRES
2. NOT BEING ABLE TO STOP OR CONTROL WORRYING: NOT AT ALL
1. FEELING NERVOUS, ANXIOUS, OR ON EDGE: SEVERAL DAYS
6. BECOMING EASILY ANNOYED OR IRRITABLE: MORE THAN HALF THE DAYS
GAD7 TOTAL SCORE: 5
5. BEING SO RESTLESS THAT IT IS HARD TO SIT STILL: NOT AT ALL
7. FEELING AFRAID AS IF SOMETHING AWFUL MIGHT HAPPEN: NOT AT ALL
IF YOU CHECKED OFF ANY PROBLEMS ON THIS QUESTIONNAIRE, HOW DIFFICULT HAVE THESE PROBLEMS MADE IT FOR YOU TO DO YOUR WORK, TAKE CARE OF THINGS AT HOME, OR GET ALONG WITH OTHER PEOPLE: SOMEWHAT DIFFICULT
3. WORRYING TOO MUCH ABOUT DIFFERENT THINGS: SEVERAL DAYS

## 2018-10-02 NOTE — PATIENT INSTRUCTIONS
Can consider propranolol for performance anxiety in the future.   See the Counsellor again.   Consider psychiatrist in the future.   See you back in 4 weeks or earlier if needed.   Hep B # 3 in dec

## 2018-10-02 NOTE — PROGRESS NOTES
SUBJECTIVE:   Chelo Garcia is a 23 year old female who presents to clinic today for the following health issues:      Depression and Anxiety Follow-Up    Status since last visit: No change to mild improvement     Other associated symptoms:None    Complicating factors:     Significant life event: No     Current substance abuse: Alcohol 1 glass a day.     PHQ-9 8/13/2018 9/20/2018 10/2/2018   Total Score 5 19 13   Q9: Suicide Ideation Not at all Nearly every day Several days   F/U: Thoughts of suicide or self-harm - Yes Yes   F/U: Self harm-plan - Yes No   F/U: Self-harm action - No No   F/U: Safety concerns - No No     CHASE-7 SCORE 8/13/2018 9/20/2018 10/2/2018   Total Score 0 15 5     In the past two weeks have you had thoughts of suicide or self-harm?  Yes  In the past two weeks have you thought of a plan or intent to harm yourself? No.  Do you have concerns about your personal safety or the safety of others?   No  PHQ-9  English  PHQ-9   Any Language  CHASE-7  Suicide Assessment Five-step Evaluation and Treatment (SAFE-T)    Amount of exercise or physical activity: 4-5 days/week for an average of walking for 1 hours    Problems taking medications regularly: No    Medication side effects: none    Diet: regular (no restrictions)    Hx of BMI 30, chronic mild low back pain intermittently, CHASE on atarax prn not used, MDD on sertraline, resolved proteinuria, on microgestin BCP, seen 1/29/18 for a physical. At that time declined meds, to speak to Konrad & referred for CBT.cbc, UA, CMP, TSH was normal. LDL was mildly elevated & wet prep showed BV & given metro gel. Std testing was all negative but not immune to hepatitis B. Advised revaccination but didn't come in. Called and given metronidazole & diflucan in feb & symptoms of BV finally resolved. Sen then by gyn 2/21 & put on BCP. 5/4 seen for external hemorrhoids that are not bothering her too much currently. 5/17 seen finally by psychology. Reports no alcohol or  drug use. pap negative 2016 due in 2019,  Uses OTC sleeping aid  to sleep, takes 2 pills every other day. nausea once the other day, may have been related to anxiety. Seen 6/1/18 started on sertraline , given atarax prn, continued on CBT and advised Vit D. Seen 6/15/18 Continued with chronic passive suicidal thoughts. No active plan. Not any worse. Has a Safety plan in place /crisis plan in place with therapist. Contracts to safety. Friends and sig other are supportive. Was  using Unisom about 2 pills a day. Denied use of alcohol or drugs when I saw her. Was seeing a therapist but due to expense was down to only once a month.  Given revaccination of Hep B as not immune. Increased sertraline to 75 mg daily. Seen 6/28 by Counsellor, reviewed safety plan, noted self cutting, unable to afford counseling, given resources for free counseling. Not looked into it yet.  seen 7/9 , got a cat , doing much better, sertraline increased to 100 mg.  Seen mid august and was doing well and advised follow up in 2 months. seen 9/20 for worsening depression and chronic passive suicidal thought and sertraline increased to 150, started on trazodone at bedtime and continued on atarax prn. Referred back to Counsellor and also to consider psyche. And given the flu shot. Mn  negative.    Chronic passive thoughts of death/ harm, no active plan, lives with roommate, boyfriend visits, has a cat, roommate has a dog. Working daily. Drinks 1 glass of wine a day with dinner  trazodone helping sleep through the night  On sertraline 150   Atarax only used once  Started vit d gummies  Mild constipation  No fever or chills, no headache or dizziness, no double or blurry vision, no facial pain, earache, sore throat, runny nose, post nasal drip, no trouble hearing, smelling, tasting or swallowing, no cough , no chest pain, trouble breathing or palpitations, No abdominal pain, heart burn, reflux, nausea or vomiting or diarrhea or constipation, no blood  in stools or black stools, no weight loss or night sweats. No dysuria, hematuria, frequency, urgency, hesitancy, incontinence, No pelvic complaints. No leg swelling or joint pain. No rash.    Problem list and histories reviewed & adjusted, as indicated.  Additional history: as documented    Patient Active Problem List   Diagnosis     Chronic midline low back pain without sciatica     BMI 30.0-30.9,adult     Major depressive disorder, recurrent episode, severe (H)     Generalized anxiety disorder     Psychophysiological insomnia     History reviewed. No pertinent surgical history.    Social History   Substance Use Topics     Smoking status: Never Smoker     Smokeless tobacco: Never Used     Alcohol use Yes      Comment: socially.     Family History   Problem Relation Age of Onset     Ovarian Cancer Mother      first had cervical caner , 2016 got ovarian caner at age 38      Cervical Cancer Mother      Breast Cancer Other          Current Outpatient Prescriptions   Medication Sig Dispense Refill     hydrOXYzine (ATARAX) 25 MG tablet Take 1 tablet (25 mg) by mouth every 8 hours as needed for anxiety 20 tablet 0     norethindrone-ethinyl estradiol (MICROGESTIN) 1-20 MG-MCG per tablet Take 1 tablet by mouth daily 84 tablet 3     sertraline (ZOLOFT) 100 MG tablet Take 1.5 tablets (150 mg) by mouth daily 45 tablet 3     traZODone (DESYREL) 50 MG tablet Take 1 tablet (50 mg) by mouth nightly as needed for sleep 30 tablet 1     No Known Allergies  Recent Labs   Lab Test  01/29/18   1028   A1C  5.1   LDL  127*   HDL  68   TRIG  98   ALT  16   CR  0.75   GFRESTIMATED  >90   GFRESTBLACK  >90   POTASSIUM  4.1   TSH  0.96      BP Readings from Last 3 Encounters:   10/02/18 106/74   09/20/18 116/79   08/13/18 118/78    Wt Readings from Last 3 Encounters:   10/02/18 179 lb 8 oz (81.4 kg)   09/20/18 176 lb (79.8 kg)   08/13/18 177 lb 8 oz (80.5 kg)                  Labs reviewed in EPIC    Reviewed and updated as needed this visit  by clinical staff  Tobacco  Allergies  Meds  Med Hx  Surg Hx  Fam Hx  Soc Hx      Reviewed and updated as needed this visit by Provider         ROS:  Constitutional, HEENT, cardiovascular, pulmonary, GI, , musculoskeletal, neuro, skin, endocrine and psych systems are negative, except as otherwise noted.    OBJECTIVE:     /74 (BP Location: Left arm, Patient Position: Chair, Cuff Size: Adult Regular)  Pulse 64  Temp 97.3  F (36.3  C) (Tympanic)  Resp 16  Wt 179 lb 8 oz (81.4 kg)  SpO2 100%  BMI 30.81 kg/m2  Body mass index is 30.81 kg/(m^2).  GENERAL: healthy, alert and no distress  EYES: Eyes grossly normal to inspection, PERRL and conjunctivae and sclerae normal  RESP: lungs clear to auscultation - no rales, rhonchi or wheezes  CV: regular rates and rhythm and normal S1 S2, no S3 or S4  MS: no gross musculoskeletal defects noted, no edema  SKIN: no suspicious lesions or rashes  NEURO: Normal strength and tone, mentation intact and speech normal  PSYCH: mentation appears normal, affect normal/bright, fatigued, judgement and insight intact and appearance well groomed    Diagnostic Test Results:  none     ASSESSMENT/PLAN:     1. Severe episode of recurrent major depressive disorder, without psychotic features (H)  Mild improvement. Continue sertraline 150 mg. Consider Wellbutrin in future. Cannot afford counseling or to see psyche. Can consider propranolol for performance anxiety in the future. See the Counsellor again in the future. Given [employer] online CBT self help website. Get a happy light to sit in front one hr a day.continue vitamin d daily. Consider psychiatrist in the future. See back in 4 weeks or earlier if needed. Hep B # 3 in dec. Follow up on oct 12th as planned.     2. Generalized anxiety disorder  Continue SSRI and atarax as needed    3. Psychophysiological insomnia  trazodone helping sleep well.      See Patient Instructions    Andria Pearce MD  [facility] [place]

## 2018-10-03 ASSESSMENT — PATIENT HEALTH QUESTIONNAIRE - PHQ9: SUM OF ALL RESPONSES TO PHQ QUESTIONS 1-9: 13

## 2018-10-03 ASSESSMENT — ANXIETY QUESTIONNAIRES: GAD7 TOTAL SCORE: 5

## 2018-10-24 ENCOUNTER — MYC MEDICAL ADVICE (OUTPATIENT)
Dept: FAMILY MEDICINE | Facility: CLINIC | Age: 23
End: 2018-10-24

## 2018-10-24 DIAGNOSIS — F33.2 SEVERE EPISODE OF RECURRENT MAJOR DEPRESSIVE DISORDER, WITHOUT PSYCHOTIC FEATURES (H): Primary | ICD-10-CM

## 2018-10-24 DIAGNOSIS — F41.1 GENERALIZED ANXIETY DISORDER: ICD-10-CM

## 2018-10-24 NOTE — TELEPHONE ENCOUNTER
Dr. Pearce-Please review.  Writer wanted to make you aware of patient's message.    Referral ordered.    Writer responded as per below.    ASHISH KelseyN, RN

## 2018-10-26 ENCOUNTER — TELEPHONE (OUTPATIENT)
Dept: FAMILY MEDICINE | Facility: CLINIC | Age: 23
End: 2018-10-26

## 2018-10-26 NOTE — TELEPHONE ENCOUNTER
----- Message from Christa Trent sent at 10/26/2018 12:28 PM CDT -----  Regarding: mental health order   Patient was contacted by [facility]. Patient was scheduled for medication management services with Gabriela Perez on 10.31.18 at 9am.      Christa Trent   , [facility]

## 2019-02-01 ENCOUNTER — OFFICE VISIT (OUTPATIENT)
Dept: FAMILY MEDICINE | Facility: CLINIC | Age: 24
End: 2019-02-01
Payer: COMMERCIAL

## 2019-02-01 VITALS
HEART RATE: 73 BPM | WEIGHT: 172 LBS | DIASTOLIC BLOOD PRESSURE: 77 MMHG | TEMPERATURE: 98.6 F | BODY MASS INDEX: 29.52 KG/M2 | SYSTOLIC BLOOD PRESSURE: 117 MMHG | OXYGEN SATURATION: 100 %

## 2019-02-01 DIAGNOSIS — F41.1 GENERALIZED ANXIETY DISORDER: ICD-10-CM

## 2019-02-01 DIAGNOSIS — Z11.59 NEED FOR HEPATITIS B SCREENING TEST: ICD-10-CM

## 2019-02-01 DIAGNOSIS — F51.04 PSYCHOPHYSIOLOGICAL INSOMNIA: ICD-10-CM

## 2019-02-01 DIAGNOSIS — F33.2 SEVERE EPISODE OF RECURRENT MAJOR DEPRESSIVE DISORDER, WITHOUT PSYCHOTIC FEATURES (H): Primary | ICD-10-CM

## 2019-02-01 PROCEDURE — 99214 OFFICE O/P EST MOD 30 MIN: CPT | Mod: 25 | Performed by: FAMILY MEDICINE

## 2019-02-01 PROCEDURE — 90471 IMMUNIZATION ADMIN: CPT | Performed by: FAMILY MEDICINE

## 2019-02-01 PROCEDURE — 90746 HEPB VACCINE 3 DOSE ADULT IM: CPT | Performed by: FAMILY MEDICINE

## 2019-02-01 RX ORDER — BUPROPION HYDROCHLORIDE 100 MG/1
100 TABLET, EXTENDED RELEASE ORAL DAILY
Qty: 30 TABLET | Refills: 1 | Status: SHIPPED | OUTPATIENT
Start: 2019-02-01 | End: 2019-04-01

## 2019-02-01 RX ORDER — SERTRALINE HYDROCHLORIDE 100 MG/1
150 TABLET, FILM COATED ORAL DAILY
Qty: 45 TABLET | Refills: 3 | Status: SHIPPED | OUTPATIENT
Start: 2019-02-01 | End: 2019-04-01

## 2019-02-01 ASSESSMENT — ANXIETY QUESTIONNAIRES
3. WORRYING TOO MUCH ABOUT DIFFERENT THINGS: MORE THAN HALF THE DAYS
IF YOU CHECKED OFF ANY PROBLEMS ON THIS QUESTIONNAIRE, HOW DIFFICULT HAVE THESE PROBLEMS MADE IT FOR YOU TO DO YOUR WORK, TAKE CARE OF THINGS AT HOME, OR GET ALONG WITH OTHER PEOPLE: VERY DIFFICULT
5. BEING SO RESTLESS THAT IT IS HARD TO SIT STILL: MORE THAN HALF THE DAYS
6. BECOMING EASILY ANNOYED OR IRRITABLE: SEVERAL DAYS
1. FEELING NERVOUS, ANXIOUS, OR ON EDGE: MORE THAN HALF THE DAYS
7. FEELING AFRAID AS IF SOMETHING AWFUL MIGHT HAPPEN: NOT AT ALL
2. NOT BEING ABLE TO STOP OR CONTROL WORRYING: MORE THAN HALF THE DAYS
GAD7 TOTAL SCORE: 11

## 2019-02-01 ASSESSMENT — PATIENT HEALTH QUESTIONNAIRE - PHQ9
SUM OF ALL RESPONSES TO PHQ QUESTIONS 1-9: 15
5. POOR APPETITE OR OVEREATING: MORE THAN HALF THE DAYS

## 2019-02-01 NOTE — PATIENT INSTRUCTIONS
Continue with online CBT through [employer]  Increase aerobic exercise  Decrease sertraline to 150 mg daily as 200 causing nausea  Start Wellbutrin 100 mg once in am   See you back in 3 weeks to adjust further  Atrax as needed  trazodone as needed  continue birth control  3rd Hep B today   Physical / pap with std screen in April   If nausea persist call us

## 2019-02-01 NOTE — LETTER
[facility]  [address] [facility] [address]  Phone: [phone]    February 1, 2019        Chelo Garcia  [address] [facility] [address]          To whom it may concern:    RE: Chelo Garcia    Patient was seen and treated today at our clinic and missed work.    Please contact me for questions or concerns.      Sincerely,        Andria Pearce MD

## 2019-02-01 NOTE — NURSING NOTE
Screening Questionnaire for Adult Immunization    Are you sick today?   No   Do you have allergies to medications, food, a vaccine component or latex?   No   Have you ever had a serious reaction after receiving a vaccination?   No   Do you have a long-term health problem with heart disease, lung disease, asthma, kidney disease, metabolic disease (e.g. diabetes), anemia, or other blood disorder?   No   Do you have cancer, leukemia, HIV/AIDS, or any other immune system problem?   No   In the past 3 months, have you taken medications that affect  your immune system, such as prednisone, other steroids, or anticancer drugs; drugs for the treatment of rheumatoid arthritis, Crohn s disease, or psoriasis; or have you had radiation treatments?   No   Have you had a seizure, or a brain or other nervous system problem?   No   During the past year, have you received a transfusion of blood or blood     products, or been given immune (gamma) globulin or antiviral drug?   No   For women: Are you pregnant or is there a chance you could become        pregnant during the next month?   No   Have you received any vaccinations in the past 4 weeks?   No     Immunization questionnaire answers were all negative.        Per orders of Andria Lopez, injection of  Hep B given by Kaylah Epstein. Patient instructed to remain in clinic for 15 minutes afterwards, and to report any adverse reaction to me immediately.       Screening performed by Kaylah Epstein on 2/1/2019 at 11:51 AM.

## 2019-02-01 NOTE — PROGRESS NOTES
SUBJECTIVE:   Chelo Garcia is a 24 year old female who presents to clinic today for the following health issues:    Depression and Anxiety Follow-Up    Status since last visit: Worsened since her last visit     Other associated symptoms:nausea, occasional diarrhea due to medication     Complicating factors:     Significant life event: No     Current substance abuse: None    PHQ 9/20/2018 10/2/2018 2/1/2019   PHQ-9 Total Score 19 13 15   Q9: Suicide Ideation Nearly every day Several days Several days   F/U: Thoughts of suicide or self-harm Yes Yes Yes   F/U: Self harm-plan Yes No No   F/U: Self-harm action No No No   F/U: Safety concerns No No No     CHASE-7 SCORE 9/20/2018 10/2/2018 2/1/2019   Total Score 15 5 11     In the past two weeks have you had thoughts of suicide or self-harm?  No.    Do you have concerns about your personal safety or the safety of others?   No  PHQ-9  English  PHQ-9   Any Language  CHASE-7  Suicide Assessment Five-step Evaluation and Treatment (SAFE-T)    Amount of exercise or physical activity: 1 day/week for an average of greater than 60 minutes    Problems taking medications regularly: Yes,  Has not taken medication in a week due to nausea     Medication side effects: nausea     Diet: regular (no restrictions)    Hx of BMI 30, chronic mild low back pain intermittently, CHASE on atarax prn not used, MDD on sertraline, resolved proteinuria, on microgestin BCP, seen 1/29/18 for a physical. At that time declined meds, to speak to Konrad & referred for CBT.cbc, UA, CMP, TSH was normal. LDL was mildly elevated & wet prep showed BV & given metro gel. Std testing was all negative but not immune to hepatitis B. Advised revaccination but didn't come in. Called and given metronidazole & diflucan in feb & symptoms of BV finally resolved. Sen then by gyn 2/21 & put on BCP. 5/4 seen for external hemorrhoids that are not bothering her too much currently. 5/17 seen finally by psychology. Reports no  alcohol or drug use. pap negative 2016 due in 2019,  Uses OTC sleeping aid  to sleep, takes 2 pills every other day. nausea once the other day, may have been related to anxiety. Seen 6/1/18 started on sertraline , given atarax prn, continued on CBT and advised Vit D. Seen 6/15/18 Continued with chronic passive suicidal thoughts. No active plan. Not any worse. Has a Safety plan in place /crisis plan in place with therapist. Contracts to safety. Friends and sig other are supportive. Was  using Unisom about 2 pills a day. Denied use of alcohol or drugs when I saw her. Was seeing a therapist but due to expense was down to only once a month.  Given revaccination of Hep B as not immune. Increased sertraline to 75 mg daily. Seen 6/28 by Counsellor, reviewed safety plan, noted self cutting, unable to afford counseling, given resources for free counseling. Not looked into it yet.  seen 7/9 , got a cat , doing much better, sertraline increased to 100 mg.  Seen mid august and was doing well and advised follow up in 2 months. seen 9/20 for worsening depression and chronic passive suicidal thought and sertraline increased to 150, started on trazodone at bedtime and continued on atarax prn. Referred back to Counsellor and also to consider psyche. And given the flu shot.   Seen 10/2/18 and noted mildly improved, advised a SAD lamp and continued same. Called few weeks later for psyche referral and given. Seen by Gabriela gonzalez 10/31/18. Notes not available. [place]  negative    Notes now since seen psyche, Sertraline increased to 200 mg. It really helped her mind but it made her v nauseous and have mild throw ups. Did not return to psyche. No script given. stopped sertraline due to nausea 1.5 week ago. Nausea stopped since meds discontinued. Mood was good till stopped meds hence score now high. Has passive thoughts of death. No active plan. Declines counseling. Doing CBT on line on [employer] [facility]& notes it is going well.  journaling too.     Taking atarax recently prn    On trazodone up to 3 days a week    Drinking less alcohol  Glass of wine at a meal 2 a week    No fever or chills, no headache or dizziness, no double or blurry vision, no facial pain, earache, sore throat, runny nose, post nasal drip, no trouble hearing, smelling, tasting or swallowing, no cough , no chest pain, trouble breathing or palpitations, No abdominal pain, heart burn, reflux, nausea or vomiting or diarrhea or constipation, no blood in stools or black stools, no night sweats. No dysuria, hematuria, frequency, urgency, hesitancy, incontinence, No pelvic complaints. No leg swelling or joint pain. No rash. Weight down 5 lbs.     Agreeable to third Hep B  Declines gc testing for now, will do at physical with pap due in April.     CHASE-7   [employer], 2002; Used with Permission) 2/1/2019   1. Feeling nervous, anxious, or on edge 2   2. Not being able to stop or control worrying 2   3. Worrying too much about different things 2   4. Trouble relaxing 2   5. Being so restless that it is hard to sit still 2   6. Becoming easily annoyed or irritable 1   7. Feeling afraid, as if something awful might happen 0   CHASE-7 Total Score 11   If you checked any problems, how difficult have they made it for you to do your work, take care of things at home, or get along with other people? Very difficult   PHQ-9 ([employer]) 2/1/2019   1.  Little interest or pleasure in doing things 3   2.  Feeling down, depressed, or hopeless 2   3.  Trouble falling or staying asleep, or sleeping too much 2   4.  Feeling tired or having little energy 3   5.  Poor appetite or overeating 1   6.  Feeling bad about yourself 2   7.  Trouble concentrating 1   8.  Moving slowly or restless 0   9.  Suicidal or self-harm thoughts 1   PHQ-9 Total Score 15   Difficulty at work, home, or with people Very difficult   In the past two weeks have you had thoughts of suicide or self harm? Yes   Do you have concerns  about your personal safety or the safety of others? No   In the past 2 weeks have you thought about a plan or had intention to harm yourself? No   In the past 2 weeks have you acted on these thoughts in any way? No   1.  Little interest or pleasure in doing things    2.  Feeling down, depressed, or hopeless    3.  Trouble falling or staying asleep, or sleeping too much    4.  Feeling tired or having little energy    5.  Poor appetite or overeating    6.  Feeling bad about yourself    7.  Trouble concentrating    8.  Moving slowly or restless    9.  Suicidal or self-harm thoughts    [facility] TOTAL SCORE----->    Difficulty at work, home, or with people    F/U: Thoughts of suicide or self harm?    F/U: Plan or intention for self harm?    F/U: Acted on thoughts?    F/U: Safety concerns for self or others?      Problem list and histories reviewed & adjusted, as indicated.     Additional history: as documented    Patient Active Problem List   Diagnosis     Chronic midline low back pain without sciatica     BMI 30.0-30.9,adult     Major depressive disorder, recurrent episode, severe (H)     Generalized anxiety disorder     Psychophysiological insomnia     History reviewed. No pertinent surgical history.    Social History     Tobacco Use     Smoking status: Never Smoker     Smokeless tobacco: Never Used   Substance Use Topics     Alcohol use: Yes     Comment: socially.     Family History   Problem Relation Age of Onset     Ovarian Cancer Mother         first had cervical caner , 2016 got ovarian caner at age 38      Cervical Cancer Mother      Breast Cancer Other          Current Outpatient Medications   Medication Sig Dispense Refill     buPROPion (WELLBUTRIN SR) 100 MG 12 hr tablet Take 1 tablet (100 mg) by mouth daily 30 tablet 1     hydrOXYzine (ATARAX) 25 MG tablet Take 1 tablet (25 mg) by mouth every 8 hours as needed for anxiety 20 tablet 0     norethindrone-ethinyl estradiol (MICROGESTIN) 1-20 MG-MCG per  tablet Take 1 tablet by mouth daily 84 tablet 3     sertraline (ZOLOFT) 100 MG tablet Take 1.5 tablets (150 mg) by mouth daily 45 tablet 3     traZODone (DESYREL) 50 MG tablet Take 1 tablet (50 mg) by mouth nightly as needed for sleep 30 tablet 1     No Known Allergies  Recent Labs   Lab Test 01/29/18  1028   A1C 5.1   *   HDL 68   TRIG 98   ALT 16   CR 0.75   GFRESTIMATED >90   GFRESTBLACK >90   POTASSIUM 4.1   TSH 0.96      BP Readings from Last 3 Encounters:   02/01/19 117/77   10/02/18 106/74   09/20/18 116/79    Wt Readings from Last 3 Encounters:   02/01/19 78 kg (172 lb)   10/02/18 81.4 kg (179 lb 8 oz)   09/20/18 79.8 kg (176 lb)                  Labs reviewed in EPIC    Reviewed and updated as needed this visit by clinical staff  Allergies  Meds       Reviewed and updated as needed this visit by Provider         ROS:  Constitutional, HEENT, cardiovascular, pulmonary, GI, , musculoskeletal, neuro, skin, endocrine and psych systems are negative, except as otherwise noted.    OBJECTIVE:     /77   Pulse 73   Temp 98.6  F (37  C) (Oral)   Wt 78 kg (172 lb)   LMP 01/15/2019 (Exact Date)   SpO2 100%   Breastfeeding? No   BMI 29.52 kg/m    Body mass index is 29.52 kg/m .  GENERAL: healthy, alert and no distress  EYES: Eyes grossly normal to inspection, PERRL and conjunctivae and sclerae normal  HENT: ear canals and TM's normal, nose and mouth without ulcers or lesions  NECK: no adenopathy, no asymmetry, masses, or scars and thyroid normal to palpation  RESP: lungs clear to auscultation - no rales, rhonchi or wheezes  CV: regular rate and rhythm, normal S1 S2, no S3 or S4, no murmur, click or rub, no peripheral edema and peripheral pulses strong  ABDOMEN: soft, non tender, no hepatosplenomegaly, no masses and bowel sounds normal  MS: no gross musculoskeletal defects noted, no edema  SKIN: no suspicious lesions or rashes  NEURO: Normal strength and tone, mentation intact and speech  normal  PSYCH: mentation appears normal, affect normal/bright    Diagnostic Test Results:  No results found for this or any previous visit (from the past 24 hour(s)).    ASSESSMENT/PLAN:   BMI 29, MDD on sertraline, CHASE on atarax prn, psychophysiologic insomnia on trazodone prn, hx of chronic low back pain,     1. Severe episode of recurrent major depressive disorder, without psychotic features (H)  Worse as off med 1.5 wk  Due to nausea. Would like to restart sertraline. Will put back at previous dose of 150 mg which she tolerated &add Wellbutrin to help with mood. Continue with online CBT through [employer]. Increase aerobic exercise. Decrease sertraline to 150 mg daily as 200 causing nausea. Start Wellbutrin 100 mg once in am. See back in 3 weeks to adjust further  Atarax as needed. Trazodone as needed. Continue birth control. 3rd Hep B today. Physical / pap with std screen in April. If nausea persist call us  - sertraline (ZOLOFT) 100 MG tablet; Take 1.5 tablets (150 mg) by mouth daily  Dispense: 45 tablet; Refill: 3  - buPROPion (WELLBUTRIN SR) 100 MG 12 hr tablet; Take 1 tablet (100 mg) by mouth daily  Dispense: 30 tablet; Refill: 1    2. Generalized anxiety disorder  - sertraline (ZOLOFT) 100 MG tablet; Take 1.5 tablets (150 mg) by mouth daily  Dispense: 45 tablet; Refill: 3  - buPROPion (WELLBUTRIN SR) 100 MG 12 hr tablet; Take 1 tablet (100 mg) by mouth daily  Dispense: 30 tablet; Refill: 1    3. Psychophysiological insomnia  Uses trazodone prn    4. BMI 30.0-30.9,adult  Working on diet & lifestyle.     5. Need for hepatitis B screening test  revac #3 given   - VACCINE ADMINISTRATION, INITIAL  - HEPATITIS B VACCINE, ADULT, IM    6. Screen for STD (sexually transmitted disease)  Will wait to get at time of physical.     See Patient Instructions    Andria Pearce MD  [facility]

## 2019-02-02 ASSESSMENT — ANXIETY QUESTIONNAIRES: GAD7 TOTAL SCORE: 11

## 2019-02-11 ENCOUNTER — TELEPHONE (OUTPATIENT)
Dept: FAMILY MEDICINE | Facility: CLINIC | Age: 24
End: 2019-02-11

## 2019-02-14 ASSESSMENT — ANXIETY QUESTIONNAIRES
1. FEELING NERVOUS, ANXIOUS, OR ON EDGE: SEVERAL DAYS
6. BECOMING EASILY ANNOYED OR IRRITABLE: SEVERAL DAYS
7. FEELING AFRAID AS IF SOMETHING AWFUL MIGHT HAPPEN: NOT AT ALL
2. NOT BEING ABLE TO STOP OR CONTROL WORRYING: NEARLY EVERY DAY
IF YOU CHECKED OFF ANY PROBLEMS ON THIS QUESTIONNAIRE, HOW DIFFICULT HAVE THESE PROBLEMS MADE IT FOR YOU TO DO YOUR WORK, TAKE CARE OF THINGS AT HOME, OR GET ALONG WITH OTHER PEOPLE: SOMEWHAT DIFFICULT
GAD7 TOTAL SCORE: 9
5. BEING SO RESTLESS THAT IT IS HARD TO SIT STILL: SEVERAL DAYS
3. WORRYING TOO MUCH ABOUT DIFFERENT THINGS: NEARLY EVERY DAY

## 2019-02-14 ASSESSMENT — PATIENT HEALTH QUESTIONNAIRE - PHQ9
SUM OF ALL RESPONSES TO PHQ QUESTIONS 1-9: 11
5. POOR APPETITE OR OVEREATING: NOT AT ALL

## 2019-02-14 NOTE — TELEPHONE ENCOUNTER
I have attempted to contact this patient by phone with the following results: left message to return my call on answering machine.    1s attempt    Kaylha Epstein MA

## 2019-02-14 NOTE — TELEPHONE ENCOUNTER
Pt called back , PHQ9/ GAD7 was done over the phone.     PHQ-9 SCORE 10/2/2018 2/1/2019 2/14/2019   PHQ-9 Total Score MyChart - - -   PHQ-9 Total Score 13 15 11     CHASE-7 SCORE 10/2/2018 2/1/2019 2/14/2019   Total Score 5 11 9     Pt has to reschedule appt 2/22/2019.       PHQ9 was score 11  GAD7 was score 9    Kaylah Epstein MA

## 2019-02-15 ASSESSMENT — ANXIETY QUESTIONNAIRES: GAD7 TOTAL SCORE: 9

## 2019-03-03 DIAGNOSIS — F51.04 PSYCHOPHYSIOLOGICAL INSOMNIA: ICD-10-CM

## 2019-03-03 DIAGNOSIS — F41.1 GENERALIZED ANXIETY DISORDER: ICD-10-CM

## 2019-03-04 ENCOUNTER — MYC MEDICAL ADVICE (OUTPATIENT)
Dept: FAMILY MEDICINE | Facility: CLINIC | Age: 24
End: 2019-03-04

## 2019-03-04 NOTE — TELEPHONE ENCOUNTER
"Requested Prescriptions   Pending Prescriptions Disp Refills     traZODone (DESYREL) 50 MG tablet [Pharmacy Med Name: TRAZODONE 50MG TABLETS]  Last Written Prescription Date:  9/20/2018  Last Fill Quantity: 30 tablet,  # refills: 1   Last Office Visit: 2/1/2019   Future Office Visit:    Next 5 appointments (look out 90 days)    Apr 29, 2019  8:20 AM CDT  SHORT with Andria Pearce MD  [facility] [address] [place] [id number] [phone]        30 tablet 0     Sig: TAKE 1 TABLET(50 MG) BY MOUTH EVERY NIGHT AS NEEDED FOR SLEEP    Serotonin Modulators Passed - 3/3/2019  9:33 AM       Passed - Recent (12 mo) or future (30 days) visit within the authorizing provider's specialty    Patient had office visit in the last 12 months or has a visit in the next 30 days with authorizing provider or within the authorizing provider's specialty.  See \"Patient Info\" tab in inbasket, or \"Choose Columns\" in Meds & Orders section of the refill encounter.           Passed - Medication is active on med list       Passed - Patient is age 18 or older       Passed - No active pregnancy on record       Passed - No positive pregnancy test in past 12 months     _________________________________________________________________________________________________________________________       hydrOXYzine (ATARAX) 25 MG tablet [Pharmacy Med Name: HYDROXYZINE HCL 25MG TABS (WHITE)]  Last Written Prescription Date:  9/20/2018  Last Fill Quantity: 20 tablet,  # refills: 0   Last Office Visit: 2/1/2019  Future Office Visit:    Next 5 appointments (look out 90 days)    Apr 29, 2019  8:20 AM CDT  SHORT with [facility] MD  [facility] ([facility]) [address] [place] [id number] [phone]        20 tablet 0     Sig: TAKE 1 TABLET(25 MG) BY MOUTH EVERY 8 HOURS AS NEEDED FOR ANXIETY    Antihistamines Protocol Passed - 3/3/2019  9:33 AM       " "Passed - Recent (12 mo) or future (30 days) visit within the authorizing provider's specialty    Patient had office visit in the last 12 months or has a visit in the next 30 days with authorizing provider or within the authorizing provider's specialty.  See \"Patient Info\" tab in inbasket, or \"Choose Columns\" in Meds & Orders section of the refill encounter.           Passed - Patient is age 3 or older    Apply age and/or weight-based dosing for peds patients age 3 and older.    Forward request to provider for patients under the age of 3.         Passed - Medication is active on med list          "

## 2019-03-05 RX ORDER — HYDROXYZINE HYDROCHLORIDE 25 MG/1
TABLET, FILM COATED ORAL
Qty: 20 TABLET | Refills: 0 | Status: SHIPPED | OUTPATIENT
Start: 2019-03-05 | End: 2019-04-01

## 2019-03-05 RX ORDER — TRAZODONE HYDROCHLORIDE 50 MG/1
TABLET, FILM COATED ORAL
Qty: 30 TABLET | Refills: 0 | Status: SHIPPED | OUTPATIENT
Start: 2019-03-05 | End: 2019-04-01

## 2019-03-11 NOTE — TELEPHONE ENCOUNTER
Dr. Pearce-Please review patient's response and may close encounter.  Patient scheduled for office visit on 4/29/19.    Thank you!  ASHISH KelseyN, RN

## 2019-04-01 ENCOUNTER — OFFICE VISIT (OUTPATIENT)
Dept: FAMILY MEDICINE | Facility: CLINIC | Age: 24
End: 2019-04-01
Payer: COMMERCIAL

## 2019-04-01 VITALS
TEMPERATURE: 99 F | SYSTOLIC BLOOD PRESSURE: 144 MMHG | HEART RATE: 65 BPM | OXYGEN SATURATION: 99 % | WEIGHT: 170 LBS | HEIGHT: 64 IN | DIASTOLIC BLOOD PRESSURE: 66 MMHG | BODY MASS INDEX: 29.02 KG/M2 | RESPIRATION RATE: 14 BRPM

## 2019-04-01 DIAGNOSIS — F41.1 GENERALIZED ANXIETY DISORDER: ICD-10-CM

## 2019-04-01 DIAGNOSIS — F33.2 SEVERE EPISODE OF RECURRENT MAJOR DEPRESSIVE DISORDER, WITHOUT PSYCHOTIC FEATURES (H): Primary | ICD-10-CM

## 2019-04-01 DIAGNOSIS — F51.04 PSYCHOPHYSIOLOGICAL INSOMNIA: ICD-10-CM

## 2019-04-01 PROCEDURE — 99214 OFFICE O/P EST MOD 30 MIN: CPT | Performed by: FAMILY MEDICINE

## 2019-04-01 RX ORDER — TRAZODONE HYDROCHLORIDE 50 MG/1
50 TABLET, FILM COATED ORAL AT BEDTIME
Qty: 30 TABLET | Refills: 3 | Status: SHIPPED | OUTPATIENT
Start: 2019-04-01 | End: 2019-04-18

## 2019-04-01 RX ORDER — BUPROPION HYDROCHLORIDE 100 MG/1
200 TABLET, EXTENDED RELEASE ORAL DAILY
Qty: 60 TABLET | Refills: 1 | Status: SHIPPED | OUTPATIENT
Start: 2019-04-01 | End: 2019-04-18

## 2019-04-01 RX ORDER — SERTRALINE HYDROCHLORIDE 100 MG/1
150 TABLET, FILM COATED ORAL DAILY
Qty: 45 TABLET | Refills: 3 | Status: SHIPPED | OUTPATIENT
Start: 2019-04-01 | End: 2019-04-18

## 2019-04-01 RX ORDER — HYDROXYZINE HYDROCHLORIDE 25 MG/1
25 TABLET, FILM COATED ORAL EVERY 8 HOURS PRN
Qty: 45 TABLET | Refills: 0 | Status: SHIPPED | OUTPATIENT
Start: 2019-04-01 | End: 2019-07-03

## 2019-04-01 ASSESSMENT — ANXIETY QUESTIONNAIRES
GAD7 TOTAL SCORE: 13
1. FEELING NERVOUS, ANXIOUS, OR ON EDGE: MORE THAN HALF THE DAYS
7. FEELING AFRAID AS IF SOMETHING AWFUL MIGHT HAPPEN: NOT AT ALL
6. BECOMING EASILY ANNOYED OR IRRITABLE: MORE THAN HALF THE DAYS
3. WORRYING TOO MUCH ABOUT DIFFERENT THINGS: NEARLY EVERY DAY
IF YOU CHECKED OFF ANY PROBLEMS ON THIS QUESTIONNAIRE, HOW DIFFICULT HAVE THESE PROBLEMS MADE IT FOR YOU TO DO YOUR WORK, TAKE CARE OF THINGS AT HOME, OR GET ALONG WITH OTHER PEOPLE: VERY DIFFICULT
2. NOT BEING ABLE TO STOP OR CONTROL WORRYING: MORE THAN HALF THE DAYS
5. BEING SO RESTLESS THAT IT IS HARD TO SIT STILL: MORE THAN HALF THE DAYS

## 2019-04-01 ASSESSMENT — PATIENT HEALTH QUESTIONNAIRE - PHQ9
SUM OF ALL RESPONSES TO PHQ QUESTIONS 1-9: 11
5. POOR APPETITE OR OVEREATING: MORE THAN HALF THE DAYS

## 2019-04-01 ASSESSMENT — MIFFLIN-ST. JEOR: SCORE: 1506.11

## 2019-04-01 NOTE — PATIENT INSTRUCTIONS
Continue sertraline 150 mg daily   Atrax 25 mg every 8 hrs as needed for anxiety, wean off as anxiety improves  Increase Wellbutrin to 200 mg daily   Continue trazodone 50 mg bedtime  Continue online counseling  Go to the ER if worse  Safety pan in place, roommate has a copy  See you back on 4/18 as planned for physical , pap , std screen , preventive care and recheck

## 2019-04-01 NOTE — PROGRESS NOTES
SUBJECTIVE:   Chelo Garica is a 24 year old female who presents to clinic today for the following health issues:    Depression and Anxiety Follow-Up    Status since last visit: Worsened     Other associated symptoms:None    Complicating factors:     Significant life event: Yes- family     Current substance abuse: None    PHQ 2/1/2019 2/14/2019 4/1/2019   PHQ-9 Total Score 15 11 11   Q9: Suicide Ideation Several days Not at all Several days   F/U: Thoughts of suicide or self-harm Yes - Yes   F/U: Self harm-plan No - No   F/U: Self-harm action No - No   F/U: Safety concerns No - No     CHASE-7 SCORE 2/1/2019 2/14/2019 4/1/2019   Total Score 11 9 13     In the past two weeks have you had thoughts of suicide or self-harm?  Yes  In the past two weeks have you thought of a plan or intent to harm yourself? No.  Do you have concerns about your personal safety or the safety of others?   No  PHQ-9  English  PHQ-9   Any Language  CHASE-7  Suicide Assessment Five-step Evaluation and Treatment (SAFE-T)    Amount of exercise or physical activity: None    Problems taking medications regularly: No    Medication side effects: pt is concern about night sweats    Diet: regular (no restrictions)      Hx of BMI 30, chronic mild low back pain intermittently, CHASE on atarax prn not used, MDD on sertraline, resolved proteinuria, on microgestin BCP, seen 1/29/18 for a physical. At that time declined meds, to speak to Konrad & referred for CBT.cbc, UA, CMP, TSH was normal. LDL was mildly elevated & wet prep showed BV & given metro gel. Std testing was all negative but not immune to hepatitis B. Advised revaccination but didn't come in. Called and given metronidazole & diflucan in feb & symptoms of BV finally resolved. Sen then by gyn 2/21 & put on BCP. 5/4 seen for external hemorrhoids that are not bothering her too much currently. 5/17 seen finally by psychology. Reports no alcohol or drug use. pap negative 2016 due in 2019,  Uses OTC  sleeping aid  to sleep, takes 2 pills every other day. nausea once the other day, may have been related to anxiety. Seen 6/1/18 started on sertraline , given atarax prn, continued on CBT and advised Vit D. Seen 6/15/18 Continued with chronic passive suicidal thoughts. No active plan. Not any worse. Has a Safety plan in place /crisis plan in place with therapist. Contracts to safety. Friends and sig other are supportive. Was  using Unisom about 2 pills a day. Denied use of alcohol or drugs when I saw her. Was seeing a therapist but due to expense was down to only once a month.  Given revaccination of Hep B as not immune. Increased sertraline to 75 mg daily. Seen 6/28 by Counsellor, reviewed safety plan, noted self cutting, unable to afford counseling, given resources for free counseling. Not looked into it yet.  seen 7/9 , got a cat , doing much better, sertraline increased to 100 mg.  Seen mid august and was doing well and advised follow up in 2 months. seen 9/20 for worsening depression and chronic passive suicidal thought and sertraline increased to 150, started on trazodone at bedtime and continued on atarax prn. Referred back to Counsellor and also to consider psyche. And given the flu shot. Seen 10/2/18 and noted mildly improved, advised a SAD lamp and continued same. Called few weeks later for psyche referral and given. Seen by Gabriela gonzalez 10/31/18. Notes not available. Sertraline increased to 200 mg by psyche. It really helped her mind but it made her v nauseous. Did not return to psyche. No script given.  Stopped sertraline due to nausea in jan 2019 Nausea stopped since meds discontinued. Mood was good till stopped meds then scores high.  Had passive thoughts of death. No active plan. Declined  counseling.  Was doing CBT on line on [email or web address] & noted it was going well, was journaling too.  Taking atarax recently prn. On trazodone up to 3 days a week. Drinking less alcohol a glass of wine at a meal  2 a week. Seen 2/1/19 with worsened mood as off med 1.5 wk ( had  stopped due to nausea form med). Noted chronic intermittent passive thought of suicide. No active plan, had a safety plan in place andcontracte dto safety. Resumed sertraline at previous dose of 150 mg which she tolerated in the past & added Wellbutrin to help with mood. Was to continue with online CBT through [employer]. Advised to Increase aerobic exercise. & return back in 3 weeks to adjust further. 3rd Hep B given. Mn  negative.    Not seen since then. Missed due to illness and then didn't feel like getting out of bed. Has chronic passive suicidal thoughts initially worse now better. Never acted on them and does not plan to too. Lives with room mate and cat obbishnu. Works one job. Close to maternal grandparents . Maternal grandfather not doing well due to chronic illness. Mother who she reports is white has bipolar, Has not had a good relationship with her, dad is from [facility], on parole for minor misdemeanor, & to get deported in next 40 days due to immigration issues, back to [facility]. Has a good friend and also roommate who are her supports. Has a safety plan in place. Does online counseling. Recently broke up with boyfriend in march 2019. Missed apt due to illness & second time couldn't get up out of bed. Feels med changes helped then life events occurred. Doing online counseling and journaling  2 panic attacks last three weeks and used atarax daily last 2 weeks. No active thoughts to harm self. Has a safety plan in place and room mate has a copy. Is speaking more frequently to her online therapist exploring deeper issues, which is helping and declines face to face counseling or psyche referral. Is in a way forcing herself to be with friends and going to the grocery store. Noted more night sweats started even before Wellbutrin added. Has been taking atarax every day last 2 weeks and sometime takes 2 of trazodone 50 mg for sleep as been hard  to sleep. Denies alcohol use recently or drugs and at most drinks caffeine 4 times a week, once a day. No hypomanic or manic symptoms ever.     Rest of her feels well and will be returning on 4/18 for recheck and physical    CHASE-7   [employer], 2002; Used with Permission) 4/1/2019   1. Feeling nervous, anxious, or on edge 2   2. Not being able to stop or control worrying 2   3. Worrying too much about different things 3   4. Trouble relaxing 2   5. Being so restless that it is hard to sit still 2   6. Becoming easily annoyed or irritable 2   7. Feeling afraid, as if something awful might happen 0   CHASE-7 Total Score 13   If you checked any problems, how difficult have they made it for you to do your work, take care of things at home, or get along with other people? Very difficult   PHQ-9 ([employer]) 4/1/2019   1.  Little interest or pleasure in doing things 1   2.  Feeling down, depressed, or hopeless 1   3.  Trouble falling or staying asleep, or sleeping too much 3   4.  Feeling tired or having little energy 1   5.  Poor appetite or overeating 2   6.  Feeling bad about yourself 1   7.  Trouble concentrating 1   8.  Moving slowly or restless 0   9.  Suicidal or self-harm thoughts 1   PHQ-9 Total Score 11   Difficulty at work, home, or with people Somewhat difficult   In the past two weeks have you had thoughts of suicide or self harm? Yes   Do you have concerns about your personal safety or the safety of others? No   In the past 2 weeks have you thought about a plan or had intention to harm yourself? No   In the past 2 weeks have you acted on these thoughts in any way? No   1.  Little interest or pleasure in doing things    2.  Feeling down, depressed, or hopeless    3.  Trouble falling or staying asleep, or sleeping too much    4.  Feeling tired or having little energy    5.  Poor appetite or overeating    6.  Feeling bad about yourself    7.  Trouble concentrating    8.  Moving slowly or restless    9.  Suicidal or  self-harm thoughts    PHQ-9 via My Chart TOTAL SCORE----->    Difficulty at work, home, or with people    F/U: Thoughts of suicide or self harm?    F/U: Plan or intention for self harm?    F/U: Acted on thoughts?    F/U: Safety concerns for self or others?      Problem list and histories reviewed & adjusted, as indicated.  Additional history: as documented    Patient Active Problem List   Diagnosis     Chronic midline low back pain without sciatica     BMI 30.0-30.9,adult     Major depressive disorder, recurrent episode, severe (H)     Generalized anxiety disorder     Psychophysiological insomnia     History reviewed. No pertinent surgical history.    Social History     Tobacco Use     Smoking status: Never Smoker     Smokeless tobacco: Former User   Substance Use Topics     Alcohol use: Yes     Comment: socially.     Family History   Problem Relation Age of Onset     Ovarian Cancer Mother         first had cervical caner , 2016 got ovarian caner at age 38      Cervical Cancer Mother      Mental Illness Mother      Bipolar Disorder Mother      Breast Cancer Other          Current Outpatient Medications   Medication Sig Dispense Refill     buPROPion (WELLBUTRIN SR) 100 MG 12 hr tablet Take 2 tablets (200 mg) by mouth daily 60 tablet 1     hydrOXYzine (ATARAX) 25 MG tablet Take 1 tablet (25 mg) by mouth every 8 hours as needed for anxiety 45 tablet 0     sertraline (ZOLOFT) 100 MG tablet Take 1.5 tablets (150 mg) by mouth daily 45 tablet 3     traZODone (DESYREL) 50 MG tablet Take 1 tablet (50 mg) by mouth At Bedtime 30 tablet 3     norethindrone-ethinyl estradiol (MICROGESTIN) 1-20 MG-MCG per tablet Take 1 tablet by mouth daily 84 tablet 3     No Known Allergies  Recent Labs   Lab Test 01/29/18  1028   A1C 5.1   *   HDL 68   TRIG 98   ALT 16   CR 0.75   GFRESTIMATED >90   GFRESTBLACK >90   POTASSIUM 4.1   TSH 0.96      BP Readings from Last 3 Encounters:   04/01/19 144/66   02/01/19 117/77   10/02/18 106/74     "Wt Readings from Last 3 Encounters:   04/01/19 77.1 kg (170 lb)   02/01/19 78 kg (172 lb)   10/02/18 81.4 kg (179 lb 8 oz)                  Labs reviewed in EPIC    Reviewed and updated as needed this visit by clinical staff  Tobacco  Allergies  Med Hx  Surg Hx  Fam Hx  Soc Hx      Reviewed and updated as needed this visit by Provider         ROS:  Constitutional, HEENT, cardiovascular, pulmonary, GI, , musculoskeletal, neuro, skin, endocrine and psych systems are negative, except as otherwise noted.    OBJECTIVE:     /66 (BP Location: Left arm, Patient Position: Chair, Cuff Size: Adult Large)   Pulse 65   Temp 99  F (37.2  C) (Oral)   Resp 14   Ht 1.626 m (5' 4\")   Wt 77.1 kg (170 lb)   LMP 03/04/2019 (Approximate)   SpO2 99%   BMI 29.18 kg/m    Body mass index is 29.18 kg/m .  GENERAL: healthy, alert, no distress and over weight  EYES: Eyes grossly normal to inspection, PERRL and conjunctivae and sclerae normal  RESP: lungs clear to auscultation - no rales, rhonchi or wheezes  CV: regular rate and rhythm, normal S1 S2, no S3 or S4, no murmur, click or rub, no peripheral edema and peripheral pulses strong  ABDOMEN: soft, nontender, no hepatosplenomegaly, no masses and bowel sounds normal  MS: no gross musculoskeletal defects noted, no edema  SKIN: no suspicious lesions or rashes  NEURO: Normal strength and tone, mentation intact and speech normal  PSYCH: mentation appears normal, affect normal/bright, affect flat, anxious, fatigued, judgement and insight intact and appearance well groomed    Diagnostic Test Results:  none     ASSESSMENT/PLAN:     1. Severe episode of recurrent major depressive disorder, without psychotic features (H)  Improved per report on med changes but then worsened due to uncontrollable life events. recognizes as such, has chronic intermittent passive suicidal thought not acte don them with no plan to and increase anxiety and panic attacks. Has a safety plan in place . Ah " good support and declines counseling face to face or psyche referral. Contracts to safety and part of safety plan in returning on 4/18 for recheck with physical. agreeable to med adjustment in Wellbutrin to see if will help. Continue sertraline 150 mg daily. Atarax 25 mg every 8 hrs as needed for anxiety, wean off as anxiety improves. Increase Wellbutrin to 200 mg daily. Continue trazodone 50 mg bedtime. Continue online counseling. Go to the ER if worse. Avoid alcohol. Safety pan in place, roommate has a copy. See back on 4/18 as planned for physical , pap , std screen , preventive care and recheck   - sertraline (ZOLOFT) 100 MG tablet; Take 1.5 tablets (150 mg) by mouth daily  Dispense: 45 tablet; Refill: 3  - buPROPion (WELLBUTRIN SR) 100 MG 12 hr tablet; Take 2 tablets (200 mg) by mouth daily  Dispense: 60 tablet; Refill: 1    2. Generalized anxiety disorder  - sertraline (ZOLOFT) 100 MG tablet; Take 1.5 tablets (150 mg) by mouth daily  Dispense: 45 tablet; Refill: 3  - hydrOXYzine (ATARAX) 25 MG tablet; Take 1 tablet (25 mg) by mouth every 8 hours as needed for anxiety  Dispense: 45 tablet; Refill: 0  - buPROPion (WELLBUTRIN SR) 100 MG 12 hr tablet; Take 2 tablets (200 mg) by mouth daily  Dispense: 60 tablet; Refill: 1    3. Psychophysiological insomnia  - traZODone (DESYREL) 50 MG tablet; Take 1 tablet (50 mg) by mouth At Bedtime  Dispense: 30 tablet; Refill: 3    Work on weight loss  Regular exercise  See Patient Instructions    Andria Pearce MD  [facility]

## 2019-04-02 ASSESSMENT — ANXIETY QUESTIONNAIRES: GAD7 TOTAL SCORE: 13

## 2019-04-08 NOTE — PROGRESS NOTES
Jarred Ms. Garcia,  Your results came back and are within acceptable limits. -Liver and gallbladder tests are normal. (ALT,AST, Alk phos, bilirubin), kidney function is normal (Cr, GFR), Sodium is normal, Potassium is normal, Calcium is normal, Glucose is normal (diabetes screening test).   -LDL(bad) cholesterol level is elevated,  A diet high in fat and simple carbohydrates, genetics and being overweight can contribute to this. ADVISE: Exercise, a low fat, low carbohydrate diet and weight control are helpful to improve this.  Rechecking your fasting cholesterol panel in 12 months is recommended (Lipid w/ LDL reflex, DX:hyperlipidemia)  -TSH (thyroid stimulating hormone) level is normal which indicates normal thyroid function.. If you have any further concerns please do not hesitate to contact us by message, phone or making an appointment.  Have a good day   Dr Ramses BARAHONA No

## 2019-04-15 NOTE — PROGRESS NOTES
"     SUBJECTIVE:   CC: Chelo Garcia is an 24 year old woman who presents for preventive health visit.     HPI  {Add if <65 person on Medicare  - Required Questions (Optional):265012}  {Outside tests to abstract? :079553}    {additional problems to add (Optional):623605}    Today's PHQ-2 Score:   PHQ-2 ( 1999 [employer]) 4/1/2019   Q1: Little interest or pleasure in doing things 1   Q2: Feeling down, depressed or hopeless 1   PHQ-2 Score 2   Q1: Little interest or pleasure in doing things -   Q2: Feeling down, depressed or hopeless -   PHQ-2 Score -       Abuse: Current or Past(Physical, Sexual or Emotional)- { :669881}  Do you feel safe in your environment? { :332550}    Social History     Tobacco Use     Smoking status: Never Smoker     Smokeless tobacco: Former User   Substance Use Topics     Alcohol use: Yes     Comment: socially.     {Rooming Staff- Complete this question if Prescreen response is not shown below for today's visit. If you drink alcohol do you typically have >3 drinks per day or >7 drinks per week? (Optional):481302}    No flowsheet data found.{add AUDIT responses (Optional) (A score of 7 for adult men is an indication of hazardous drinking; a score of 8 or more is an indication of an alcohol use disorder.  A score of 7 or more for adult women is an indication of hazardous drinking or an alchohol use disorder):512666}    Reviewed orders with patient.  Reviewed health maintenance and updated orders accordingly - { :842959::\"Yes\"}  {Chronicprobdata (Optional):499896}    {Mammo Decision Support (Optional):410395}    Pertinent mammograms are reviewed under the imaging tab.  History of abnormal Pap smear: { :476449}     Reviewed and updated as needed this visit by clinical staff         Reviewed and updated as needed this visit by Provider        {HISTORY OPTIONS (Optional):359966}    Review of Systems  {FEMALE ROS (Optional):781426}     OBJECTIVE:   There were no vitals taken for this " "visit.  Physical Exam  {Exam Choices (Optional):730572}    {Diagnostic Test Results (Optional):215756::\"Diagnostic Test Results:\",\"none \"}    ASSESSMENT/PLAN:   {Diag Picklist:501472}    COUNSELING:  {FEMALE COUNSELING MESSAGES:960179::\"Reviewed preventive health counseling, as reflected in patient instructions\"}    BP Readings from Last 1 Encounters:   04/01/19 144/66     Estimated body mass index is 29.18 kg/m  as calculated from the following:    Height as of 4/1/19: 1.626 m (5' 4\").    Weight as of 4/1/19: 77.1 kg (170 lb).    {BP Counseling- Complete if BP >= 120/80  (Optional):080440}  {Weight Management Plan (ACO) Complete if BMI is abnormal-  Ages 18-64  BMI >24.9.  Age 65+ with BMI <23 or >30 (Optional):097140}     reports that she has never smoked. She quit smokeless tobacco use about 6 months ago.  {Tobacco Cessation -- Complete if patient is a smoker (Optional):469549}    Counseling Resources:  ATP IV Guidelines  Pooled Cohorts Equation Calculator  Breast Cancer Risk Calculator  FRAX Risk Assessment  ICSI Preventive Guidelines  Dietary Guidelines for Americans, 2010  USDA's MyPlate  ASA Prophylaxis  Lung CA Screening    Andria Pearce MD  [facility]  "

## 2019-04-17 ASSESSMENT — ANXIETY QUESTIONNAIRES
2. NOT BEING ABLE TO STOP OR CONTROL WORRYING: SEVERAL DAYS
1. FEELING NERVOUS, ANXIOUS, OR ON EDGE: SEVERAL DAYS
5. BEING SO RESTLESS THAT IT IS HARD TO SIT STILL: SEVERAL DAYS
4. TROUBLE RELAXING: SEVERAL DAYS
GAD7 TOTAL SCORE: 5
6. BECOMING EASILY ANNOYED OR IRRITABLE: SEVERAL DAYS
GAD7 TOTAL SCORE: 5
GAD7 TOTAL SCORE: 5
7. FEELING AFRAID AS IF SOMETHING AWFUL MIGHT HAPPEN: NOT AT ALL
7. FEELING AFRAID AS IF SOMETHING AWFUL MIGHT HAPPEN: NOT AT ALL
3. WORRYING TOO MUCH ABOUT DIFFERENT THINGS: NOT AT ALL

## 2019-04-17 ASSESSMENT — PATIENT HEALTH QUESTIONNAIRE - PHQ9
10. IF YOU CHECKED OFF ANY PROBLEMS, HOW DIFFICULT HAVE THESE PROBLEMS MADE IT FOR YOU TO DO YOUR WORK, TAKE CARE OF THINGS AT HOME, OR GET ALONG WITH OTHER PEOPLE: SOMEWHAT DIFFICULT
SUM OF ALL RESPONSES TO PHQ QUESTIONS 1-9: 10
SUM OF ALL RESPONSES TO PHQ QUESTIONS 1-9: 10

## 2019-04-18 ENCOUNTER — OFFICE VISIT (OUTPATIENT)
Dept: FAMILY MEDICINE | Facility: CLINIC | Age: 24
End: 2019-04-18
Payer: COMMERCIAL

## 2019-04-18 VITALS
BODY MASS INDEX: 28.15 KG/M2 | HEART RATE: 82 BPM | OXYGEN SATURATION: 100 % | TEMPERATURE: 98.4 F | DIASTOLIC BLOOD PRESSURE: 74 MMHG | SYSTOLIC BLOOD PRESSURE: 120 MMHG | WEIGHT: 164 LBS | RESPIRATION RATE: 18 BRPM

## 2019-04-18 DIAGNOSIS — F51.04 PSYCHOPHYSIOLOGICAL INSOMNIA: ICD-10-CM

## 2019-04-18 DIAGNOSIS — Z01.419 WELL WOMAN EXAM WITH ROUTINE GYNECOLOGICAL EXAM: Primary | ICD-10-CM

## 2019-04-18 DIAGNOSIS — Z30.9 ENCOUNTER FOR CONTRACEPTIVE MANAGEMENT, UNSPECIFIED TYPE: ICD-10-CM

## 2019-04-18 DIAGNOSIS — F33.2 SEVERE EPISODE OF RECURRENT MAJOR DEPRESSIVE DISORDER, WITHOUT PSYCHOTIC FEATURES (H): ICD-10-CM

## 2019-04-18 DIAGNOSIS — R25.1 SHAKINESS: ICD-10-CM

## 2019-04-18 DIAGNOSIS — N76.0 BV (BACTERIAL VAGINOSIS): ICD-10-CM

## 2019-04-18 DIAGNOSIS — F41.1 GENERALIZED ANXIETY DISORDER: ICD-10-CM

## 2019-04-18 DIAGNOSIS — B96.89 BV (BACTERIAL VAGINOSIS): ICD-10-CM

## 2019-04-18 DIAGNOSIS — Z11.3 SCREEN FOR STD (SEXUALLY TRANSMITTED DISEASE): ICD-10-CM

## 2019-04-18 PROBLEM — M54.50 CHRONIC MIDLINE LOW BACK PAIN WITHOUT SCIATICA: Status: RESOLVED | Noted: 2017-07-24 | Resolved: 2019-04-18

## 2019-04-18 PROBLEM — G89.29 CHRONIC MIDLINE LOW BACK PAIN WITHOUT SCIATICA: Status: RESOLVED | Noted: 2017-07-24 | Resolved: 2019-04-18

## 2019-04-18 LAB
ALBUMIN SERPL-MCNC: 3.9 G/DL (ref 3.4–5)
ALP SERPL-CCNC: 86 U/L (ref 40–150)
ALT SERPL W P-5'-P-CCNC: 18 U/L (ref 0–50)
ANION GAP SERPL CALCULATED.3IONS-SCNC: 4 MMOL/L (ref 3–14)
AST SERPL W P-5'-P-CCNC: 11 U/L (ref 0–45)
BASOPHILS # BLD AUTO: 0 10E9/L (ref 0–0.2)
BASOPHILS NFR BLD AUTO: 0.1 %
BILIRUB SERPL-MCNC: 0.4 MG/DL (ref 0.2–1.3)
BUN SERPL-MCNC: 12 MG/DL (ref 7–30)
CALCIUM SERPL-MCNC: 9.2 MG/DL (ref 8.5–10.1)
CHLORIDE SERPL-SCNC: 110 MMOL/L (ref 94–109)
CHOLEST SERPL-MCNC: 227 MG/DL
CO2 SERPL-SCNC: 26 MMOL/L (ref 20–32)
CREAT SERPL-MCNC: 0.86 MG/DL (ref 0.52–1.04)
DIFFERENTIAL METHOD BLD: NORMAL
EOSINOPHIL # BLD AUTO: 0 10E9/L (ref 0–0.7)
EOSINOPHIL NFR BLD AUTO: 0.1 %
ERYTHROCYTE [DISTWIDTH] IN BLOOD BY AUTOMATED COUNT: 13.5 % (ref 10–15)
GFR SERPL CREATININE-BSD FRML MDRD: >90 ML/MIN/{1.73_M2}
GLUCOSE SERPL-MCNC: 87 MG/DL (ref 70–99)
HBV SURFACE AB SERPL IA-ACNC: >1000 M[IU]/ML
HBV SURFACE AG SERPL QL IA: NONREACTIVE
HCT VFR BLD AUTO: 40 % (ref 35–47)
HCV AB SERPL QL IA: NONREACTIVE
HDLC SERPL-MCNC: 60 MG/DL
HGB BLD-MCNC: 12.9 G/DL (ref 11.7–15.7)
HIV 1+2 AB+HIV1 P24 AG SERPL QL IA: NONREACTIVE
LDLC SERPL CALC-MCNC: 152 MG/DL
LYMPHOCYTES # BLD AUTO: 1.8 10E9/L (ref 0.8–5.3)
LYMPHOCYTES NFR BLD AUTO: 25.7 %
MAGNESIUM SERPL-MCNC: 2.4 MG/DL (ref 1.6–2.3)
MCH RBC QN AUTO: 27.4 PG (ref 26.5–33)
MCHC RBC AUTO-ENTMCNC: 32.3 G/DL (ref 31.5–36.5)
MCV RBC AUTO: 85 FL (ref 78–100)
MONOCYTES # BLD AUTO: 0.3 10E9/L (ref 0–1.3)
MONOCYTES NFR BLD AUTO: 4.7 %
NEUTROPHILS # BLD AUTO: 4.7 10E9/L (ref 1.6–8.3)
NEUTROPHILS NFR BLD AUTO: 69.4 %
NONHDLC SERPL-MCNC: 167 MG/DL
PLATELET # BLD AUTO: 259 10E9/L (ref 150–450)
POTASSIUM SERPL-SCNC: 4.5 MMOL/L (ref 3.4–5.3)
PROT SERPL-MCNC: 7.7 G/DL (ref 6.8–8.8)
RBC # BLD AUTO: 4.7 10E12/L (ref 3.8–5.2)
SODIUM SERPL-SCNC: 140 MMOL/L (ref 133–144)
SPECIMEN SOURCE: ABNORMAL
T PALLIDUM AB SER QL: NONREACTIVE
TRIGL SERPL-MCNC: 74 MG/DL
TSH SERPL DL<=0.005 MIU/L-ACNC: 0.69 MU/L (ref 0.4–4)
WBC # BLD AUTO: 6.8 10E9/L (ref 4–11)
WET PREP SPEC: ABNORMAL

## 2019-04-18 PROCEDURE — 85025 COMPLETE CBC W/AUTO DIFF WBC: CPT | Performed by: FAMILY MEDICINE

## 2019-04-18 PROCEDURE — 0064U ANTB TP TOTAL&RPR IA QUAL: CPT | Performed by: FAMILY MEDICINE

## 2019-04-18 PROCEDURE — 83735 ASSAY OF MAGNESIUM: CPT | Performed by: FAMILY MEDICINE

## 2019-04-18 PROCEDURE — 36415 COLL VENOUS BLD VENIPUNCTURE: CPT | Performed by: FAMILY MEDICINE

## 2019-04-18 PROCEDURE — 87591 N.GONORRHOEAE DNA AMP PROB: CPT | Performed by: FAMILY MEDICINE

## 2019-04-18 PROCEDURE — 87389 HIV-1 AG W/HIV-1&-2 AB AG IA: CPT | Performed by: FAMILY MEDICINE

## 2019-04-18 PROCEDURE — 86706 HEP B SURFACE ANTIBODY: CPT | Performed by: FAMILY MEDICINE

## 2019-04-18 PROCEDURE — 99395 PREV VISIT EST AGE 18-39: CPT | Performed by: FAMILY MEDICINE

## 2019-04-18 PROCEDURE — 87624 HPV HI-RISK TYP POOLED RSLT: CPT | Performed by: FAMILY MEDICINE

## 2019-04-18 PROCEDURE — 80053 COMPREHEN METABOLIC PANEL: CPT | Performed by: FAMILY MEDICINE

## 2019-04-18 PROCEDURE — G0145 SCR C/V CYTO,THINLAYER,RESCR: HCPCS | Performed by: FAMILY MEDICINE

## 2019-04-18 PROCEDURE — 86803 HEPATITIS C AB TEST: CPT | Performed by: FAMILY MEDICINE

## 2019-04-18 PROCEDURE — 87210 SMEAR WET MOUNT SALINE/INK: CPT | Performed by: FAMILY MEDICINE

## 2019-04-18 PROCEDURE — 87340 HEPATITIS B SURFACE AG IA: CPT | Performed by: FAMILY MEDICINE

## 2019-04-18 PROCEDURE — 87491 CHLMYD TRACH DNA AMP PROBE: CPT | Performed by: FAMILY MEDICINE

## 2019-04-18 PROCEDURE — 84443 ASSAY THYROID STIM HORMONE: CPT | Performed by: FAMILY MEDICINE

## 2019-04-18 PROCEDURE — 80061 LIPID PANEL: CPT | Performed by: FAMILY MEDICINE

## 2019-04-18 RX ORDER — BUPROPION HYDROCHLORIDE 100 MG/1
150 TABLET, EXTENDED RELEASE ORAL DAILY
Qty: 45 TABLET | Refills: 1 | Status: SHIPPED | OUTPATIENT
Start: 2019-04-18 | End: 2019-05-26

## 2019-04-18 RX ORDER — NORETHINDRONE ACETATE AND ETHINYL ESTRADIOL .02; 1 MG/1; MG/1
1 TABLET ORAL DAILY
Qty: 84 TABLET | Refills: 3 | Status: SHIPPED | OUTPATIENT
Start: 2019-04-18 | End: 2020-04-20

## 2019-04-18 RX ORDER — METRONIDAZOLE 7.5 MG/G
1 GEL VAGINAL AT BEDTIME
Qty: 25 G | Refills: 0 | Status: SHIPPED | OUTPATIENT
Start: 2019-04-18 | End: 2019-05-26

## 2019-04-18 RX ORDER — TRAZODONE HYDROCHLORIDE 50 MG/1
50 TABLET, FILM COATED ORAL AT BEDTIME
Qty: 30 TABLET | Refills: 3 | Status: SHIPPED | OUTPATIENT
Start: 2019-04-18 | End: 2019-12-26

## 2019-04-18 RX ORDER — SERTRALINE HYDROCHLORIDE 100 MG/1
150 TABLET, FILM COATED ORAL DAILY
Qty: 45 TABLET | Refills: 3 | Status: SHIPPED | OUTPATIENT
Start: 2019-04-18 | End: 2019-12-25

## 2019-04-18 ASSESSMENT — ENCOUNTER SYMPTOMS
ABDOMINAL PAIN: 0
PALPITATIONS: 0
WEAKNESS: 1
PARESTHESIAS: 0
FEVER: 0
SORE THROAT: 0
DIARRHEA: 0
JOINT SWELLING: 0
NAUSEA: 0
NERVOUS/ANXIOUS: 1
ARTHRALGIAS: 0
CONSTIPATION: 0
MYALGIAS: 0
HEARTBURN: 0
DIZZINESS: 0
HEMATURIA: 0
HEADACHES: 0
CHILLS: 0
DYSURIA: 0
COUGH: 0
HEMATOCHEZIA: 0
BREAST MASS: 0
SHORTNESS OF BREATH: 0
EYE PAIN: 0
FREQUENCY: 0

## 2019-04-18 ASSESSMENT — PATIENT HEALTH QUESTIONNAIRE - PHQ9: SUM OF ALL RESPONSES TO PHQ QUESTIONS 1-9: 10

## 2019-04-18 ASSESSMENT — ANXIETY QUESTIONNAIRES: GAD7 TOTAL SCORE: 5

## 2019-04-18 NOTE — PATIENT INSTRUCTIONS
Breast exam monthly  Pelvic /ap today   Labs today   See psyche  continue counseling  Go to the ER if worse  If shakiness not better consider seeing neuro  See back 1 month     Preventive Health Recommendations  Female Ages 21 to 25     Yearly exam:     See your health care provider every year in order to  o Review health changes.   o Discuss preventive care.    o Review your medicines if your doctor has prescribed any.      You should be tested each year for STDs (sexually transmitted diseases).       Talk to your provider about how often you should have cholesterol testing.      Get a Pap test every three years. If you have an abnormal result, your doctor may have you test more often.      If you are at risk for diabetes, you should have a diabetes test (fasting glucose).     Shots:     Get a flu shot each year.     Get a tetanus shot every 10 years.     Consider getting the shot (vaccine) that prevents cervical cancer (Gardasil).    Nutrition:     Eat at least 5 servings of fruits and vegetables each day.    Eat whole-grain bread, whole-wheat pasta and brown rice instead of white grains and rice.    Get adequate Calcium and Vitamin D.     Lifestyle    Exercise at least 150 minutes a week each week (30 minutes a day, 5 days a week). This will help you control your weight and prevent disease.    Limit alcohol to one drink per day.    No smoking.     Wear sunscreen to prevent skin cancer.    See your dentist every six months for an exam and cleaning.  Preventive Health Recommendations  Female Ages 21 to 25     Yearly exam:   See your health care provider every year in order to  Review health changes.   Discuss preventive care.    Review your medicines if your doctor has prescribed any.    You should be tested each year for STDs (sexually transmitted diseases).     Talk to your provider about how often you should have cholesterol testing.    Get a Pap test every three years. If you have an abnormal result, your  doctor may have you test more often.    If you are at risk for diabetes, you should have a diabetes test (fasting glucose).     Shots:   Get a flu shot each year.   Get a tetanus shot every 10 years.   Consider getting the shot (vaccine) that prevents cervical cancer (Gardasil).    Nutrition:   Eat at least 5 servings of fruits and vegetables each day.  Eat whole-grain bread, whole-wheat pasta and brown rice instead of white grains and rice.  Get adequate Calcium and Vitamin D.     Lifestyle  Exercise at least 150 minutes a week each week (30 minutes a day, 5 days a week). This will help you control your weight and prevent disease.  Limit alcohol to one drink per day.  No smoking.   Wear sunscreen to prevent skin cancer.  See your dentist every six months for an exam and cleaning.

## 2019-04-18 NOTE — RESULT ENCOUNTER NOTE
Jarred Ms. Garcia,  Your results came back as follows   -Normal red blood cell (hgb) levels, normal white blood cell count and normal platelet levels.  -Yeast vaginal infection test is normal - no signs of a yeast infection.  -Bacterial vaginal infection test is POSITIVE.   ADVISE: starting treatment with metronidazole vaginal gel  0.75% one applicator nightly x 5 nights and a prescription has been sent to your pharmacy.  -Trichomonas vaginal infection test is normal - no signs of a trichomonas infection.. If you have any further concerns please do not hesitate to contact us by message, phone or making an appointment.  Have a good day   Dr Ramses BARAHONA

## 2019-04-18 NOTE — PROGRESS NOTES
"   SUBJECTIVE:   CC: Chelo Garcia is an 24 year old woman who presents for preventive health visit.     Healthy Habits:    Do you get at least three servings of calcium containing foods daily (dairy, green leafy vegetables, etc.)? { :264249::\"yes\"}    Amount of exercise or daily activities, outside of work: { :290508}    Problems taking medications regularly { :874143::\"No\"}    Medication side effects: { :194373::\"No\"}    Have you had an eye exam in the past two years? { :739292}    Do you see a dentist twice per year? { :404028}    Do you have sleep apnea, excessive snoring or daytime drowsiness?{ :585615}  {Outside tests to abstract? :571597}    {additional problems to add (Optional):148713}    Today's PHQ-2 Score:   PHQ-2 ( 1999 [employer]) 4/1/2019 2/14/2019   Q1: Little interest or pleasure in doing things 1 2   Q2: Feeling down, depressed or hopeless 1 3   PHQ-2 Score 2 5   Q1: Little interest or pleasure in doing things - -   Q2: Feeling down, depressed or hopeless - -   PHQ-2 Score - -     {PHQ-2 LOOK IN ASSESSMENTS (Optional) :573777}  Abuse: Current or Past(Physical, Sexual or Emotional)- {YES/NO/NA:521418}  Do you feel safe in your environment? {YES/NO/NA:844312}    Social History     Tobacco Use     Smoking status: Never Smoker     Smokeless tobacco: Former User   Substance Use Topics     Alcohol use: Yes     Comment: socially.     If you drink alcohol do you typically have >3 drinks per day or >7 drinks per week? {ETOH :992801}                     Reviewed orders with patient.  Reviewed health maintenance and updated orders accordingly - {Yes/No:263482::\"Yes\"}  {Chronicprobdata (Optional):591541}    {Mammo Decision Support (Optional):387359}    Pertinent mammograms are reviewed under the imaging tab.  History of abnormal Pap smear: {PAP HX:753563}     Reviewed and updated as needed this visit by clinical staff         Reviewed and updated as needed this visit by Provider        {HISTORY OPTIONS " "(Optional):338569}    ROS:  { :565501}    OBJECTIVE:   There were no vitals taken for this visit.  EXAM:  {Exam Choices:666899}    {Diagnostic Test Results (Optional):944005::\"Diagnostic Test Results:\",\"none \"}    ASSESSMENT/PLAN:   {Diag Picklist:133029}    COUNSELING:   {FEMALE COUNSELING MESSAGES:754546::\"Reviewed preventive health counseling, as reflected in patient instructions\"}    BP Readings from Last 1 Encounters:   04/01/19 144/66     Estimated body mass index is 29.18 kg/m  as calculated from the following:    Height as of 4/1/19: 1.626 m (5' 4\").    Weight as of 4/1/19: 77.1 kg (170 lb).    {BP Counseling- Complete if BP >= 120/80  (Optional):840207}  {Weight Management Plan (ACO) Complete if BMI is abnormal-  Ages 18-64  BMI >24.9.  Age 65+ with BMI <23 or >30 (Optional):174491}     reports that she has never smoked. She quit smokeless tobacco use about 6 months ago.  {Tobacco Cessation -- Complete if patient is a smoker (Optional):221126}    Counseling Resources:  ATP IV Guidelines  Pooled Cohorts Equation Calculator  Breast Cancer Risk Calculator  FRAX Risk Assessment  ICSI Preventive Guidelines  Dietary Guidelines for Americans, 2010  USDA's MyPlate  ASA Prophylaxis  Lung CA Screening    Andria Pearce MD  [facility]  "

## 2019-04-18 NOTE — RESULT ENCOUNTER NOTE
Jarred Ms. Garcia,  Your results came back and are within acceptable limits.   Negative for syphilis  Negative  for Hepatitis B infection and now immune to it from the Hep b vaccines you received  Magnesium minimally elevated   Increase water in diet and make sure not getting magnesium in any supplements  LDL cholesterol elevated. Continue to work on diet and exercise and rechecking fasting cholesterol next year.  -Liver and gallbladder tests are normal (ALT,AST, Alk phos, bilirubin), kidney function is normal (Cr, GFR), sodium is normal, potassium is normal, calcium is normal, glucose is normal.  -TSH (thyroid stimulating hormone) level is normal which indicates normal thyroid function.  -Hepatitis C antibody screen test shows no signs of a previous hepatitis C infection.  -HIV test is normal.. If you have any further concerns please do not hesitate to contact us by message, phone or making an appointment.  Have a good day   Dr Ramses BARAHONA

## 2019-04-18 NOTE — PROGRESS NOTES
SUBJECTIVE:   CC: Chelo Garcia is an 24 year old woman who presents for preventive health visit.     Healthy Habits:     Getting at least 3 servings of Calcium per day:  NO    Bi-annual eye exam:  NO    Dental care twice a year:  NO    Sleep apnea or symptoms of sleep apnea:  None    Diet:  Regular (no restrictions)    Frequency of exercise:  2-3 days/week    Duration of exercise:  30-45 minutes    Taking medications regularly:  Yes    Medication side effects:  Other    PHQ-2 Total Score: 2    Additional concerns today:  No    Shakiness  better on 1.5 tab of Wellbutrin  No nausea since on lower dose of sertraline  Feels mood is better but continues to have chronic passive suicidal thought. No active thoughts or plan, doing online counseling. Cannot afford to see therapist face to face and collecting money to be able to return to [facility]. Currently feels safe and has a safety plan in place. Here for physical and pap. A bit nervous about pap.     No fever or chills, no headache or dizziness, no double or blurry vision, no facial pain, earache, sore throat, runny nose, post nasal drip, no trouble hearing, smelling, tasting or swallowing, no cough , no chest pain, trouble breathing or palpitations, No abdominal pain, heart burn, reflux, nausea or vomiting or diarrhea or constipation, no blood in stools or black stools, no weight loss or night sweats. No dysuria, hematuria, frequency, urgency, hesitancy, incontinence, No pelvic complaints. No leg swelling or joint pain. No rash.    Hx of BMI 30, chronic mild low back pain intermittently, CHASE on atarax prn not used, MDD on sertraline, resolved proteinuria, on microgestin BCP, seen 1/29/18 for a physical. At that time declined meds, to speak to Konrad & referred for CBT.cbc, UA, CMP, TSH was normal. LDL was mildly elevated & wet prep showed BV & given metro gel. Std testing was all negative but not immune to hepatitis B. Advised revaccination but didn't come in.  Called and given metronidazole & diflucan in feb & symptoms of BV finally resolved. Sen then by gyn 2/21 & put on BCP. 5/4 seen for external hemorrhoids that are not bothering her too much currently. 5/17 seen finally by psychology. Reports no alcohol or drug use. pap negative 2016 due in 2019,  Uses OTC sleeping aid  to sleep, takes 2 pills every other day. nausea once the other day, may have been related to anxiety. Seen 6/1/18 started on sertraline , given atarax prn, continued on CBT and advised Vit D. Seen 6/15/18 Continued with chronic passive suicidal thoughts. No active plan. Not any worse. Has a Safety plan in place /crisis plan in place with therapist. Contracts to safety. Friends and sig other are supportive. Was  using Unisom about 2 pills a day. Denied use of alcohol or drugs when I saw her. Was seeing a therapist but due to expense was down to only once a month.  Given revaccination of Hep B as not immune. Increased sertraline to 75 mg daily. Seen 6/28 by Counsellor, reviewed safety plan, noted self cutting, unable to afford counseling, given resources for free counseling. Not looked into it yet.  seen 7/9 , got a cat , doing much better, sertraline increased to 100 mg.  Seen mid august and was doing well and advised follow up in 2 months. seen 9/20 for worsening depression and chronic passive suicidal thought and sertraline increased to 150, started on trazodone at bedtime and continued on atarax prn. Referred back to Counsellor and also to consider psyche. And given the flu shot. Seen 10/2/18 and noted mildly improved, advised a SAD lamp and continued same. Called few weeks later for psyche referral and given. Seen by Gabriela gonzalez 10/31/18. Notes not available. Sertraline increased to 200 mg by psyche. It really helped her mind but it made her v nauseous. Did not return to psyche. No script given.  Stopped sertraline due to nausea in jan 2019 Nausea stopped since meds discontinued. Mood was good  till stopped meds then scores high.  Had passive thoughts of death. No active plan. Declined  counseling.  Was doing CBT on line on [employer] & noted it was going well, was journaling too.  Taking atarax recently prn. On trazodone up to 3 days a week. Drinking less alcohol a glass of wine at a meal 2 a week. Seen 2/1/19 with worsened mood as off med 1.5 wk ( had  stopped due to nausea form med). Noted chronic intermittent passive thought of suicide. No active plan, had a safety plan in place and contracted to safety. Resumed sertraline at previous dose of 150 mg which she tolerated in the past & added Wellbutrin to help with mood. Was to continue with online CBT through [facility]. Advised to Increase aerobic exercise. & return back in 3 weeks to adjust further. 3rd Hep B given.      Not seen till 4/1/2019. Noted missed apt's due to illness and then didn't feel like getting out of bed. Had chronic passive suicidal thoughts initially worse then  better. Never acted on them and did  not plan to too. Lives with room mate and cat dooby. Works one job. Close to maternal grandparents. She had broken up with her boyfriend, her Maternal grandfather was not doing well due to chronic illness. Mother who she reported as white had bipolar, & Had not had a good relationship with her, dad was from [facility], on parole for minor misdemeanor, & to get deported in the following 40 days due to immigration issues, back to [facility]. She has a good friend and also roommate who were her supports. Had a safety plan in place. Felt the med changes had helped then life events occurred. Was doing online counseling and journaling. Noted had had 2 panic attacks in the prior three weeks and used atarax daily in the prior 2 weeks. Noted more night sweats even before Wellbutrin added. Was at times taking 2 of trazodone 50 mg for sleep. Denied alcohol use recently or drugs and at most drank caffeine 4 times a week, once a day. Had never had  hypomanic or manic symptoms. Contracted to safety and part of safety plan was in returning on 4/18 for recheck with physical. Was agreeable to med adjustment in Wellbutrin up to 200 mg to see if would help. Continued sertraline 150 mg daily. Atarax 25 mg every 8 hrs as needed for anxiety, then to wean off as anxiety improved. Continued trazodone 50 mg bedtime. Advised to go to the ER if worse. To avoid alcohol.   Called on 482 states she was a little shaky and thought it was from the Wellbutrin no seizures noted advised to decrease Wellbutrin to 1-1/2 tablet and continue rest of meds and to seek care with a psychiatrist.  Unable to see psychiatrist as cannot afford it feels shakiness is improved and mood is stable.     Mn  negative.  PHQ-9 ([employer]) 4/17/2019   1.  Little interest or pleasure in doing things 1   2.  Feeling down, depressed, or hopeless 1   3.  Trouble falling or staying asleep, or sleeping too much 1   4.  Feeling tired or having little energy 1   5.  Poor appetite or overeating 3   6.  Feeling bad about yourself 1   7.  Trouble concentrating 1   8.  Moving slowly or restless 0   9.  Suicidal or self-harm thoughts 1   PHQ-9 Total Score 10   Difficulty at work, home, or with people    In the past two weeks have you had thoughts of suicide or self harm? Yes   Do you have concerns about your personal safety or the safety of others? No   In the past 2 weeks have you thought about a plan or had intention to harm yourself?    In the past 2 weeks have you acted on these thoughts in any way?    1.  Little interest or pleasure in doing things Several days   2.  Feeling down, depressed, or hopeless Several days   3.  Trouble falling or staying asleep, or sleeping too much Several days   4.  Feeling tired or having little energy Several days   5.  Poor appetite or overeating Nearly every day   6.  Feeling bad about yourself Several days   7.  Trouble concentrating Several days   8.  Moving slowly or restless  Not at all   9.  Suicidal or self-harm thoughts Several days   PHQ-9 via [employer] TOTAL SCORE-----> 10 (Moderate depression)   Difficulty at work, home, or with people Somewhat difficult   F/U: Thoughts of suicide or self harm? Yes   F/U: Plan or intention for self harm? No   F/U: Acted on thoughts? No   F/U: Safety concerns for self or others? No   CHASE-7   [employer], 2002; Used with Permission) 4/17/2019   1. Feeling nervous, anxious, or on edge Several days   2. Not being able to stop or control worrying Several days   3. Worrying too much about different things Not at all   4. Trouble relaxing Several days   5. Being so restless that it is hard to sit still Several days   6. Becoming easily annoyed or irritable Several days   7. Feeling afraid, as if something awful might happen Not at all   CHASE 7 TOTAL SCORE 5 (mild anxiety)   1. Feeling nervous, anxious, or on edge 1   2. Not being able to stop or control worrying 1   3. Worrying too much about different things 0   4. Trouble relaxing 1   5. Being so restless that it is hard to sit still 1   6. Becoming easily annoyed or irritable 1   7. Feeling afraid, as if something awful might happen 0   CHASE-7 Total Score 5   If you checked any problems, how difficult have they made it for you to do your work, take care of things at home, or get along with other people?      Today's PHQ-2 Score:   PHQ-2 ( 1999 [employer]izer) 4/18/2019   Q1: Little interest or pleasure in doing things 1   Q2: Feeling down, depressed or hopeless 1   PHQ-2 Score 2   Q1: Little interest or pleasure in doing things Several days   Q2: Feeling down, depressed or hopeless Several days   PHQ-2 Score 2       Abuse: Current or Past(Physical, Sexual or Emotional)- No  Do you feel safe in your environment? Yes    Social History     Tobacco Use     Smoking status: Current Some Day Smoker     Types: Cigarettes     Smokeless tobacco: Former User     Quit date: 10/12/2018   Substance Use Topics     Alcohol use:  Yes     Comment: Socially, once a week     If you drink alcohol do you typically have >3 drinks per day or >7 drinks per week? No    Alcohol Use 4/18/2019   Prescreen: >3 drinks/day or >7 drinks/week? No     Reviewed orders with patient.  Reviewed health maintenance and updated orders accordingly - Yes  Labs reviewed in EPIC  BP Readings from Last 3 Encounters:   04/18/19 120/74   04/01/19 144/66   02/01/19 117/77    Wt Readings from Last 3 Encounters:   04/18/19 74.4 kg (164 lb)   04/01/19 77.1 kg (170 lb)   02/01/19 78 kg (172 lb)                  Patient Active Problem List   Diagnosis     BMI 28.0-28.9,adult     Major depressive disorder, recurrent episode, severe (H)     Generalized anxiety disorder     Psychophysiological insomnia     History reviewed. No pertinent surgical history.    Social History     Tobacco Use     Smoking status: Current Some Day Smoker     Types: Cigarettes     Smokeless tobacco: Former User     Quit date: 10/12/2018   Substance Use Topics     Alcohol use: Yes     Comment: Socially, once a week     Family History   Problem Relation Age of Onset     Ovarian Cancer Mother         first had cervical caner , 2016 got ovarian caner at age 38      Cervical Cancer Mother      Mental Illness Mother      Bipolar Disorder Mother      Breast Cancer Other          Current Outpatient Medications   Medication Sig Dispense Refill     buPROPion (WELLBUTRIN SR) 100 MG 12 hr tablet Take 1.5 tablets (150 mg) by mouth daily 45 tablet 1     hydrOXYzine (ATARAX) 25 MG tablet Take 1 tablet (25 mg) by mouth every 8 hours as needed for anxiety 45 tablet 0     metroNIDAZOLE (METROGEL) 0.75 % vaginal gel Place 1 applicator (5 g) vaginally At Bedtime for 5 days 25 g 0     norethindrone-ethinyl estradiol (MICROGESTIN) 1-20 MG-MCG tablet Take 1 tablet by mouth daily 84 tablet 3     sertraline (ZOLOFT) 100 MG tablet Take 1.5 tablets (150 mg) by mouth daily 45 tablet 3     traZODone (DESYREL) 50 MG tablet Take 1  tablet (50 mg) by mouth At Bedtime 30 tablet 3     No Known Allergies  Recent Labs   Lab Test 18  1028   A1C 5.1   *   HDL 68   TRIG 98   ALT 16   CR 0.75   GFRESTIMATED >90   GFRESTBLACK >90   POTASSIUM 4.1   TSH 0.96        Mammogram not appropriate for this patient based on age.    Pertinent mammograms are reviewed under the imaging tab.  History of abnormal Pap smear: NO - age 21-29 PAP every 3 years recommended  Last 3 Pap Results: No results found for: PAP  Last 3 Pap and HPV Results:       Reviewed and updated as needed this visit by clinical staff  Tobacco  Allergies  Meds  Problems  Med Hx  Surg Hx  Fam Hx  Soc Hx          Reviewed and updated as needed this visit by Provider  Tobacco  Allergies  Meds  Problems  Med Hx  Surg Hx  Fam Hx  Soc Hx         Past Medical History:   Diagnosis Date     Chronic midline low back pain without sciatica 2017     Depressive disorder 2017     Overweight 2017     Proteinuria     small protein on screening UA - recheck      History reviewed. No pertinent surgical history.  OB History    Para Term  AB Living   0 0 0 0 0 0   SAB TAB Ectopic Multiple Live Births   0 0 0 0 0       Review of Systems   Constitutional: Negative for chills and fever.   HENT: Negative for congestion, ear pain, hearing loss and sore throat.    Eyes: Negative for pain and visual disturbance.   Respiratory: Negative for cough and shortness of breath.    Cardiovascular: Negative for chest pain, palpitations and peripheral edema.   Gastrointestinal: Negative for abdominal pain, constipation, diarrhea, heartburn, hematochezia and nausea.   Breasts:  Negative for tenderness, breast mass and discharge.   Genitourinary: Negative for dysuria, frequency, genital sores, hematuria, pelvic pain, urgency, vaginal bleeding and vaginal discharge.   Musculoskeletal: Negative for arthralgias, joint swelling and myalgias.   Skin: Negative for rash.   Neurological:  Positive for weakness. Negative for dizziness, headaches and paresthesias.   Psychiatric/Behavioral: Positive for mood changes. The patient is nervous/anxious.       OBJECTIVE:   /74 (BP Location: Right arm, Patient Position: Sitting, Cuff Size: Adult Regular)   Pulse 82   Temp 98.4  F (36.9  C) (Oral)   Resp 18   Wt 74.4 kg (164 lb)   LMP 04/08/2019 (Exact Date)   SpO2 100%   BMI 28.15 kg/m    Physical Exam  GENERAL: healthy, alert, no distress, over weight and fatigued  EYES: Eyes grossly normal to inspection, PERRL and conjunctivae and sclerae normal  HENT: ear canals and TM's normal, nose and mouth without ulcers or lesions  NECK: no adenopathy, no asymmetry, masses, or scars and thyroid normal to palpation  RESP: lungs clear to auscultation - no rales, rhonchi or wheezes  BREAST: normal without masses, tenderness or nipple discharge and no palpable axillary masses or adenopathy  CV: regular rate and rhythm, normal S1 S2, no S3 or S4, no murmur, click or rub, no peripheral edema and peripheral pulses strong  ABDOMEN: soft, non tender, no hepatosplenomegaly, no masses and bowel sounds normal   (female): normal female external genitalia, normal urethral meatus, vaginal mucosa pink, moist, well rugated, and normal cervix/adnexa/uterus without masses or discharge. Pelvic, pap , HPV, wet prep , Gc obtained with mild difficulty  MS: no gross musculoskeletal defects noted, no edema  SKIN: no suspicious lesions or rashes  NEURO: Normal strength and tone, mentation intact and speech normal  PSYCH: mentation appears normal, affect normal/bright, anxious, judgement and insight intact and appearance well groomed  LYMPH: no cervical, supraclavicular, axillary, or inguinal adenopathy    Diagnostic Test Results:  Results for orders placed or performed in visit on 04/18/19 (from the past 24 hour(s))   CBC with platelets differential   Result Value Ref Range    WBC 6.8 4.0 - 11.0 10e9/L    RBC Count 4.70 3.8 - 5.2  10e12/L    Hemoglobin 12.9 11.7 - 15.7 g/dL    Hematocrit 40.0 35.0 - 47.0 %    MCV 85 78 - 100 fl    MCH 27.4 26.5 - 33.0 pg    MCHC 32.3 31.5 - 36.5 g/dL    RDW 13.5 10.0 - 15.0 %    Platelet Count 259 150 - 450 10e9/L    % Neutrophils 69.4 %    % Lymphocytes 25.7 %    % Monocytes 4.7 %    % Eosinophils 0.1 %    % Basophils 0.1 %    Absolute Neutrophil 4.7 1.6 - 8.3 10e9/L    Absolute Lymphocytes 1.8 0.8 - 5.3 10e9/L    Absolute Monocytes 0.3 0.0 - 1.3 10e9/L    Absolute Eosinophils 0.0 0.0 - 0.7 10e9/L    Absolute Basophils 0.0 0.0 - 0.2 10e9/L    Diff Method Automated Method    Wet prep   Result Value Ref Range    Specimen Description Vagina     Wet Prep No Trichomonas seen     Wet Prep Moderate  Clue cells seen   (A)     Wet Prep No yeast seen     Wet Prep Rare  WBC'S seen          ASSESSMENT/PLAN:   1. Well woman exam with routine gynecological exam  Breast exam monthly. Pelvic /pap done today. Labs today. Discussed safe sex. See psyche if mood continues to be worse. Continue counseling online. Go to the ER if worse. If shakiness not better consider seeing neuro. Continue sertraline 150 mg, Wellbutrin 150 mg, trazodone 50 mg, atarax prn. All meds refilled except for atarax as has enough. Passive chronic suicidal thoughts no active plan contracts to safety. Safety plan in place. Can consider increasing Wellbutrin back to 200 in near future. Birth control refilled. Discussed risk benefits and desires to continue. See back 1 month.   - NEISSERIA GONORRHOEA PCR  - CHLAMYDIA TRACHOMATIS PCR  - Hepatitis B Surface Antibody  - Hepatitis B surface antigen  - Hepatitis C Screen Reflex to HCV RNA Quant and Genotype  - HIV Antigen Antibody Combo  - Treponema Abs w Reflex to RPR and Titer  - Wet prep  - Pap imaged thin layer screen with HPV - recommended age 30 - 65 years (select HPV order below)  - HPV High Risk Types DNA Cervical    2. BMI 28.0-28.9,adult  Diet, exercise , weight loss  - Comprehensive metabolic panel  -  Lipid panel reflex to direct LDL Fasting  - TSH with free T4 reflex    3. Severe episode of recurrent major depressive disorder, without psychotic features (H)  See psyche if mood continues to be worse. Continue counseling online. Go to the ER if worse. If shakiness not better consider seeing neuro. Continue sertraline 150 mg, Wellbutrin 150 mg, trazodone 50 mg, atarax prn. All meds refilled except for atarax as has enough. Passive chronic suicidal thoughts no active plan contracts to safety. Safety plan in place.  - TSH with free T4 reflex  - sertraline (ZOLOFT) 100 MG tablet; Take 1.5 tablets (150 mg) by mouth daily  Dispense: 45 tablet; Refill: 3  - buPROPion (WELLBUTRIN SR) 100 MG 12 hr tablet; Take 1.5 tablets (150 mg) by mouth daily  Dispense: 45 tablet; Refill: 1    4. Generalized anxiety disorder  - TSH with free T4 reflex  - sertraline (ZOLOFT) 100 MG tablet; Take 1.5 tablets (150 mg) by mouth daily  Dispense: 45 tablet; Refill: 3  - buPROPion (WELLBUTRIN SR) 100 MG 12 hr tablet; Take 1.5 tablets (150 mg) by mouth daily  Dispense: 45 tablet; Refill: 1    5. Psychophysiological insomnia  - TSH with free T4 reflex  - traZODone (DESYREL) 50 MG tablet; Take 1 tablet (50 mg) by mouth At Bedtime  Dispense: 30 tablet; Refill: 3    6. Shakiness  Improved on lower dose of Wellbutrin. No seizure. Hod off on neuro now.   - CBC with platelets differential  - Comprehensive metabolic panel  - TSH with free T4 reflex  - Magnesium    7. Encounter for contraceptive management, unspecified type  Risks, benefits, discussed, desires to continue  - norethindrone-ethinyl estradiol (MICROGESTIN) 1-20 MG-MCG tablet; Take 1 tablet by mouth daily  Dispense: 84 tablet; Refill: 3    8. Screen for STD (sexually transmitted disease)  - NEISSERIA GONORRHOEA PCR  - CHLAMYDIA TRACHOMATIS PCR  - Hepatitis B Surface Antibody  - Hepatitis B surface antigen  - Hepatitis C Screen Reflex to HCV RNA Quant and Genotype  - HIV Antigen Antibody  "Combo  - Treponema Abs w Reflex to RPR and Titer  - Wet prep    9. BV (bacterial vaginosis)  Results back after visit. Sent [employer] message with dx and treatment  - metroNIDAZOLE (METROGEL) 0.75 % vaginal gel; Place 1 applicator (5 g) vaginally At Bedtime for 5 days  Dispense: 25 g; Refill: 0    COUNSELING:  Reviewed preventive health counseling, as reflected in patient instructions       Regular exercise       Healthy diet/nutrition       Vision screening       Alcohol Use       Contraception       Family planning       Safe sex practices/STD prevention       HIV screeninx in teen years, 1x in adult years, and at intervals if high risk    BP Readings from Last 1 Encounters:   19 120/74     Estimated body mass index is 28.15 kg/m  as calculated from the following:    Height as of 19: 1.626 m (5' 4\").    Weight as of this encounter: 74.4 kg (164 lb).      Weight management plan: Discussed healthy diet and exercise guidelines     reports that she has been smoking cigarettes.  She quit smokeless tobacco use about 6 months ago.      Counseling Resources:  ATP IV Guidelines  Pooled Cohorts Equation Calculator  Breast Cancer Risk Calculator  FRAX Risk Assessment  ICSI Preventive Guidelines  Dietary Guidelines for Americans, 2010  USDA's MyPlate  ASA Prophylaxis  Lung CA Screening    "

## 2019-04-19 LAB
C TRACH DNA SPEC QL NAA+PROBE: NEGATIVE
N GONORRHOEA DNA SPEC QL NAA+PROBE: NEGATIVE
SPECIMEN SOURCE: NORMAL
SPECIMEN SOURCE: NORMAL

## 2019-04-23 LAB
COPATH REPORT: NORMAL
PAP: NORMAL

## 2019-04-24 LAB
FINAL DIAGNOSIS: NORMAL
HPV HR 12 DNA CVX QL NAA+PROBE: NEGATIVE
HPV16 DNA SPEC QL NAA+PROBE: NEGATIVE
HPV18 DNA SPEC QL NAA+PROBE: NEGATIVE
SPECIMEN DESCRIPTION: NORMAL
SPECIMEN SOURCE CVX/VAG CYTO: NORMAL

## 2019-05-09 ENCOUNTER — MYC MEDICAL ADVICE (OUTPATIENT)
Dept: FAMILY MEDICINE | Facility: CLINIC | Age: 24
End: 2019-05-09

## 2019-05-10 NOTE — TELEPHONE ENCOUNTER
This [employer] message is being routed to provider for response to pt.  Mayra Kohler RN

## 2019-05-10 NOTE — TELEPHONE ENCOUNTER
Can you repeat phq on phone 7 let me know & she can schedule e visit . Would that be cheaper in the near future?

## 2019-05-26 ENCOUNTER — E-VISIT (OUTPATIENT)
Dept: FAMILY MEDICINE | Facility: CLINIC | Age: 24
End: 2019-05-26
Payer: COMMERCIAL

## 2019-05-26 DIAGNOSIS — F33.2 SEVERE EPISODE OF RECURRENT MAJOR DEPRESSIVE DISORDER, WITHOUT PSYCHOTIC FEATURES (H): ICD-10-CM

## 2019-05-26 DIAGNOSIS — F41.1 GENERALIZED ANXIETY DISORDER: ICD-10-CM

## 2019-05-26 PROCEDURE — 99444 ZZC PHYSICIAN ONLINE EVALUATION & MANAGEMENT SERVICE: CPT | Performed by: FAMILY MEDICINE

## 2019-05-26 RX ORDER — BUPROPION HYDROCHLORIDE 100 MG/1
150 TABLET, EXTENDED RELEASE ORAL DAILY
Qty: 45 TABLET | Refills: 1 | Status: SHIPPED | OUTPATIENT
Start: 2019-05-26 | End: 2019-12-25

## 2019-05-26 ASSESSMENT — ANXIETY QUESTIONNAIRES
2. NOT BEING ABLE TO STOP OR CONTROL WORRYING: SEVERAL DAYS
4. TROUBLE RELAXING: NOT AT ALL
1. FEELING NERVOUS, ANXIOUS, OR ON EDGE: SEVERAL DAYS
7. FEELING AFRAID AS IF SOMETHING AWFUL MIGHT HAPPEN: NOT AT ALL
5. BEING SO RESTLESS THAT IT IS HARD TO SIT STILL: NOT AT ALL
GAD7 TOTAL SCORE: 4
3. WORRYING TOO MUCH ABOUT DIFFERENT THINGS: SEVERAL DAYS
GAD7 TOTAL SCORE: 4
GAD7 TOTAL SCORE: 4
6. BECOMING EASILY ANNOYED OR IRRITABLE: SEVERAL DAYS
7. FEELING AFRAID AS IF SOMETHING AWFUL MIGHT HAPPEN: NOT AT ALL

## 2019-05-26 ASSESSMENT — PATIENT HEALTH QUESTIONNAIRE - PHQ9
10. IF YOU CHECKED OFF ANY PROBLEMS, HOW DIFFICULT HAVE THESE PROBLEMS MADE IT FOR YOU TO DO YOUR WORK, TAKE CARE OF THINGS AT HOME, OR GET ALONG WITH OTHER PEOPLE: SOMEWHAT DIFFICULT
SUM OF ALL RESPONSES TO PHQ QUESTIONS 1-9: 9
SUM OF ALL RESPONSES TO PHQ QUESTIONS 1-9: 9

## 2019-05-27 ASSESSMENT — ANXIETY QUESTIONNAIRES: GAD7 TOTAL SCORE: 4

## 2019-05-27 ASSESSMENT — PATIENT HEALTH QUESTIONNAIRE - PHQ9: SUM OF ALL RESPONSES TO PHQ QUESTIONS 1-9: 9

## 2019-07-03 DIAGNOSIS — F41.1 GENERALIZED ANXIETY DISORDER: ICD-10-CM

## 2019-07-05 NOTE — TELEPHONE ENCOUNTER
"Requested Prescriptions   Pending Prescriptions Disp Refills     hydrOXYzine (ATARAX) 25 MG tablet [Pharmacy Med Name: HYDROXYZINE HCL 25MG TABS (WHITE)]  Last Written Prescription Date:  4/1/2019  Last Fill Quantity: 45 tabs,  # refills: 0   Last office visit: 4/18/2019 with prescribing provider:  Ramses   Future Office Visit:     45 tablet 0     Sig: TAKE 1 TABLET(25 MG) BY MOUTH EVERY 8 HOURS AS NEEDED FOR ANXIETY       Antihistamines Protocol Passed - 7/3/2019  8:05 PM        Passed - Recent (12 mo) or future (30 days) visit within the authorizing provider's specialty     Patient had office visit in the last 12 months or has a visit in the next 30 days with authorizing provider or within the authorizing provider's specialty.  See \"Patient Info\" tab in inbasket, or \"Choose Columns\" in Meds & Orders section of the refill encounter.              Passed - Patient is age 3 or older     Apply age and/or weight-based dosing for peds patients age 3 and older.    Forward request to provider for patients under the age of 3.          Passed - Medication is active on med list          "

## 2019-07-06 RX ORDER — HYDROXYZINE HYDROCHLORIDE 25 MG/1
TABLET, FILM COATED ORAL
Qty: 45 TABLET | Refills: 1 | Status: SHIPPED | OUTPATIENT
Start: 2019-07-06 | End: 2019-12-25

## 2019-07-06 NOTE — TELEPHONE ENCOUNTER
LOV: 4/18/2019  Prescription approved per [facility] Refill Protocol.  Thanks! Berkley Rod RN

## 2019-08-22 ENCOUNTER — TELEPHONE (OUTPATIENT)
Dept: FAMILY MEDICINE | Facility: CLINIC | Age: 24
End: 2019-08-22

## 2019-08-22 ASSESSMENT — ANXIETY QUESTIONNAIRES
7. FEELING AFRAID AS IF SOMETHING AWFUL MIGHT HAPPEN: NOT AT ALL
6. BECOMING EASILY ANNOYED OR IRRITABLE: NOT AT ALL
GAD7 TOTAL SCORE: 0
2. NOT BEING ABLE TO STOP OR CONTROL WORRYING: NOT AT ALL
5. BEING SO RESTLESS THAT IT IS HARD TO SIT STILL: NOT AT ALL
IF YOU CHECKED OFF ANY PROBLEMS ON THIS QUESTIONNAIRE, HOW DIFFICULT HAVE THESE PROBLEMS MADE IT FOR YOU TO DO YOUR WORK, TAKE CARE OF THINGS AT HOME, OR GET ALONG WITH OTHER PEOPLE: NOT DIFFICULT AT ALL
3. WORRYING TOO MUCH ABOUT DIFFERENT THINGS: NOT AT ALL
1. FEELING NERVOUS, ANXIOUS, OR ON EDGE: NOT AT ALL

## 2019-08-22 ASSESSMENT — PATIENT HEALTH QUESTIONNAIRE - PHQ9
5. POOR APPETITE OR OVEREATING: NOT AT ALL
SUM OF ALL RESPONSES TO PHQ QUESTIONS 1-9: 1

## 2019-08-22 NOTE — TELEPHONE ENCOUNTER
Panel Management Review      Patient has the following on her problem list: None      Composite cancer screening  Chart review shows that this patient is due/due soon for the following None  Summary:    Patient is due/failing the following:   PHQ9    Action needed:   Patient needs to do PHQ9.    Type of outreach:    Phone, left message for patient to call back.     Questions for provider review:    None                                                                                                                                    Kaylah Epstein MA       Chart routed to Care Team.

## 2019-08-23 ASSESSMENT — ANXIETY QUESTIONNAIRES: GAD7 TOTAL SCORE: 0

## 2019-10-03 ENCOUNTER — HEALTH MAINTENANCE LETTER (OUTPATIENT)
Age: 24
End: 2019-10-03

## 2019-10-03 ENCOUNTER — PATIENT OUTREACH (OUTPATIENT)
Dept: CARE COORDINATION | Facility: CLINIC | Age: 24
End: 2019-10-03

## 2019-10-03 NOTE — PROGRESS NOTES
Clinic Care Coordination Contact  [facility]/Voicemail       Clinical Data: Care Coordinator Outreach  Outreach attempted x 1.  Left message on patient's voicemail with call back information and requested return call.  Left contact information for [place] [facility] on voicemail.    Plan:  Care Coordinator will try to reach patient again in 1-2 business days.

## 2019-10-07 ENCOUNTER — PATIENT OUTREACH (OUTPATIENT)
Dept: CARE COORDINATION | Facility: CLINIC | Age: 24
End: 2019-10-07

## 2019-10-07 NOTE — PROGRESS NOTES
Clinic Care Coordination Contact  [facility]/Voicemail       Clinical Data: Care Coordinator Outreach  Outreach attempted x 2.  Left message on patient's voicemail with call back information and requested return call.  Plan: Care Coordinator will send unable to contact letter with care coordinator contact information via mail. Care Coordinator will do no further outreaches at this time.

## 2019-10-07 NOTE — LETTER
[facility]  [address] [facility] [address]  Phone: [phone]      October 7, 2019      Chelo Garcia  [address]  [facility] [address]    Dear Chelo,    We have been trying to reach you to introduce you to [facility] Care Coordination program.  The goal of care coordination is to help you manage your health and improve access to the [facility] system in the most efficient manner.  The Care Coordinator is a nurse who understands the healthcare system and will assist you in improving your access to care.     As your Physician and Care Coordinator we partner to help you achieve your health care goals.     If you are in need of resources contact your Care Coordinator at [phone].  We at [facility] are focused on providing you with the highest-quality healthcare experience possible.      It is a pleasure to partner with you as we work towards achieving your optimal state of wellness.        Sincerely,        MART Aquino Louisa None   [address] [facility] [address]

## 2019-10-09 ENCOUNTER — TELEPHONE (OUTPATIENT)
Dept: PHARMACY | Facility: CLINIC | Age: 24
End: 2019-10-09

## 2019-10-09 NOTE — TELEPHONE ENCOUNTER
I saw that patient was scheduled with me next Monday with a referral reason of patient cannot afford medications.  I called patient today to find out exactly why she is coming in to try to help save on cost since she would have to pay out-of-pocket for my visit.  Patient states that she does not have insurance currently and is in the process of filling out paperwork for state insurance however states that the paperwork is confusing and so that is why she has not finished filling it out yet.  Patient states that she is just having trouble paying for her medicines but no other medication questions at this time. I encouraged patient to contact Danielle Jacinto the  who called and left a message on her voicemail.  She may be able to help navigate applying for her insurance.  I also encouraged her to go on [employer] website and find which pharmacy has the cheapest price for her medications and fill them at these pharmacies until she gets insurance.    She reports [facility] and spends at least $50 per prescription so she has to spread out when she gets her medications.  Patient reports she is completely out of medications at this time and ran out 2 weeks ago.    I also informed her that the [facility] prescription assistance program may be able to help her as well and she was given this phone number when the  left a message since last time.    Patient would like to cancel the appointment with me on Monday and follow-up with  first.  She will reschedule with me if she has further needs that cannot be addressed by the care coordination team or the prescription assistance program.    Ivana Young, PharmD  Medication Therapy Management Pharmacist

## 2019-10-10 ENCOUNTER — TELEPHONE (OUTPATIENT)
Dept: FAMILY MEDICINE | Facility: CLINIC | Age: 24
End: 2019-10-10

## 2019-10-11 ENCOUNTER — PATIENT OUTREACH (OUTPATIENT)
Dept: CARE COORDINATION | Facility: CLINIC | Age: 24
End: 2019-10-11

## 2019-10-11 NOTE — PROGRESS NOTES
Clinic Care Coordination Contact  [facility]/Voicemail       Clinical Data: Care Coordinator Outreach  Outreach attempted x 1.  Left message on patient's voicemail with call back information and requested return call.  Plan: Care Coordinator will try to reach patient again in 1-2 business days.

## 2019-10-15 ENCOUNTER — PATIENT OUTREACH (OUTPATIENT)
Dept: CARE COORDINATION | Facility: CLINIC | Age: 24
End: 2019-10-15

## 2019-10-15 NOTE — LETTER
[facility]ARE [facility]ORDIN[facility]  [address] [facility] [address]    October 15, 2019    Chelo Garcia  [address] [facility] [address]      Dear Chelo,    I am a Social Work Clinic Care Coordinator with [facility]. I have been attempting to reach you to provide any additional support you may need on achieving your health care related goals. If you would like to receive this assistance, please give me a call at [phone] to let me know if you would like to receive Clinic Care Coordination services.    All of us at the [facility] are invested in your health and are here to assist you in meeting your goals.     Sincerely,    MART Hazel  I have included resources with this letter to assist you with applying for state insurance and finding low cost medications.

## 2019-10-15 NOTE — PROGRESS NOTES
Clinic Care Coordination Contact  [facility]/Voicemail       Clinical Data: Care Coordinator Outreach  Outreach attempted x 2.  Left message on patient's voicemail with call back information and requested return call.  Plan: Care Coordinator will send unable to contact letter with care coordinator contact information via mail. Care Coordinator will do no further outreaches at this time.    SW will also mail the pt information for [facility] for assistance with applying for state insurance and affordable medication list from Health system.

## 2019-12-02 NOTE — TELEPHONE ENCOUNTER
Panel Management Review      Patient has the following on her problem list:     Depression / Dysthymia review    Measure:  Needs PHQ-9 score of 4 or less during index window.  Administer PHQ-9 and if score is 5 or more, send encounter to provider for next steps.    5 - 7 month window range: 4-6    PHQ-9 SCORE 5/19/2019 5/26/2019 8/22/2019   PHQ-9 Total Score MyChart 10 (Moderate depression) 9 (Mild depression) -   PHQ-9 Total Score 10 9 1       If PHQ-9 recheck is 5 or more, route to provider for next steps.    Patient is due for:  PHQ9 and DAP      Composite cancer screening  Chart review shows that this patient is due/due soon for the following None  Summary:    Patient is due/failing the following:   Flu shot and PHQ9    Action needed:   Patient needs nurse only appointment.    Type of outreach:    Phone, left message for patient to call back.     Questions for provider review:    None                                                                                                                                    Kaylah Epstein MA       Chart routed to Care Team .

## 2019-12-25 PROBLEM — F12.10 CANNABIS ABUSE, EPISODIC USE: Status: ACTIVE | Noted: 2019-12-25

## 2019-12-25 PROBLEM — Z72.0 CURRENT OCCASIONAL SMOKER: Status: ACTIVE | Noted: 2019-12-25

## 2019-12-26 ENCOUNTER — OFFICE VISIT (OUTPATIENT)
Dept: FAMILY MEDICINE | Facility: CLINIC | Age: 24
End: 2019-12-26
Payer: COMMERCIAL

## 2019-12-26 VITALS
DIASTOLIC BLOOD PRESSURE: 70 MMHG | HEART RATE: 86 BPM | SYSTOLIC BLOOD PRESSURE: 124 MMHG | OXYGEN SATURATION: 98 % | RESPIRATION RATE: 16 BRPM | BODY MASS INDEX: 29.74 KG/M2 | TEMPERATURE: 99 F | WEIGHT: 173.25 LBS

## 2019-12-26 DIAGNOSIS — F12.10 CANNABIS ABUSE, EPISODIC USE: ICD-10-CM

## 2019-12-26 DIAGNOSIS — Z23 NEED FOR PROPHYLACTIC VACCINATION AND INOCULATION AGAINST INFLUENZA: ICD-10-CM

## 2019-12-26 DIAGNOSIS — Z23 NEED FOR 23-POLYVALENT PNEUMOCOCCAL POLYSACCHARIDE VACCINE: ICD-10-CM

## 2019-12-26 DIAGNOSIS — Z72.0 CURRENT OCCASIONAL SMOKER: ICD-10-CM

## 2019-12-26 DIAGNOSIS — F41.1 GENERALIZED ANXIETY DISORDER: ICD-10-CM

## 2019-12-26 DIAGNOSIS — F33.2 SEVERE EPISODE OF RECURRENT MAJOR DEPRESSIVE DISORDER, WITHOUT PSYCHOTIC FEATURES (H): Primary | ICD-10-CM

## 2019-12-26 DIAGNOSIS — F51.04 PSYCHOPHYSIOLOGICAL INSOMNIA: ICD-10-CM

## 2019-12-26 PROCEDURE — 90471 IMMUNIZATION ADMIN: CPT | Performed by: FAMILY MEDICINE

## 2019-12-26 PROCEDURE — 90686 IIV4 VACC NO PRSV 0.5 ML IM: CPT | Performed by: FAMILY MEDICINE

## 2019-12-26 PROCEDURE — 99214 OFFICE O/P EST MOD 30 MIN: CPT | Mod: 25 | Performed by: FAMILY MEDICINE

## 2019-12-26 PROCEDURE — 90472 IMMUNIZATION ADMIN EACH ADD: CPT | Performed by: FAMILY MEDICINE

## 2019-12-26 PROCEDURE — 96127 BRIEF EMOTIONAL/BEHAV ASSMT: CPT | Performed by: FAMILY MEDICINE

## 2019-12-26 PROCEDURE — 90732 PPSV23 VACC 2 YRS+ SUBQ/IM: CPT | Performed by: FAMILY MEDICINE

## 2019-12-26 RX ORDER — TRAZODONE HYDROCHLORIDE 50 MG/1
50 TABLET, FILM COATED ORAL
Qty: 30 TABLET | Refills: 3 | Status: CANCELLED | OUTPATIENT
Start: 2019-12-26

## 2019-12-26 RX ORDER — BUPROPION HYDROCHLORIDE 100 MG/1
150 TABLET, EXTENDED RELEASE ORAL DAILY
Qty: 45 TABLET | Refills: 3 | Status: SHIPPED | OUTPATIENT
Start: 2019-12-26 | End: 2020-04-20

## 2019-12-26 RX ORDER — SERTRALINE HYDROCHLORIDE 100 MG/1
150 TABLET, FILM COATED ORAL DAILY
Qty: 45 TABLET | Refills: 3 | Status: SHIPPED | OUTPATIENT
Start: 2019-12-26 | End: 2020-04-20

## 2019-12-26 ASSESSMENT — ANXIETY QUESTIONNAIRES
1. FEELING NERVOUS, ANXIOUS, OR ON EDGE: SEVERAL DAYS
7. FEELING AFRAID AS IF SOMETHING AWFUL MIGHT HAPPEN: NOT AT ALL
3. WORRYING TOO MUCH ABOUT DIFFERENT THINGS: NOT AT ALL
7. FEELING AFRAID AS IF SOMETHING AWFUL MIGHT HAPPEN: NOT AT ALL
GAD7 TOTAL SCORE: 2
4. TROUBLE RELAXING: NOT AT ALL
GAD7 TOTAL SCORE: 2
6. BECOMING EASILY ANNOYED OR IRRITABLE: SEVERAL DAYS
2. NOT BEING ABLE TO STOP OR CONTROL WORRYING: NOT AT ALL
5. BEING SO RESTLESS THAT IT IS HARD TO SIT STILL: NOT AT ALL
GAD7 TOTAL SCORE: 2

## 2019-12-26 ASSESSMENT — PATIENT HEALTH QUESTIONNAIRE - PHQ9
10. IF YOU CHECKED OFF ANY PROBLEMS, HOW DIFFICULT HAVE THESE PROBLEMS MADE IT FOR YOU TO DO YOUR WORK, TAKE CARE OF THINGS AT HOME, OR GET ALONG WITH OTHER PEOPLE: SOMEWHAT DIFFICULT
SUM OF ALL RESPONSES TO PHQ QUESTIONS 1-9: 7
SUM OF ALL RESPONSES TO PHQ QUESTIONS 1-9: 7

## 2019-12-26 NOTE — PATIENT INSTRUCTIONS
Consider counselling  Meds restarted, ease into them , 1/2 tab , then 1 then 1.5 tab of sertraline and Wellbutrin each over few days  Monitor for side effects  Psychiatrist if not better  Avoid smoking and cannabis. These increase risk of blood clots and stroke with birth control pill use especially   Recommend flu shot yearly   Due to smoking hx also recommend pneumonia vaccine  Birth control enough refills at pharmacy from before   Return in 1 to 3 months for a follow up depending on mood,

## 2019-12-26 NOTE — PROGRESS NOTES
Subjective     Chelo Garcia is a 24 year old female who presents to clinic today for the following health issues:    HPI   Depression and Anxiety Follow-Up    How are you doing with your depression since your last visit? Doing ok    How are you doing with your anxiety since your last visit?  Doing ok    Are you having other symptoms that might be associated with depression or anxiety? No    Have you had a significant life event? OTHER: loss of 10 year old sister,      Do you have any concerns with your use of alcohol or other drugs? No    Social History     Tobacco Use     Smoking status: Current Some Day Smoker     Types: Cigarettes     Smokeless tobacco: Former User     Quit date: 10/12/2018   Substance Use Topics     Alcohol use: Yes     Comment: Socially, once a week     Drug use: Yes     Types: Marijuana     Comment: Once a month     PHQ 2019   PHQ-9 Total Score 9 1 7   Q9: Thoughts of better off dead/self-harm past 2 weeks Several days Not at all Not at all   F/U: Thoughts of suicide or self-harm Yes - -   F/U: Self harm-plan No - -   F/U: Self-harm action No - -   F/U: Safety concerns No - -     CHASE-7 SCORE 2019   Total Score 4 (minimal anxiety) - 2 (minimal anxiety)   Total Score 4 0 2     Last PHQ-9 2019   1.  Little interest or pleasure in doing things 1   2.  Feeling down, depressed, or hopeless 1   3.  Trouble falling or staying asleep, or sleeping too much 2   4.  Feeling tired or having little energy 2   5.  Poor appetite or overeating 0   6.  Feeling bad about yourself 0   7.  Trouble concentrating 1   8.  Moving slowly or restless 0   Q9: Thoughts of better off dead/self-harm past 2 weeks 0   PHQ-9 Total Score 7   Difficulty at work, home, or with people -   In the past two weeks have you had thoughts of suicide or self harm? -   Do you have concerns about your personal safety or the safety of others? -   In the past 2 weeks have  you thought about a plan or had intention to harm yourself? -   In the past 2 weeks have you acted on these thoughts in any way? -     In the past two weeks have you had thoughts of suicide or self-harm?  No.    Do you have concerns about your personal safety or the safety of others?   No    Suicide Assessment Five-step Evaluation and Treatment (SAFE-T)      How many servings of fruits and vegetables do you eat daily?  0-1    On average, how many sweetened beverages do you drink each day (Examples: soda, juice, sweet tea, etc.  Do NOT count diet or artificially sweetened beverages)?   0    How many days per week do you miss taking your medication? hasn't been taking    BACKGROUND  Smoker, marijuana use, BMI now > 28, hx of CHASE , MDD on sertraline, Wellbutrin , trazodone and atarax prn, resolved low back pain, on combined OCP,     Hx of BMI 30, chronic mild low back pain intermittently, CHASE on atarax prn not used, MDD on sertraline, resolved proteinuria, on microgestin BCP, seen 1/29/18 for a physical. At that time declined meds, to speak to Konrad & referred for CBT.cbc, UA, CMP, TSH was normal. LDL was mildly elevated & wet prep showed BV & given metro gel. Std testing was all negative but not immune to hepatitis B. Advised revaccination but didn't come in. Called and given metronidazole & diflucan in feb & symptoms of BV finally resolved. Sen then by gyn 2/21 & put on BCP. 5/4 seen for external hemorrhoids that are not bothering her too much currently. 5/17 seen finally by psychology. Reports no alcohol or drug use. pap negative 2016 due in 2019,  Uses OTC sleeping aid  to sleep, takes 2 pills every other day. nausea once the other day, may have been related to anxiety. Seen 6/1/18 started on sertraline , given atarax prn, continued on CBT and advised Vit D. Seen 6/15/18 Continued with chronic passive suicidal thoughts. No active plan. Not any worse. Has a Safety plan in place /crisis plan in place with therapist.  Contracts to safety. Friends and sig other are supportive. Was  using Unisom about 2 pills a day. Denied use of alcohol or drugs when I saw her. Was seeing a therapist but due to expense was down to only once a month.  Given revaccination of Hep B as not immune. Increased sertraline to 75 mg daily. Seen 6/28 by Counsellor, reviewed safety plan, noted self cutting, unable to afford counseling, given resources for free counseling. Not looked into it yet.  seen 7/9 , got a cat , doing much better, sertraline increased to 100 mg.  Seen mid august and was doing well and advised follow up in 2 months. seen 9/20 for worsening depression and chronic passive suicidal thought and sertraline increased to 150, started on trazodone at bedtime and continued on atarax prn. Referred back to Counsellor and also to consider psyche. And given the flu shot. Seen 10/2/18 and noted mildly improved, advised a SAD lamp and continued same. Called few weeks later for psyche referral and given. Seen by Gabriela gonzalez 10/31/18. Notes not available. Sertraline increased to 200 mg by psyche. It really helped her mind but it made her v nauseous. Did not return to psyche. No script given.  Stopped sertraline due to nausea in jan 2019 Nausea stopped since meds discontinued. Mood was good till stopped meds then scores high.  Had passive thoughts of death. No active plan. Declined  counseling.  Was doing CBT on line on [employer] & noted it was going well, was journaling too.  Taking atarax recently prn. On trazodone up to 3 days a week. Drinking less alcohol a glass of wine at a meal 2 a week. Seen 2/1/19 with worsened mood as off med 1.5 wk ( had  stopped due to nausea form med). Noted chronic intermittent passive thought of suicide. No active plan, had a safety plan in place and contracted to safety. Resumed sertraline at previous dose of 150 mg which she tolerated in the past & added Wellbutrin to help with mood. Was to continue with  online CBT through [employer]. Advised to Increase aerobic exercise. & return back in 3 weeks to adjust further. 3rd Hep B given.      Not seen till 4/1/2019. Noted missed apt's due to illness and then didn't feel like getting out of bed. Had chronic passive suicidal thoughts initially worse then  better. Never acted on them and did  not plan to too. Lives with room mate and cat dooby. Works one job. Close to maternal grandparents. She had broken up with her boyfriend, her Maternal grandfather was not doing well due to chronic illness. Mother who she reported as white had bipolar, & Had not had a good relationship with her, dad was from [facility], on parole for minor misdemeanor, & to get deported in the following 40 days due to immigration issues, back to [facility]. She has a good friend and also roommate who were her supports. Had a safety plan in place. Felt the med changes had helped then life events occurred. Was doing online counseling and journaling. Noted had had 2 panic attacks in the prior three weeks and used atarax daily in the prior 2 weeks. Noted more night sweats even before Wellbutrin added. Was at times taking 2 of trazodone 50 mg for sleep. Denied alcohol use recently or drugs and at most drank caffeine 4 times a week, once a day. Had never had hypomanic or manic symptoms. Contracted to safety and part of safety plan was in returning on 4/18 for recheck with physical. Was agreeable to med adjustment in Wellbutrin up to 200 mg to see if would help. Continued sertraline 150 mg daily. Atarax 25 mg every 8 hrs as needed for anxiety, then to wean off as anxiety improved. Continued trazodone 50 mg bedtime. Advised to go to the ER if worse. To avoid alcohol.   Called on 4/2 stating she was a little shaky and thought it was from the Wellbutrin, no seizures noted advised to decrease Wellbutrin to 1-1/2 tablet and continue rest of meds and to seek care with a psychiatrist.  Unable to see psychiatrist as could  not  afford it , felt shakiness improved and mood was stable.   Seen  for a physical and pap, continued on meds, had chronic passive suicidal ideation, no active plan, contracted to safety. Birth control refilled after discussing side effects and advised follow up in one month. Was Rx for BV with Metrogel. Was negative for GC, syphilis, TSH, hep C, HIV and cbc were normal. LDL was elevated. Had an e visit 2019 and med refilled 3 months, remained with passive SI on Q 9, did not come in due to insurance issues and looks like might have run of meds a while. Not seen since April. [place]  negative.    Here for follow up of mood and meds.   10 yr old aspirate on motrin right before  and  . Was 1/2 sister on mothers side. 1/2 sister on dads side still alive.  Dad not deported yet still working on it.   Had a surgical D & C for  2 weeks ago   Ran out of meds as couldn't afford them though had refills on BCP  Gone two months off depression and anxiety meds  Despite all of that feels better, declines counseling and psyche referral. Going to grieving group with mom. Relationship with her better.   Still smoking and doing cannabis  Hardly any alcohol 1 glass of wine once a week  Living with same roommate and cat  Declines atarax or trazodone  Desires same previous dosing of sertraline and Wellbutrin  Agreeable to flu and pneumonia vaccine    No fever or chills, no headache or dizziness, no double or blurry vision, no facial pain, earache, sore throat, runny nose, post nasal drip, no trouble hearing, smelling, tasting or swallowing, no cough , no chest pain, trouble breathing or palpitations, No abdominal pain, heart burn, reflux, nausea or vomiting or diarrhea or constipation, no blood in stools or black stools, no weight loss or night sweats. No dysuria, hematuria, frequency, urgency, hesitancy, incontinence, No pelvic complaints. No leg swelling or joint pain. No rash.    Patient Active Problem  List   Diagnosis     BMI 28.0-28.9,adult     Major depressive disorder, recurrent episode, severe (H)     Generalized anxiety disorder     Psychophysiological insomnia     Current occasional smoker     Cannabis abuse, episodic use     Past Surgical History:   Procedure Laterality Date     AS INDUCED ABORTN BY D&C  12/2019       Social History     Tobacco Use     Smoking status: Current Some Day Smoker     Types: Cigarettes     Smokeless tobacco: Former User     Quit date: 10/12/2018   Substance Use Topics     Alcohol use: Yes     Comment: Socially, once a week     Family History   Problem Relation Age of Onset     Ovarian Cancer Mother         first had cervical caner , 2016 got ovarian caner at age 38      Cervical Cancer Mother      Mental Illness Mother      Bipolar Disorder Mother      Breast Cancer Other          Current Outpatient Medications   Medication Sig Dispense Refill     buPROPion (WELLBUTRIN SR) 100 MG 12 hr tablet Take 1.5 tablets (150 mg) by mouth daily 45 tablet 3     sertraline (ZOLOFT) 100 MG tablet Take 1.5 tablets (150 mg) by mouth daily 45 tablet 3     norethindrone-ethinyl estradiol (MICROGESTIN) 1-20 MG-MCG tablet Take 1 tablet by mouth daily (Patient not taking: Reported on 12/26/2019) 84 tablet 3     No Known Allergies  Recent Labs   Lab Test 04/18/19  0857 01/29/18  1028   A1C  --  5.1   * 127*   HDL 60 68   TRIG 74 98   ALT 18 16   CR 0.86 0.75   GFRESTIMATED >90 >90   GFRESTBLACK >90 >90   POTASSIUM 4.5 4.1   TSH 0.69 0.96      BP Readings from Last 3 Encounters:   12/26/19 124/70   04/18/19 120/74   04/01/19 144/66    Wt Readings from Last 3 Encounters:   12/26/19 78.6 kg (173 lb 4 oz)   04/18/19 74.4 kg (164 lb)   04/01/19 77.1 kg (170 lb)         Reviewed and updated as needed this visit by Provider  Tobacco  Allergies  Meds  Problems  Med Hx  Surg Hx  Fam Hx  Soc Hx          Review of Systems   ROS COMP: Constitutional, HEENT, cardiovascular, pulmonary, GI, ,  musculoskeletal, neuro, skin, endocrine and psych systems are negative, except as otherwise noted.      Objective    /70 (BP Location: Right arm, Patient Position: Chair, Cuff Size: Adult Large)   Pulse 86   Temp 99  F (37.2  C) (Oral)   Resp 16   Wt 78.6 kg (173 lb 4 oz)   SpO2 98%   BMI 29.74 kg/m    Body mass index is 29.74 kg/m .  Physical Exam   GENERAL: healthy, alert, no distress and obese  EYES: Eyes grossly normal to inspection, PERRL and conjunctivae and sclerae normal  HENT: ear canals and TM's normal, nose and mouth without ulcers or lesions  NECK: no adenopathy, no asymmetry, masses, or scars and thyroid normal to palpation  RESP: lungs clear to auscultation - no rales, rhonchi or wheezes  CV: regular rate and rhythm, normal S1 S2, no S3 or S4, no murmur, click or rub, no peripheral edema and peripheral pulses strong  ABDOMEN: soft, non tender, no hepatosplenomegaly, no masses and bowel sounds normal  MS: no gross musculoskeletal defects noted, no edema  SKIN: no suspicious lesions or rashes  NEURO: Normal strength and tone, mentation intact and speech normal  PSYCH: mentation appears normal, affect normal/bright    Diagnostic Test Results:  Labs reviewed in Epic  No results found for this or any previous visit (from the past 24 hour(s)).        Assessment & Plan       ICD-10-CM    1. Severe episode of recurrent major depressive disorder, without psychotic features (H) F33.2 buPROPion (WELLBUTRIN SR) 100 MG 12 hr tablet     sertraline (ZOLOFT) 100 MG tablet   2. Generalized anxiety disorder F41.1 buPROPion (WELLBUTRIN SR) 100 MG 12 hr tablet     sertraline (ZOLOFT) 100 MG tablet   3. Psychophysiological insomnia F51.04    4. BMI 30.0-30.9,adult Z68.30    5. Current occasional smoker Z72.0 VACCINE ADMINISTRATION, INITIAL     HC FLU VAC PRESRV FREE QUAD SPLIT VIR > 6 MONTHS IM     Pneumococcal vaccine 23 valent PPSV23  (Pneumovax) [[id number]]     VACCINE ADMINISTRATION, EACH ADDITIONAL   6.  Cannabis abuse, episodic use F12.10    7. Need for prophylactic vaccination and inoculation against influenza Z23 VACCINE ADMINISTRATION, INITIAL     HC FLU VAC PRESRV FREE QUAD SPLIT VIR > 6 MONTHS IM   8. Need for 23-polyvalent pneumococcal polysaccharide vaccine Z23 Pneumococcal vaccine 23 valent PPSV23  (Pneumovax) [[id number]]     VACCINE ADMINISTRATION, EACH ADDITIONAL     Seen after many months due to loss of insurance.  Despite significant recent losses and situations and being off her meds 2 months seems to be doing pretty good.  Desires though to return back to sertraline and Wellbutrin.  Declines need for trazodone or Atarax.  Offered counseling but declined is doing grief counseling with her mother who she now has a better relationship with and her dad's not been deported yet and they are trying to work to keep him here.  She is no longer with her boyfriend and now has a steady job and continues with her roommate and a cat.  Declines need to see a psychiatrist yet.  Restart sertraline 150 mg and Wellbutrin 150 mg daily.  May ease into them starting with half tablet each for a few days then 1 tablet and then up to the full strength of 1-1/2 tablet each. Monitor for side effects.  If mood gets worse on medication may need to re-assess if actually needs both meds are just 1 or just counseling or see a psychiatrist if not better. Encouraged to avoid smoking and cannabis. These increase risk of blood clots and stroke with birth control pill use especially. Recommended  flu shot yearly & due to smoking hx also recommend pneumonia vaccine.  These were given today.  Reminded that she had enough Birth control refills on file at her pharmacy from before will April of next year when due again for a physical.  In the future insurance becomes an issue again recommended to [facility] at [facility] for free birth control if needed.  Reminded to always use protection. Return in 1 to 3 months for a follow up depending  "on mood.  He is agreeable with this plan.    Tobacco Cessation:   reports that she has been smoking cigarettes. She quit smokeless tobacco use about 14 months ago.  Tobacco Cessation Action Plan: Information offered: Patient not interested at this time    BMI:   Estimated body mass index is 29.74 kg/m  as calculated from the following:    Height as of 4/1/19: 1.626 m (5' 4\").    Weight as of this encounter: 78.6 kg (173 lb 4 oz).   Weight management plan: Discussed healthy diet and exercise guidelines  Work on weight loss  Regular exercise  See Patient Instructions    Return in about 3 months (around 3/26/2020) for Routine Visit for chronic issues with PCP.    Andria Pearce MD  [facility]        "

## 2019-12-27 ASSESSMENT — PATIENT HEALTH QUESTIONNAIRE - PHQ9: SUM OF ALL RESPONSES TO PHQ QUESTIONS 1-9: 7

## 2019-12-27 ASSESSMENT — ANXIETY QUESTIONNAIRES: GAD7 TOTAL SCORE: 2

## 2020-04-17 DIAGNOSIS — F33.2 SEVERE EPISODE OF RECURRENT MAJOR DEPRESSIVE DISORDER, WITHOUT PSYCHOTIC FEATURES (H): ICD-10-CM

## 2020-04-17 DIAGNOSIS — F41.1 GENERALIZED ANXIETY DISORDER: ICD-10-CM

## 2020-04-17 DIAGNOSIS — Z30.9 ENCOUNTER FOR CONTRACEPTIVE MANAGEMENT, UNSPECIFIED TYPE: ICD-10-CM

## 2020-04-17 NOTE — TELEPHONE ENCOUNTER
"Requested Prescriptions   Pending Prescriptions Disp Refills     sertraline (ZOLOFT) 100 MG tablet [Pharmacy Med Name: SERTRALINE 100MG TABLETS]  Last Written Prescription Date:  12-26-19  Last Fill Quantity: 45 tab,  # refills: 3   Last office visit: 12/26/2019 with prescribing provider:  FLORENCIO Pearce   Future Office Visit:   45 tablet 3     Sig: TAKE 1 AND 1/2 TABLETS(150 MG) BY MOUTH DAILY       SSRIs Protocol Failed - 4/17/2020  9:28 AM        Failed - PHQ-9 score less than 5 in past 6 months     Please review last PHQ-9 score.   PHQ 5/26/2019 8/22/2019 12/26/2019   PHQ-9 Total Score 9 1 7   Q9: Thoughts of better off dead/self-harm past 2 weeks Several days Not at all Not at all   F/U: Thoughts of suicide or self-harm Yes - -   F/U: Self harm-plan No - -   F/U: Self-harm action No - -   F/U: Safety concerns No - -      CHASE-7 SCORE 5/26/2019 8/22/2019 12/26/2019   Total Score 4 (minimal anxiety) - 2 (minimal anxiety)   Total Score 4 0 2           Passed - Medication is Bupropion     If the medication is Bupropion (Wellbutrin), and the patient is taking for smoking cessation; OK to refill.          Passed - Medication is active on med list        Passed - Patient is age 18 or older        Passed - No active pregnancy on record        Passed - No positive pregnancy test in last 12 months        Passed - Recent (6 mo) or future (30 days) visit within the authorizing provider's specialty     Patient had office visit in the last 6 months or has a visit in the next 30 days with authorizing provider or within the authorizing provider's specialty.  See \"Patient Info\" tab in inbasket, or \"Choose Columns\" in Meds & Orders section of the refill encounter.            ____________________________________         norethindrone-ethinyl estradiol (MICROGESTIN 1/20) 1-20 MG-MCG tablet [Pharmacy Med Name: NORETHINDRONE ACET/ETH 1/20 TB 21'S]  Last Written Prescription Date:  4-18-19  Last Fill Quantity: 84 tab,  # refills: 3   Last " "office visit: 12/26/2019 with prescribing provider:  FLORENCIO Pearce   Future Office Visit:   84 tablet 3     Sig: TAKE 1 TABLET BY MOUTH DAILY       Contraceptives Protocol Passed - 4/17/2020  9:28 AM        Passed - Patient is not a current smoker if age is 35 or older        Passed - Recent (12 mo) or future (30 days) visit within the authorizing provider's specialty     Patient has had an office visit with the authorizing provider or a provider within the authorizing providers department within the previous 12 mos or has a future within next 30 days. See \"Patient Info\" tab in inbasket, or \"Choose Columns\" in Meds & Orders section of the refill encounter.            Passed - Medication is active on med list        Passed - No active pregnancy on record        Passed - No positive pregnancy test in past 12 months        ____________________________________         buPROPion (WELLBUTRIN SR) 100 MG 12 hr tablet [Pharmacy Med Name: BUPROPION SR 100MG TABLETS (12H)]  Last Written Prescription Date:  12-26-19  Last Fill Quantity: 45 tab,  # refills: 3   Last office visit: 12/26/2019 with prescribing provider:  FLORENCIO Pearce   Future Office Visit:   45 tablet 3     Sig: TAKE 1 AND 1/2 TABLETS(150 MG) BY MOUTH DAILY       SSRIs Protocol Failed - 4/17/2020  9:28 AM        Failed - PHQ-9 score less than 5 in past 6 months     Please review last PHQ-9 score.   PHQ 5/26/2019 8/22/2019 12/26/2019   PHQ-9 Total Score 9 1 7   Q9: Thoughts of better off dead/self-harm past 2 weeks Several days Not at all Not at all   F/U: Thoughts of suicide or self-harm Yes - -   F/U: Self harm-plan No - -   F/U: Self-harm action No - -   F/U: Safety concerns No - -      CHASE-7 SCORE 5/26/2019 8/22/2019 12/26/2019   Total Score 4 (minimal anxiety) - 2 (minimal anxiety)   Total Score 4 0 2           Passed - Medication is Bupropion     If the medication is Bupropion (Wellbutrin), and the patient is taking for smoking cessation; OK to refill.          Passed - " "Medication is active on med list        Passed - Patient is age 18 or older        Passed - No active pregnancy on record        Passed - No positive pregnancy test in last 12 months        Passed - Recent (6 mo) or future (30 days) visit within the authorizing provider's specialty     Patient had office visit in the last 6 months or has a visit in the next 30 days with authorizing provider or within the authorizing provider's specialty.  See \"Patient Info\" tab in inbasket, or \"Choose Columns\" in Meds & Orders section of the refill encounter.              "

## 2020-04-20 RX ORDER — BUPROPION HYDROCHLORIDE 100 MG/1
TABLET, EXTENDED RELEASE ORAL
Qty: 45 TABLET | Refills: 0 | Status: SHIPPED | OUTPATIENT
Start: 2020-04-20 | End: 2020-05-19

## 2020-04-20 RX ORDER — NORETHINDRONE ACETATE AND ETHINYL ESTRADIOL .02; 1 MG/1; MG/1
1 TABLET ORAL DAILY
Qty: 84 TABLET | Refills: 2 | Status: SHIPPED | OUTPATIENT
Start: 2020-04-20 | End: 2020-05-19

## 2020-04-20 RX ORDER — SERTRALINE HYDROCHLORIDE 100 MG/1
TABLET, FILM COATED ORAL
Qty: 45 TABLET | Refills: 0 | Status: SHIPPED | OUTPATIENT
Start: 2020-04-20 | End: 2020-05-19

## 2020-04-20 NOTE — TELEPHONE ENCOUNTER
Routing refill request to provider for review/approval because:  --last PHQ-9 >4.  --Plan in last office visit 12/26/19 was RTC 3 months.     ,  --Please contact patient and ask her to schedule a virtual visit with .    --A refill request for below medication was sent to the provider.

## 2020-04-21 NOTE — TELEPHONE ENCOUNTER
LM informing patient that RX was refilled. Patient is due for virtual visit prior to additional refills given.       Sherice BRAR     [facility]

## 2020-05-19 ENCOUNTER — VIRTUAL VISIT (OUTPATIENT)
Dept: FAMILY MEDICINE | Facility: CLINIC | Age: 25
End: 2020-05-19
Payer: COMMERCIAL

## 2020-05-19 VITALS — BODY MASS INDEX: 32.15 KG/M2 | HEIGHT: 65 IN | WEIGHT: 193 LBS

## 2020-05-19 DIAGNOSIS — Z30.9 ENCOUNTER FOR CONTRACEPTIVE MANAGEMENT, UNSPECIFIED TYPE: ICD-10-CM

## 2020-05-19 DIAGNOSIS — F41.1 GENERALIZED ANXIETY DISORDER: ICD-10-CM

## 2020-05-19 DIAGNOSIS — F33.2 SEVERE EPISODE OF RECURRENT MAJOR DEPRESSIVE DISORDER, WITHOUT PSYCHOTIC FEATURES (H): ICD-10-CM

## 2020-05-19 PROCEDURE — 99214 OFFICE O/P EST MOD 30 MIN: CPT | Mod: 95 | Performed by: FAMILY MEDICINE

## 2020-05-19 PROCEDURE — 96127 BRIEF EMOTIONAL/BEHAV ASSMT: CPT | Performed by: FAMILY MEDICINE

## 2020-05-19 RX ORDER — NORETHINDRONE ACETATE AND ETHINYL ESTRADIOL .02; 1 MG/1; MG/1
1 TABLET ORAL DAILY
Qty: 84 TABLET | Refills: 1 | Status: SHIPPED | OUTPATIENT
Start: 2020-05-19 | End: 2021-01-11

## 2020-05-19 RX ORDER — SERTRALINE HYDROCHLORIDE 100 MG/1
150 TABLET, FILM COATED ORAL DAILY
Qty: 45 TABLET | Refills: 5 | Status: SHIPPED | OUTPATIENT
Start: 2020-05-19 | End: 2020-12-22

## 2020-05-19 RX ORDER — BUPROPION HYDROCHLORIDE 100 MG/1
150 TABLET, EXTENDED RELEASE ORAL DAILY
Qty: 45 TABLET | Refills: 5 | Status: SHIPPED | OUTPATIENT
Start: 2020-05-19 | End: 2020-12-22

## 2020-05-19 ASSESSMENT — PATIENT HEALTH QUESTIONNAIRE - PHQ9
SUM OF ALL RESPONSES TO PHQ QUESTIONS 1-9: 6
5. POOR APPETITE OR OVEREATING: NOT AT ALL

## 2020-05-19 ASSESSMENT — ANXIETY QUESTIONNAIRES
IF YOU CHECKED OFF ANY PROBLEMS ON THIS QUESTIONNAIRE, HOW DIFFICULT HAVE THESE PROBLEMS MADE IT FOR YOU TO DO YOUR WORK, TAKE CARE OF THINGS AT HOME, OR GET ALONG WITH OTHER PEOPLE: NOT DIFFICULT AT ALL
7. FEELING AFRAID AS IF SOMETHING AWFUL MIGHT HAPPEN: NOT AT ALL
1. FEELING NERVOUS, ANXIOUS, OR ON EDGE: SEVERAL DAYS
GAD7 TOTAL SCORE: 3
6. BECOMING EASILY ANNOYED OR IRRITABLE: NOT AT ALL
2. NOT BEING ABLE TO STOP OR CONTROL WORRYING: SEVERAL DAYS
3. WORRYING TOO MUCH ABOUT DIFFERENT THINGS: SEVERAL DAYS
5. BEING SO RESTLESS THAT IT IS HARD TO SIT STILL: NOT AT ALL

## 2020-05-19 ASSESSMENT — MIFFLIN-ST. JEOR: SCORE: 1613.38

## 2020-05-19 NOTE — PROGRESS NOTES
"Chelo Garcia is a 25 year old female who is being evaluated via a billable video visit.      The patient has been notified of following:     \"This video visit will be conducted via a call between you and your physician/provider. We have found that certain health care needs can be provided without the need for an in-person physical exam.  This service lets us provide the care you need with a video conversation.  If a prescription is necessary we can send it directly to your pharmacy.  If lab work is needed we can place an order for that and you can then stop by our lab to have the test done at a later time.    Video visits are billed at different rates depending on your insurance coverage.  Please reach out to your insurance provider with any questions.    If during the course of the call the physician/provider feels a video visit is not appropriate, you will not be charged for this service.\"    Patient has given verbal consent for Video visit? Yes    How would you like to obtain your AVS? [facility]    Patient would like the video invitation sent by: Text to cell phone: [phone]    Will anyone else be joining your video visit? No      Subjective     Chelo Garcia is a 25 year old female who presents today via video visit for the following health issues:    HPI  Depression and Anxiety Follow-Up    How are you doing with your depression since your last visit? No change    How are you doing with your anxiety since your last visit?  No change    Are you having other symptoms that might be associated with depression or anxiety? No    Have you had a significant life event? No     Do you have any concerns with your use of alcohol or other drugs? No    Social History     Tobacco Use     Smoking status: Former Smoker     Types: Cigarettes     Last attempt to quit: 2019     Years since quittin.0     Smokeless tobacco: Former User     Quit date: 10/12/2018   Substance Use Topics     Alcohol use: Yes     " Comment: Socially, once a week     Drug use: Yes     Types: Marijuana     Comment: Once a month     PHQ 8/22/2019 12/26/2019 5/19/2020   PHQ-9 Total Score 1 7 6   Q9: Thoughts of better off dead/self-harm past 2 weeks Not at all Not at all Not at all   F/U: Thoughts of suicide or self-harm - - -   F/U: Self harm-plan - - -   F/U: Self-harm action - - -   F/U: Safety concerns - - -     CHASE-7 SCORE 8/22/2019 12/26/2019 5/19/2020   Total Score - 2 (minimal anxiety) -   Total Score 0 2 3     Last PHQ-9 5/19/2020   1.  Little interest or pleasure in doing things 0   2.  Feeling down, depressed, or hopeless 0   3.  Trouble falling or staying asleep, or sleeping too much 3   4.  Feeling tired or having little energy 3   5.  Poor appetite or overeating 0   6.  Feeling bad about yourself 0   7.  Trouble concentrating 0   8.  Moving slowly or restless 0   Q9: Thoughts of better off dead/self-harm past 2 weeks 0   PHQ-9 Total Score 6   Difficulty at work, home, or with people Not difficult at all   In the past two weeks have you had thoughts of suicide or self harm? -   Do you have concerns about your personal safety or the safety of others? -   In the past 2 weeks have you thought about a plan or had intention to harm yourself? -   In the past 2 weeks have you acted on these thoughts in any way? -     CHASE-7  5/19/2020   1. Feeling nervous, anxious, or on edge 1   2. Not being able to stop or control worrying 1   3. Worrying too much about different things 1   4. Trouble relaxing 0   5. Being so restless that it is hard to sit still 0   6. Becoming easily annoyed or irritable 0   7. Feeling afraid, as if something awful might happen 0   CHASE-7 Total Score 3   If you checked any problems, how difficult have they made it for you to do your work, take care of things at home, or get along with other people? Not difficult at all     In the past two weeks have you had thoughts of suicide or self-harm?  No.    Do you have concerns  about your personal safety or the safety of others?   No    Suicide Assessment Five-step Evaluation and Treatment (SAFE-T)    Video Start Time: 949     BACKGROUND  Smoker, marijuana use, BMI now > 32, hx of CHASE , MDD on sertraline, Wellbutrin , previously trazodone and atarax prn, resolved low back pain, on combined OCP, history of prior induced  D&C in 2019, resolved proteinuria,     Hx of BMI 30, chronic mild low back pain intermittently, CHASE on atarax prn not used, MDD on sertraline, resolved proteinuria, on microgestin BCP, seen 18 for a physical. At that time declined meds, to speak to Konrad & referred for CBT.cbc, UA, CMP, TSH was normal. LDL was mildly elevated & wet prep showed BV & given metro gel. Std testing was all negative but not immune to hepatitis B. Advised revaccination but didn't come in. Called and given metronidazole & diflucan in feb & symptoms of BV finally resolved. Sen then by gyn  & put on BCP.  seen for external hemorrhoids that are not bothering her too much currently.  seen finally by psychology. Reports no alcohol or drug use. pap negative  due in ,  Uses OTC sleeping aid  to sleep, takes 2 pills every other day. nausea once the other day, may have been related to anxiety. Seen 18 started on sertraline , given atarax prn, continued on CBT and advised Vit D. Seen 6/15/18 Continued with chronic passive suicidal thoughts. No active plan. Not any worse. Has a Safety plan in place /crisis plan in place with therapist. Contracts to safety. Friends and sig other are supportive. Was  using Unisom about 2 pills a day. Denied use of alcohol or drugs when I saw her. Was seeing a therapist but due to expense was down to only once a month.  Given revaccination of Hep B as not immune. Increased sertraline to 75 mg daily. Seen  by Counsellor, reviewed safety plan, noted self cutting, unable to afford counseling, given resources for free counseling. Not  looked into it yet.  seen 7/9 , got a cat , doing much better, sertraline increased to 100 mg.  Seen mid august and was doing well and advised follow up in 2 months. seen 9/20 for worsening depression and chronic passive suicidal thought and sertraline increased to 150, started on trazodone at bedtime and continued on atarax prn. Referred back to Counsellor and also to consider psyche. And given the flu shot. Seen 10/2/18 and noted mildly improved, advised a SAD lamp and continued same. Called few weeks later for psyche referral and given. Seen by Gabriela gonzalez 10/31/18. Notes not available. Sertraline increased to 200 mg by psyche. It really helped her mind but it made her v nauseous. Did not return to psyche. No script given.  Stopped sertraline due to nausea in jan 2019 Nausea stopped since meds discontinued. Mood was good till stopped meds then scores high.  Had passive thoughts of death. No active plan. Declined  counseling.  Was doing CBT on line on [email or web address] & noted it was going well, was journaling too.  Taking atarax recently prn. On trazodone up to 3 days a week. Drinking less alcohol a glass of wine at a meal 2 a week. Seen 2/1/19 with worsened mood as off med 1.5 wk ( had  stopped due to nausea form med). Noted chronic intermittent passive thought of suicide. No active plan, had a safety plan in place and contracted to safety. Resumed sertraline at previous dose of 150 mg which she tolerated in the past & added Wellbutrin to help with mood. Was to continue with online CBT through [employer]. Advised to Increase aerobic exercise. & return back in 3 weeks to adjust further. 3rd Hep B given.      Not seen till 4/1/2019. Noted missed apt's due to illness and then didn't feel like getting out of bed. Had chronic passive suicidal thoughts initially worse then  better. Never acted on them and did  not plan to too. Lives with room mate and cat dooby. Works one job. Close to maternal grandparents.  She had broken up with her boyfriend, her Maternal grandfather was not doing well due to chronic illness. Mother who she reported as white had bipolar, & Had not had a good relationship with her, dad was from [facility], on parole for minor misdemeanor, & to get deported in the following 40 days due to immigration issues, back to [facility]. She has a good friend and also roommate who were her supports. Had a safety plan in place. Felt the med changes had helped then life events occurred. Was doing online counseling and journaling. Noted had had 2 panic attacks in the prior three weeks and used atarax daily in the prior 2 weeks. Noted more night sweats even before Wellbutrin added. Was at times taking 2 of trazodone 50 mg for sleep. Denied alcohol use recently or drugs and at most drank caffeine 4 times a week, once a day. Had never had hypomanic or manic symptoms. Contracted to safety and part of safety plan was in returning on 4/18 for recheck with physical. Was agreeable to med adjustment in Wellbutrin up to 200 mg to see if would help. Continued sertraline 150 mg daily. Atarax 25 mg every 8 hrs as needed for anxiety, then to wean off as anxiety improved. Continued trazodone 50 mg bedtime. Advised to go to the ER if worse. To avoid alcohol.   Called on 4/2 stating she was a little shaky and thought it was from the Wellbutrin, no seizures noted advised to decrease Wellbutrin to 1-1/2 tablet and continue rest of meds and to seek care with a psychiatrist.  Unable to see psychiatrist as could not  afford it , felt shakiness improved and mood was stable.   Seen 4/18 for a physical and pap, continued on meds, had chronic passive suicidal ideation, no active plan, contracted to safety. Birth control refilled after discussing side effects and advised follow up in one month. Was Rx for BV with Metrogel. Was negative for GC, syphilis, TSH, hep C, HIV and cbc were normal. LDL was elevated. Had an e visit 8/2019 and med refilled 3  months, remained with passive SI on Q 9, did not come in due to insurance issues and looks like might have run of meds a while. Not seen since 2019.  Seen 19Seen after many months due to loss of insurance.  Noted then her 10 yr old   maternal sister aspirated on motrin right before  and  . her 1/2 sister on dads side was still alive. Her Dad had not been not deported yet  but still working to prevent that. She had had a surgical D & C for  2 weeks prior & had run out of meds as couldn't afford them though had had refills on her BCP. Noted had gone two months off depression and anxiety meds. Despite all of that felt better, declined counseling and psyche referral. Noted was going to grieving group with mom. Relationship with mom noted better. Noted was still smoking and doing cannabis. Reported hardly any alcohol 1 glass of wine once a week. Was living with same roommate and cat. No longer with boyfriend. She declined need for atarax or trazodone & desired same previous dosing of sertraline and Wellbutrin.   Despite significant recent losses and situations and being off her meds 2 months seems to be doing pretty good.  Desired though to return back to sertraline and Wellbutrin.  Declined need for trazodone or Atarax.  Offered counseling but declined was doing grief counseling with her mother who she now had a better relationship with and her dad had not been deported yet and they were trying to work to keep him here.  She was no longer with her boyfriend and had a steady job and continued with her roommate and a cat.  Declined need to see a psychiatrist yet.  Restarted sertraline 150 mg and Wellbutrin 150 mg daily.  Advised to ease into them starting with half tablet each for a few days then 1 tablet and then up to the full strength of 1-1/2 tablet each. Monitor for side effects.  If mood got worse on medication may need to re-assess if actually needs both meds or just 1 or just  counseling or see a psychiatrist if not better. Encouraged to avoid smoking and cannabis. Advised these increase risk of blood clots and stroke with birth control pill use especially. Recommended  flu shot yearly & due to smoking hx also recommend pneumonia vaccine.  These were given that day.  Reminded that she had enough Birth control refills on file at her pharmacy from before till April 2020 when due again for a physical.  Reminded to always use protection. [place] in [place].    CURRENTLY   Called and initially no answer at this in my neck schedule patient seen by the time I got to her it was 949.  Reports depression and anxiety are stable desires to continue on same dose of medication has been taking 1-1/2 tablet of uncontrolled 100 and Zoloft 100 take over 150 mg each daily.  The only trouble she has is with sleeping has trouble falling asleep but still able to get up at 5 in the morning.  Declines need for trazodone.  Would like to do it naturally.  Does drink a lot of tea during the daytime.  Notes has gained a lot of weight from working at home during the current pandemic.  Planning to start exercising with her boyfriend who she is back with.  Declines need for a counselor cannot afford it and has tried online help before and got some skills.  She feels she does not need one currently.  Here for preventive physical but that will have to be deferred till the pandemic is better.  Needs a refill  on her birth control.  Has cut back on smoking and alcohol and minimized marijuana use.  Currently no fever or chills, no headache or dizziness, no double or blurry vision, no facial pain, earache, sore throat, runny nose, post nasal drip, no trouble hearing, smelling, tasting or swallowing, no cough , no chest pain, trouble breathing or palpitations, No abdominal pain, heart burn, reflux, nausea or vomiting or diarrhea or constipation, no blood in stools or black stools, no weight loss or night sweats. No  dysuria, hematuria, frequency, urgency, hesitancy, incontinence, No pelvic complaints. No leg swelling or joint pain. No rash.    Patient Active Problem List   Diagnosis     BMI 28.0-28.9,adult     Major depressive disorder, recurrent episode, severe (H)     Generalized anxiety disorder     Psychophysiological insomnia     Current occasional smoker     Cannabis abuse, episodic use     Past Surgical History:   Procedure Laterality Date     AS INDUCED ABORTN BY D&C  2019       Social History     Tobacco Use     Smoking status: Former Smoker     Types: Cigarettes     Last attempt to quit: 2019     Years since quittin.0     Smokeless tobacco: Former User     Quit date: 10/12/2018   Substance Use Topics     Alcohol use: Yes     Comment: Socially, once a week     Family History   Problem Relation Age of Onset     Ovarian Cancer Mother         first had cervical caner , 2016 got ovarian caner at age 38      Cervical Cancer Mother      Mental Illness Mother      Bipolar Disorder Mother      Breast Cancer Other          Current Outpatient Medications   Medication Sig Dispense Refill     buPROPion (WELLBUTRIN SR) 100 MG 12 hr tablet Take 1.5 tablets (150 mg) by mouth daily 45 tablet 5     norethindrone-ethinyl estradiol (MICROGESTIN ) 1-20 MG-MCG tablet Take 1 tablet by mouth daily 84 tablet 1     sertraline (ZOLOFT) 100 MG tablet Take 1.5 tablets (150 mg) by mouth daily 45 tablet 5     No Known Allergies  Recent Labs   Lab Test 19  0857 18  1028   A1C  --  5.1   * 127*   HDL 60 68   TRIG 74 98   ALT 18 16   CR 0.86 0.75   GFRESTIMATED >90 >90   GFRESTBLACK >90 >90   POTASSIUM 4.5 4.1   TSH 0.69 0.96      BP Readings from Last 3 Encounters:   19 124/70   19 120/74   19 144/66    Wt Readings from Last 3 Encounters:   20 87.5 kg (193 lb)   19 78.6 kg (173 lb 4 oz)   19 74.4 kg (164 lb)                    Reviewed and updated as needed this visit by  "Provider  Tobacco  Allergies  Meds  Med Hx  Surg Hx  Fam Hx  Soc Hx        Review of Systems   Constitutional, HEENT, cardiovascular, pulmonary, GI, , musculoskeletal, neuro, skin, endocrine and psych systems are negative, except as otherwise noted.      Objective    Ht 1.638 m (5' 4.5\")   Wt 87.5 kg (193 lb)   LMP 05/09/2020   BMI 32.62 kg/m    Estimated body mass index is 32.62 kg/m  as calculated from the following:    Height as of this encounter: 1.638 m (5' 4.5\").    Weight as of this encounter: 87.5 kg (193 lb).  Physical Exam     GENERAL: healthy, alert, no distress and obese  EYES: Eyes grossly normal to inspection.  No discharge or erythema, or obvious scleral/conjunctival abnormalities.  RESP: No audible wheeze, cough, or visible cyanosis.  No visible retractions or increased work of breathing.    SKIN: Visible skin clear. No significant rash, abnormal pigmentation or lesions.  NEURO: Cranial nerves grossly intact.  Mentation and speech appropriate for age.  PSYCH: Mentation appears normal, affect normal/bright, judgement and insight intact, normal speech and appearance well-groomed.      Diagnostic Test Results:  Labs reviewed in Epic  none         Assessment & Plan       ICD-10-CM    1. Severe episode of recurrent major depressive disorder, without psychotic features (H)  F33.2 sertraline (ZOLOFT) 100 MG tablet     buPROPion (WELLBUTRIN SR) 100 MG 12 hr tablet   2. Generalized anxiety disorder  F41.1 sertraline (ZOLOFT) 100 MG tablet     buPROPion (WELLBUTRIN SR) 100 MG 12 hr tablet   3. Encounter for contraceptive management, unspecified type  Z30.9 norethindrone-ethinyl estradiol (MICROGESTIN 1/20) 1-20 MG-MCG tablet     Currently doing well and desires to continue the same dose.  Continue Wellbutrin 100 mg 1-1/2 tablet daily and sertraline 100 mg tablet 1-1/2 tablet daily to equal 150 mg daily of each. Continue all medications, I gave you enough for the next 6 months.  Avoid smoking, " cannabis and limit alcohol.  It is great she is going to start exercise that will help her sleep better at night  May try valerian root at night to help fall asleep. Sleep hygiene discussed briefly & information given in instructions  May also need to train her mind and brain to sleep earlier slowly over time.  Currently declines need for trazodone.  Currently declines need for counselor or psychiatrist.  Birth control tolerated no side effects no glaring contraindications.  Advised to avoid smoking.  See back in 3 to 6 months depending on how the situation is and how you are doing.      Work on weight loss  Regular exercise  See Patient Instructions    Return in about 6 months (around 11/19/2020) for Physical Exam with PCP.    Andira Pearce MD  [facility]    Video-Visit Details    Type of service:  Video Visit    Video End Time:959    Originating Location (pt. Location): Home    Distant Location (provider location):  [facility]     Platform used for Video Visit: Dianna    Return in about 6 months (around 11/19/2020) for Physical Exam with PCP.       Andria Pearce MD

## 2020-05-19 NOTE — PATIENT INSTRUCTIONS
I am glad you are feeling well.  Continue all medications, I gave you enough for the next 6 months.  Avoid smoking, cannabis and limit alcohol.  It is great you are going to start exercise that will help you sleep better at night  May try valerian root at night to help fall asleep.  May also need to train your mind and brain to sleep earlier slowly over time.  See you back in 3 to 6 months depending on how the situation is and how you are doing.    General recommendations for sleep problems (Insomnia)  Allow 2-4 weeks to see results     Establish a regular sleep schedule   Go to bed at same time each night, get up at same time each day, avoid sleeping-in on weekends.  Cut down time in bed (if not asleep, get up, go in other room, do something calming and boring such as sorting papers, making warm milk, knitting, dusting)   Avoid trying to force yourself to sleep.  Use your bed only for sleep. Do not read or watch Television in bed.     Make the bedroom comfortable  Keepthe temperature in your bedroom comfortable, keep bedroom quiet when sleeping, and consider ear plugs (silicon) especially if you have partner who snores.  Keep bedroom dark enough:  use dark blinds or wear an eye mask if needed.    Relax at bedtime.    Do not watch tv or play video games or surf on computer right before bed; the full spectrum light actually stimulates your brain!  Relax each muscle group individually ; begin with your feet, work toward your head. Deal with your worries before bedtime by setting aside a worry time for 30 minutes earlier or journal your thoughts.  Listen to relaxation tapes such as Classical Music or Nature sounds or imagine a tranquil scene (e.g. waterfall or beach)   Back Massage : Gentle 5-minute back rub prior to bedtime can help relax you.    Perform measures to make you tired at bedtime.   Get regular Exercise each day (at least 6 hours before bedtime)   Take medications only as directed   Eat a light bedtime  snack or warm drink, such as milk or herbal tea (non-caffeinated)   No Napping during day     Stimulus Control   Do not lie awake for more than 30 minutes.   Get out of bed and perform a quiet activity, then return to bed when sleepy.  Repeat as many times per night as needed     Things to avoid   Do not Exercise just before bedtime   No overstimulating activities just before bed, such as competitive games or exciting Television programs  Avoid caffeine (coffee, tea, soda), chocolate after lunchtime   Do not use alcohol to induce sleep (worsens Insomnia)   Do not take someone else's sleeping pills   Do not look at the clock when awakening   Do not turn on light when getting up to use bathroom     Instructions for treating Delayed Sleep Phase Syndrome:    Delayed Sleep Phase Syndrome (DSPS) means that your body's internal timing is set late compared to the 24 hour day. Therefore, it is often difficult to get up on time for work in the morning and sometimes difficult to fall asleep on time, in order to get enough sleep. People with DSPS often tend to like to stay up late on weekends and sleep in until between 10 AM and noon, sometimes even later.This is actually a bad habit that will perpetuate the problem. It reinforces your body's tendency to be on that later schedule.    You should go to bed when you are sleepy and ready to sleep. During this entire process, you should not engage in activities that may make it worse, such as watching TV in bed, leaving the TV on all night, drinking any caffeine 6 hours before bed or exercising 1-2 hours before bed.     Start taking Melatonin, 1 mg tablet 3-5 hours before the time that you fall asleep on average (not your desired bedtime or time that you get in bed, but the time you normally fall asleep on your own).      Upon awakening, get exposure to sun-light for about 30-45 minutes. You do not need to look at the sun, in fact, this is dangerous. Reading the paper with the sun  shining on you is adequate.  Alternatively, you may use a Seasonal Affective Disorder Lamp (intensity 10,000 Lux) instead of the sun. The lamp should be positioned 1-2 arms lengths away from you. They lamps are sold at [employer] such as [employer], [employer] or [employer]. A prescription can be written to get insurance coverage in some cases. They are also sold on [employer].com.    Using the light and melatonin should help march your internal clock (known as your circadian rhythms) gradually earlier. As your bedtime advances, remember to take your melatonin earlier, keeping it 5 hours before your fall asleep time.    Avoid naps and sleeping in because sleeping during the day will delay your body's clock and you will have to start from scratch.     More information about light therapy:  If the cost of any light box is too much, you can also purchase a compact fluorescent all spectrum light bulb at a local hardware store for about $8.  The most commonly available bulb is 1400 lumen.  You would need two of these positioned within 1 meter of yourself to be equivalent to 2,500 lux.  The bulbs can be placed in a standard light fixture.  Additionally, they can be placed in a mountable fixture that is used in greenhouses.  Mountable fixtures are also available at hardware stores for about $9.  Do not look directly at the light.  If you have any concerns regarding the safety of bright light therapy for you, it is recommended that you consult an ophthalmologist before using a light box.  If you have a condition that makes your eyes very sensitive to light, macular degeneration, a family history of such problems, or diabetic changes to your eyes, consult an ophthalmologist before using a light box. If you have anxiety disorder and have an increase in anxiety discontinue use.  Make a relaxing routine prior to bedtime  Relaxation exercises:              Progressive muscle relaxation: Relax each muscle group  individually                Begin with your feet, flex, then relax. Try to imagine your feet feeling heavy and sinking into the bed. Move to your calves, do the same thing. Work through each muscle group toward your head.              Relaxing Mental Imagery: Try to imagine a trip that you took and found relaxing, or imagine a day at the beach. Try to walk yourself through the day in your mind as if you were dreaming it. Try to imagine sensing the different experiences, such as feeling sand between your toes, the heat of the sun on your skin, seeing the waves crashing the shore, the smell of the salt water, etc.     Deal with your worries before bedtime                Set aside a worry time around dinner time for 10-15 minutes. Write down the                things that are on your mind. Plan time in the coming days to address those                issues. Brainstorm ideas on how you will deal with them. Try to identify issues                that are out of your control, and try to let those issues go.  Listen to relaxation tapes    Classical Music or Nature sounds    Back Massage    Get regular exercise each day (at least 1-2 hours before bedtime)    Take medications only as directed    Eat a light bedtime snack or warm drink    Warm milk    Warm herbal tea (non-caffeinated)       Things to avoid    No overstimulating activities just before bed    No competitive games before bedtime    No exciting television programs before bedtime    Avoid caffeine after lunchtime    Avoid chocolate    Do not use alcohol to induce sleep (worsens Insomnia)    Do not take someone else's sleeping pills    Do not look at the clock when awakening    Do not turn on light when getting up to use bathroom, use a nightlight     Online Programs     www.SHUTi.me (pronounced shut eye). There is a fee for this program. Enter the code  [employer]  if you decide to enroll in this program.        www.sleepIO.com (pronounced sleep ee oh). There is a  fee for this program. Enter the code  [employer]  if you decide to enroll in this program.    Suggested Resources  Insomnia Treatment Books      Overcoming Insomnia by Mahamed Goncalves and Katiuska Patel (2008)    No More Sleepless Nights by Wilner Meraz and Rowena Earl (1996)    Say Ester to Insomnia by Luis Fisher (2009)    The Insomnia Workbook by Veronica Gardner and Drake Marcus (2009)    The Insomnia Answer by Ramses Marshall and Gautam Dumont (2006)      Stress Management and Relaxation Books    The Relaxation and Stress Reduction Workbook by Anushka Roth, Shanna Westbrook and Ottoniel Ramirez (2008)    Stress Management Workbook: Techniques and Self-Assessment Procedures by Edna Coats and Viraj Lu (1997)    A Mindfulness-Based Stress Reduction Workbook by Nick Villarreal and Ellie Weinberg (2010)    The Complete Stress Management Workbook by Alberto Snow, William Guillermo and Torres León (1996)    Assert Yourself by Francheska Leo and Aldo Leo (1977)    Relaxation Resources for Computer Download   These websites offer resources to help you relax. This list is for information only. [facility] is not responsible for the quality of services or the actions of any person or organization.  Progressive Muscle Relaxation (PMR):      http://www.[employer]progressive-muscle-relaxation-exercise.html    http://studentsupport.[facility]/counseling/resources/self-help/relaxation-and-stress-management/  Deep Breathing Exercises:    http://www.[employer]breathing-awareness.html    Meditation:         www.[employer]    www.theT[employer] Searchguided-meditation-site.com You may have to pay for some of these resources.    Guided Imagery:    http://www.[employer]guided-imagery-scripts.html      http://[employer]/library/qirluwoupm-zkynue-tmwzaou/    Counseling / Behavioral Health  [facility]  Visit [email or web address]  or call [phone] to find a clinic close to you.  Or call [phone] for [facility].

## 2020-05-20 ASSESSMENT — ANXIETY QUESTIONNAIRES: GAD7 TOTAL SCORE: 3

## 2020-09-06 ENCOUNTER — MYC MEDICAL ADVICE (OUTPATIENT)
Dept: FAMILY MEDICINE | Facility: CLINIC | Age: 25
End: 2020-09-06

## 2020-11-07 ENCOUNTER — HEALTH MAINTENANCE LETTER (OUTPATIENT)
Age: 25
End: 2020-11-07

## 2020-11-29 ENCOUNTER — MYC MEDICAL ADVICE (OUTPATIENT)
Dept: FAMILY MEDICINE | Facility: CLINIC | Age: 25
End: 2020-11-29

## 2020-11-29 DIAGNOSIS — Z32.01 PREGNANCY TEST POSITIVE: Primary | ICD-10-CM

## 2020-11-30 NOTE — TELEPHONE ENCOUNTER
OB referral signed  She should discuss psyche meds with ob to take over care and or see psychiatrist

## 2020-12-14 ENCOUNTER — PRENATAL OFFICE VISIT (OUTPATIENT)
Dept: NURSING | Facility: CLINIC | Age: 25
End: 2020-12-14
Payer: COMMERCIAL

## 2020-12-14 DIAGNOSIS — Z34.90 SUPERVISION OF NORMAL PREGNANCY: Primary | ICD-10-CM

## 2020-12-14 PROCEDURE — 99207 PR NO CHARGE NURSE ONLY: CPT

## 2020-12-14 RX ORDER — PRENATAL VIT/IRON FUM/FOLIC AC 27MG-0.8MG
1 TABLET ORAL DAILY
COMMUNITY
End: 2021-10-05

## 2020-12-14 SDOH — ECONOMIC STABILITY: TRANSPORTATION INSECURITY
IN THE PAST 12 MONTHS, HAS THE LACK OF TRANSPORTATION KEPT YOU FROM MEDICAL APPOINTMENTS OR FROM GETTING MEDICATIONS?: NO

## 2020-12-14 SDOH — HEALTH STABILITY: MENTAL HEALTH: HOW OFTEN DO YOU HAVE A DRINK CONTAINING ALCOHOL?: NOT ASKED

## 2020-12-14 SDOH — ECONOMIC STABILITY: FOOD INSECURITY: WITHIN THE PAST 12 MONTHS, YOU WORRIED THAT YOUR FOOD WOULD RUN OUT BEFORE YOU GOT MONEY TO BUY MORE.: NEVER TRUE

## 2020-12-14 SDOH — ECONOMIC STABILITY: FOOD INSECURITY: WITHIN THE PAST 12 MONTHS, THE FOOD YOU BOUGHT JUST DIDN'T LAST AND YOU DIDN'T HAVE MONEY TO GET MORE.: NEVER TRUE

## 2020-12-14 SDOH — HEALTH STABILITY: MENTAL HEALTH: HOW MANY STANDARD DRINKS CONTAINING ALCOHOL DO YOU HAVE ON A TYPICAL DAY?: NOT ASKED

## 2020-12-14 SDOH — ECONOMIC STABILITY: TRANSPORTATION INSECURITY
IN THE PAST 12 MONTHS, HAS LACK OF TRANSPORTATION KEPT YOU FROM MEETINGS, WORK, OR FROM GETTING THINGS NEEDED FOR DAILY LIVING?: NO

## 2020-12-14 SDOH — ECONOMIC STABILITY: INCOME INSECURITY: HOW HARD IS IT FOR YOU TO PAY FOR THE VERY BASICS LIKE FOOD, HOUSING, MEDICAL CARE, AND HEATING?: NOT HARD AT ALL

## 2020-12-14 SDOH — HEALTH STABILITY: MENTAL HEALTH: HOW OFTEN DO YOU HAVE 6 OR MORE DRINKS ON ONE OCCASION?: NOT ASKED

## 2020-12-14 NOTE — PROGRESS NOTES
NPN nurse visit done over the phone. Pt will be given NPN folder and book at her upcoming appt.   Discussed optional screening available to assess chromosomal anomalies. Questions answered. Pt advised to call the clinic if she has any questions or concerns related to her pregnancy. Prenatal labs will be obtained at her upcoming appt. New prenatal visit scheduled on 1/11/21 with Dr Calvin Inman.    Last PAP 4/18/19-normal    Lab Results   Component Value Date    PAP NIL 04/18/2019           Patient supplied answers from flow sheet for:  Prenatal OB Questionnaire.  Past Medical History  Diabetes?: (P) No  Hypertension : (P) No  Heart disease, mitral valve prolapse or rheumatic fever?: (P) No  An autoimmune disease such as lupus or rheumatoid arthritis?: (P) No  Kidney disease or urinary tract infection?: (P) No  Epilepsy, seizures or spells?: (P) No  Migraine headaches?: (P) No  A stroke or loss of function or sensation?: (P) No  Any other neurological problems?: (P) No  Have you ever been treated for depression?: (!) Yes(On Wellbutrin and Zoloft)  Are you having problems with crying spells or loss of self-esteem?: (P) No  Have you ever required psychiatric care?: (P) No  Have you ever had hepatitis, liver disease or jaundice?: (P) No  Have you been treated for blood clots in your veins, deep vein thromosis, inflammation in the veins, thrombosis, phlebitis, pulmonary embolism or varicosities?: (P) No  Have you had excessive bleeding after surgery or dental work?: (P) No  Do you bleed more than other women after a cut or scratch?: (P) No  Do you have a history of anemia?: (P) No  Have you ever had thyroid problems or taken thyroid medication?: (P) No   Do you have any endocrine problems?: (P) No  Have you ever been in a major accident or suffered serious trauma?: (P) No  Within the last year, has anyone hit, slapped, kicked or otherwise hurt you?: (P) No  In the last year, has anyone forced you to have sex when you  didn't want to?: (P) No    Past Medical History 2   Have you ever received a blood transfusion?: (P) No  Would you refuse a blood transfusion if a doctor judged it to be medically necessary?: (P) No   If you answered Yes, would you rather die than receive a blood transfusion?: (P) No  If you answered Yes, is this for Gnosticist reasons?: (P) No  Does anyone in your home smoke?: (P) No  Do you use tobacco products?: (P) No  Do you drink beer, wine or hard liquor?: No  Do you use any of the following: marijuana, speed, cocaine, heroin, hallucinogens or other drugs?: (!) Yes(Marijuana prior to pregnancy)   Is your blood type Rh negative?: (P) No  Have you ever had abnormal antibodies in your blood?: (P) No  Have you ever had asthma?: (P) No  Have you ever had tuberculosis?: (P) No  Do you have any allergies to drugs or over-the-counter medications?: (P) No  Allergies: Dust Mites, Aspartame, Ethanol, Venlafaxine, Hydrochloride, Sertraline: (P) No  Have you had any breast problems?: (P) No  Have you ever ?: (P) No  Have you had any gynecological surgical procedures such as cervical conization, a LEEP procedure, laser treatment, cryosurgery of the cervix or a dilation and curettage, etc?: (P) No  Have you ever had any other surgical procedures?: (P) No  Have you been hospitalized for a nonsurgical reason excluding normal delivery?: (P) No  Have you ever had any anesthetic complications?: (P) No  Have you ever had an abnormal pap smear?: (P) No    Past Medical History (Continued)  Do you have a history of abnormalities of the uterus?: (P) No  Did your mother take SARAH or any other hormones when she was pregnant with you?: (P) No  Did it take you more than a year to become pregnant?: (P) No  Have you ever been evaluated or treated for infertility?: (P) No  Is there a history of medical problems in your family, which you feel may be important to this pregnancy?: No  Do you have any other problems we have not asked  about which you feel may be important to this pregnancy?: (P) No    Symptoms since last menstrual period  Do you have any of the following symptoms: abdominal pain, blood in stools or urine, chest pain, shortness of breath, coughing or vomiting up blood, your heart racing or skipping beats, nausea and vomiting, pain on urination or vaginal discharge or bleed: (P) No  Current medications, including over-the-counter medications, you are using? (If not applicable answer none): (P) Bupropion and sertraline  Will the patient be 35 years old or older at the time of delivery?: (P) No    Has the patient, baby's father or anyone in either family had:  Thalassemia (Italian, Greek, Mediterranean or  background only) and an MCV result less than 80?: (P) No  Neural tube defect such as meningomyelocele, spina bifida or anencephaly?: (P) No  Congenital heart defect?: (P) No  Down's Syndrome?: (P) No  Toro-Sachs disease (Yazdanism, Cajun, Citizen of [place])?: (P) No  Sickle cell disease or trait ()?: (!) Yes(FOB family has hx on fathers side, unsure who)  Hemophilia or other inherited problems of blood?: (P) No  Muscular dystrophy?: (P) No  Cystic fibrosis?: (P) No  Alyce's chorea?: (P) No  Mental retardation/autism?: (P) No  If yes, was the person tested for fragile X?: (P) Unknown  Any other inherited genetic or chromosomal disorder?: (P) No  Maternal metabolic disorder (e.g Insulin-dependent diabetes, PKU)?: (P) No  A child with birth defects not listed above?: (P) No  Recurrent pregnancy loss or stillbirth?: (P) No   Has the patient had any medications/street drugs/alcohol since her last menstrual period?: (P) No  Does the patient or baby's father have any other genetic risks?: (P) No    Infection History   Do you object to being tested for Hepatitis B?: (P) No  Do you object to being tested for HIV?: (P) No   Do you feel that you are at high risk for coming in contact with the AIDS virus?: (P) No  Have you ever been  treated for tuberculosis?: (P) No  Have you ever had a positive skin test for tuberculosis?: (P) No  Do you live with someone who has tuberculosis?: (P) No  Have you ever been exposed to tuberculosis?: (P) No  Do you have genital herpes?: (P) No  Does your partner have genital herpes?: (P) No  Have you had a viral illness since your last period?: (P) No  Have you ever had gonorrhea, chlamydia, syphilis, venereal warts, trichomoniasis, pelvic inflammatory disease or any other sexually transmitted disease?: (P) No  Do you know if you are a genital group B streptococcus carrier?: (P) Unknown  Have you had chicken pox/varicella?: (P) No   Have you been vaccinated against chicken Pox?: (!) (P) Yes  Have you had any other infectious diseases?: (P) No

## 2021-01-04 DIAGNOSIS — Z34.90 SUPERVISION OF NORMAL PREGNANCY: ICD-10-CM

## 2021-01-04 LAB
ABO + RH BLD: NORMAL
ABO + RH BLD: NORMAL
ALBUMIN UR-MCNC: NEGATIVE MG/DL
APPEARANCE UR: CLEAR
BILIRUB UR QL STRIP: NEGATIVE
BLD GP AB SCN SERPL QL: NORMAL
BLOOD BANK CMNT PATIENT-IMP: NORMAL
COLOR UR AUTO: YELLOW
ERYTHROCYTE [DISTWIDTH] IN BLOOD BY AUTOMATED COUNT: 14.3 % (ref 10–15)
GLUCOSE UR STRIP-MCNC: NEGATIVE MG/DL
HBV SURFACE AG SERPL QL IA: NONREACTIVE
HCT VFR BLD AUTO: 37.2 % (ref 35–47)
HGB BLD-MCNC: 11.9 G/DL (ref 11.7–15.7)
HGB UR QL STRIP: NEGATIVE
HIV 1+2 AB+HIV1 P24 AG SERPL QL IA: NONREACTIVE
KETONES UR STRIP-MCNC: NEGATIVE MG/DL
LEUKOCYTE ESTERASE UR QL STRIP: NEGATIVE
MCH RBC QN AUTO: 26.9 PG (ref 26.5–33)
MCHC RBC AUTO-ENTMCNC: 32 G/DL (ref 31.5–36.5)
MCV RBC AUTO: 84 FL (ref 78–100)
NITRATE UR QL: NEGATIVE
PH UR STRIP: 6.5 PH (ref 5–7)
PLATELET # BLD AUTO: 278 10E9/L (ref 150–450)
RBC # BLD AUTO: 4.42 10E12/L (ref 3.8–5.2)
SOURCE: NORMAL
SP GR UR STRIP: 1.01 (ref 1–1.03)
SPECIMEN EXP DATE BLD: NORMAL
T PALLIDUM AB SER QL: NONREACTIVE
UROBILINOGEN UR STRIP-ACNC: 0.2 EU/DL (ref 0.2–1)
WBC # BLD AUTO: 9.4 10E9/L (ref 4–11)

## 2021-01-04 PROCEDURE — 87086 URINE CULTURE/COLONY COUNT: CPT | Performed by: OBSTETRICS & GYNECOLOGY

## 2021-01-04 PROCEDURE — 36415 COLL VENOUS BLD VENIPUNCTURE: CPT | Performed by: OBSTETRICS & GYNECOLOGY

## 2021-01-04 PROCEDURE — 99000 SPECIMEN HANDLING OFFICE-LAB: CPT | Performed by: OBSTETRICS & GYNECOLOGY

## 2021-01-04 PROCEDURE — 81003 URINALYSIS AUTO W/O SCOPE: CPT | Performed by: OBSTETRICS & GYNECOLOGY

## 2021-01-04 PROCEDURE — 86762 RUBELLA ANTIBODY: CPT | Performed by: OBSTETRICS & GYNECOLOGY

## 2021-01-04 PROCEDURE — 76801 OB US < 14 WKS SINGLE FETUS: CPT | Performed by: FAMILY MEDICINE

## 2021-01-04 PROCEDURE — 87340 HEPATITIS B SURFACE AG IA: CPT | Performed by: OBSTETRICS & GYNECOLOGY

## 2021-01-04 PROCEDURE — 86780 TREPONEMA PALLIDUM: CPT | Mod: 90 | Performed by: OBSTETRICS & GYNECOLOGY

## 2021-01-04 PROCEDURE — 86900 BLOOD TYPING SEROLOGIC ABO: CPT | Performed by: OBSTETRICS & GYNECOLOGY

## 2021-01-04 PROCEDURE — 87389 HIV-1 AG W/HIV-1&-2 AB AG IA: CPT | Performed by: OBSTETRICS & GYNECOLOGY

## 2021-01-04 PROCEDURE — 86901 BLOOD TYPING SEROLOGIC RH(D): CPT | Performed by: OBSTETRICS & GYNECOLOGY

## 2021-01-04 PROCEDURE — 85027 COMPLETE CBC AUTOMATED: CPT | Performed by: OBSTETRICS & GYNECOLOGY

## 2021-01-04 PROCEDURE — 86850 RBC ANTIBODY SCREEN: CPT | Performed by: OBSTETRICS & GYNECOLOGY

## 2021-01-05 LAB — RUBV IGG SERPL IA-ACNC: 12 IU/ML

## 2021-01-06 LAB
BACTERIA SPEC CULT: NORMAL
Lab: NORMAL
SPECIMEN SOURCE: NORMAL

## 2021-01-11 ENCOUNTER — PRENATAL OFFICE VISIT (OUTPATIENT)
Dept: OBGYN | Facility: CLINIC | Age: 26
End: 2021-01-11
Payer: COMMERCIAL

## 2021-01-11 VITALS — DIASTOLIC BLOOD PRESSURE: 80 MMHG | BODY MASS INDEX: 35.15 KG/M2 | SYSTOLIC BLOOD PRESSURE: 128 MMHG | WEIGHT: 208 LBS

## 2021-01-11 DIAGNOSIS — Z23 NEED FOR PROPHYLACTIC VACCINATION AND INOCULATION AGAINST INFLUENZA: ICD-10-CM

## 2021-01-11 DIAGNOSIS — Z34.01 ENCOUNTER FOR SUPERVISION OF NORMAL FIRST PREGNANCY IN FIRST TRIMESTER: Primary | ICD-10-CM

## 2021-01-11 DIAGNOSIS — E66.812 CLASS 2 OBESITY WITHOUT SERIOUS COMORBIDITY WITH BODY MASS INDEX (BMI) OF 35.0 TO 35.9 IN ADULT, UNSPECIFIED OBESITY TYPE: ICD-10-CM

## 2021-01-11 PROCEDURE — 99N1100 PR STATISTIC VERIFI PRENATAL TRISOMY 21,18,13: Mod: 90 | Performed by: OBSTETRICS & GYNECOLOGY

## 2021-01-11 PROCEDURE — 90471 IMMUNIZATION ADMIN: CPT | Performed by: OBSTETRICS & GYNECOLOGY

## 2021-01-11 PROCEDURE — 90686 IIV4 VACC NO PRSV 0.5 ML IM: CPT | Performed by: OBSTETRICS & GYNECOLOGY

## 2021-01-11 PROCEDURE — 99000 SPECIMEN HANDLING OFFICE-LAB: CPT | Performed by: OBSTETRICS & GYNECOLOGY

## 2021-01-11 PROCEDURE — 99207 PR FIRST OB VISIT: CPT | Performed by: OBSTETRICS & GYNECOLOGY

## 2021-01-11 PROCEDURE — 36415 COLL VENOUS BLD VENIPUNCTURE: CPT | Performed by: OBSTETRICS & GYNECOLOGY

## 2021-01-11 NOTE — NURSING NOTE
"Chief Complaint   Patient presents with     Prenatal Care     11 weeks       Initial /80   Wt 94.3 kg (208 lb)   LMP 10/25/2020 (LMP Unknown)   BMI 35.15 kg/m   Estimated body mass index is 35.15 kg/m  as calculated from the following:    Height as of 20: 1.638 m (5' 4.5\").    Weight as of this encounter: 94.3 kg (208 lb).  BP completed using cuff size: regular    Questioned patient about current smoking habits.  Pt. has never smoked.          The following HM Due: NONE      "

## 2021-01-11 NOTE — PROGRESS NOTES
Chelo is a 25 year old  at 11w0d here for new ob visit.      Planned pregnancy.  FOB Raymond Kaplan.  She has two step kids, a step son age 3 and step daughter age 1.  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     H/o SAB x1 and TAB x1 with D&C.  H/o depression, currently managed well on wellbutrin and sertraline.   occ nausea, uses THC occasionally in pregnancy for it  Accepts flu vaccine today  Desires NIPT  Obesity BMI 35 today, pre preg BMI 32    OB History    Para Term  AB Living   3 0 0 0 2 0   SAB TAB Ectopic Multiple Live Births   1 0 0 0 0      # Outcome Date GA Lbr Santi/2nd Weight Sex Delivery Anes PTL Lv   3 Current            2 SAB            1 AB                ROS: Ten point review of systems was reviewed and negative except the above.    Past Medical History:   Diagnosis Date     Chronic midline low back pain without sciatica 2017     Depressive disorder 2017     Overweight 2017     Proteinuria     small protein on screening UA - recheck     Past Surgical History:   Procedure Laterality Date     AS INDUCED ABORTN BY D&C  2019     Patient Active Problem List    Diagnosis Date Noted     Current occasional smoker 2019     Priority: Medium     Cannabis abuse, episodic use 2019     Priority: Medium     Psychophysiological insomnia 2018     Priority: Medium     Major depressive disorder, recurrent episode, severe (H) 2018     Priority: Medium     Generalized anxiety disorder 2018     Priority: Medium     BMI 28.0-28.9,adult 2018     Priority: Medium      No Known Allergies       buPROPion (WELLBUTRIN SR) 100 MG 12 hr tablet, Take 1.5 tablets (150 mg) by mouth daily       Prenatal Vit-Fe Fumarate-FA (PRENATAL MULTIVITAMIN W/IRON) 27-0.8 MG tablet, Take 1 tablet by mouth daily       sertraline (ZOLOFT) 100 MG tablet, Take 1.5 tablets (150 mg) by mouth daily    No current facility-administered medications on file  prior to visit.       Physical Exam:   /80   Wt 94.3 kg (208 lb)   LMP 10/26/2020   BMI 35.15 kg/m      Gen:  no acute distress, comfortable, smiling  HENT: No scleral injection or icterus  CV: Regular rate and rhythm, no m/g/r  Resp: Normal work of breathing, no cough  GI: Abdomen soft, non-tender. No masses, organomegaly  Skin: No suspicious lesions or rashes  Psychiatric: mentation appears normal and affect bright    Doptones 160s  FH cwd    Lab Results   Component Value Date    ABO O 2021    RH Pos 2021       A/P 25 year old  at 11w0d here for NOB visit.  - Discussed physician coverage, tertiary support, diet, exercise, weight gain, schedule of visits, routine and indicated ultrasounds, and childbirth education.  Discussed MFM coverage and reviewed options for  testing in the first trimester.  Recommended PNV.  NOB labs and US reviewed.       Concerns:  - O+/RI.  FOB Savaun.  S/p flu shot today.  NIPT today.  - H/o depression, currently managed well on wellbutrin and sertraline.   - THC occasional use, reviewed risks to pregnancy and that a cord segment will be collected at time of delivery, recommend stopping.  - Obesity BMI 35 today, pre preg BMI 32.  Plan for level 2 US for anatomy scan    RTC 4 weeks    Courtney Inman MD, MPH  [facility]

## 2021-01-15 ENCOUNTER — TELEPHONE (OUTPATIENT)
Dept: OBGYN | Facility: CLINIC | Age: 26
End: 2021-01-15

## 2021-01-15 LAB — LAB SCANNED RESULT: NORMAL

## 2021-01-15 NOTE — TELEPHONE ENCOUNTER
Results received from [facility] testing in [facility].  Testing done:  Innatal Prenatal Screen    Action:  LM for pt to cb to report NORMAL results.    Gender:**BOY**  Will ask pt if they wish to know the gender.    Please route to provider as FYI once pt is informed.    Tiarra WYNNE RN

## 2021-01-22 ENCOUNTER — MYC MEDICAL ADVICE (OUTPATIENT)
Dept: OBGYN | Facility: CLINIC | Age: 26
End: 2021-01-22

## 2021-01-22 DIAGNOSIS — F41.1 GENERALIZED ANXIETY DISORDER: ICD-10-CM

## 2021-01-22 DIAGNOSIS — F33.2 SEVERE EPISODE OF RECURRENT MAJOR DEPRESSIVE DISORDER, WITHOUT PSYCHOTIC FEATURES (H): ICD-10-CM

## 2021-01-23 RX ORDER — SERTRALINE HYDROCHLORIDE 100 MG/1
150 TABLET, FILM COATED ORAL DAILY
Qty: 45 TABLET | Refills: 0 | Status: SHIPPED | OUTPATIENT
Start: 2021-01-23 | End: 2021-02-26

## 2021-01-23 RX ORDER — BUPROPION HYDROCHLORIDE 100 MG/1
150 TABLET, EXTENDED RELEASE ORAL DAILY
Qty: 45 TABLET | Refills: 0 | Status: SHIPPED | OUTPATIENT
Start: 2021-01-23 | End: 2021-02-26

## 2021-02-12 ENCOUNTER — PRENATAL OFFICE VISIT (OUTPATIENT)
Dept: OBGYN | Facility: CLINIC | Age: 26
End: 2021-02-12
Payer: COMMERCIAL

## 2021-02-12 ENCOUNTER — TRANSCRIBE ORDERS (OUTPATIENT)
Dept: MATERNAL FETAL MEDICINE | Facility: CLINIC | Age: 26
End: 2021-02-12

## 2021-02-12 VITALS — BODY MASS INDEX: 35.15 KG/M2 | WEIGHT: 208 LBS | SYSTOLIC BLOOD PRESSURE: 136 MMHG | DIASTOLIC BLOOD PRESSURE: 80 MMHG

## 2021-02-12 DIAGNOSIS — Z3A.20 20 WEEKS GESTATION OF PREGNANCY: Primary | ICD-10-CM

## 2021-02-12 DIAGNOSIS — F41.1 GENERALIZED ANXIETY DISORDER: ICD-10-CM

## 2021-02-12 DIAGNOSIS — Z34.80 SUPERVISION OF OTHER NORMAL PREGNANCY, ANTEPARTUM: ICD-10-CM

## 2021-02-12 DIAGNOSIS — Z13.1 SCREENING FOR DIABETES MELLITUS: ICD-10-CM

## 2021-02-12 DIAGNOSIS — O26.90 PREGNANCY RELATED CONDITION, ANTEPARTUM: Primary | ICD-10-CM

## 2021-02-12 PROCEDURE — 99207 PR PRENATAL VISIT: CPT | Performed by: OBSTETRICS & GYNECOLOGY

## 2021-02-12 PROCEDURE — 82950 GLUCOSE TEST: CPT | Performed by: OBSTETRICS & GYNECOLOGY

## 2021-02-12 PROCEDURE — 36415 COLL VENOUS BLD VENIPUNCTURE: CPT | Performed by: OBSTETRICS & GYNECOLOGY

## 2021-02-12 NOTE — NURSING NOTE
"Chief Complaint   Patient presents with     Prenatal Care     15 4/7 weeks       Initial /80   Wt 94.3 kg (208 lb)   LMP 10/26/2020   BMI 35.15 kg/m   Estimated body mass index is 35.15 kg/m  as calculated from the following:    Height as of 20: 1.638 m (5' 4.5\").    Weight as of this encounter: 94.3 kg (208 lb).  BP completed using cuff size: large    Questioned patient about current smoking habits.  Pt. has never smoked.          The following HM Due: NONE    Mary Aguayo CMA    "

## 2021-02-12 NOTE — PROGRESS NOTES
26 year old  at 15w4d     - O+/RI.  FOB Savaun.  S/p flu shot.  NIPT nl XY.  Reviewed quickening.   - H/o depression, currently managed well on wellbutrin and sertraline.   - Obesity BMI 35.  Plan for level 2 US for anatomy scan, early GCT today.  Recommend baby ASA to prevent preE (BP slightly above normal at 136/80 today)    RTC 4 wks     Courtney Inman MD, MPH  [facility]

## 2021-02-13 LAB — GLUCOSE 1H P 50 G GLC PO SERPL-MCNC: 125 MG/DL (ref 60–129)

## 2021-02-26 DIAGNOSIS — F41.1 GENERALIZED ANXIETY DISORDER: ICD-10-CM

## 2021-02-26 DIAGNOSIS — F33.2 SEVERE EPISODE OF RECURRENT MAJOR DEPRESSIVE DISORDER, WITHOUT PSYCHOTIC FEATURES (H): ICD-10-CM

## 2021-02-26 RX ORDER — SERTRALINE HYDROCHLORIDE 100 MG/1
150 TABLET, FILM COATED ORAL DAILY
Qty: 45 TABLET | Refills: 0 | Status: CANCELLED | OUTPATIENT
Start: 2021-02-26

## 2021-02-26 RX ORDER — BUPROPION HYDROCHLORIDE 100 MG/1
150 TABLET, EXTENDED RELEASE ORAL DAILY
Qty: 45 TABLET | Refills: 0 | Status: CANCELLED | OUTPATIENT
Start: 2021-02-26

## 2021-02-26 NOTE — TELEPHONE ENCOUNTER
buporpion      Last Written Prescription Date:  1/23/21  Last Fill Quantity: 45,   # refills: 0  Last Office Visit: 2/12/21    sertraline      Last Written Prescription Date:  1/23/21  Last Fill Quantity: 45,   # refills: 0  Last Office Visit: 2/12/21       Future Office visit:    Next 5 appointments (look out 90 days)    Mar 12, 2021  2:45 PM  ESTABLISHED PRENATAL with Courtney Inman MD  [facility] [address] [place]  [address] [place] [id number]  [phone]   Apr 12, 2021  3:15 PM  ESTABLISHED PRENATAL with Courtney Inman MD  [facility] [address] [place]  [address] [place] [id number]  [phone]   May 10, 2021 11:00 AM  ESTABLISHED PRENATAL with Courtney Inman MD  [facility] [address] [place]  [address] [place] [id number]  [phone]

## 2021-03-03 ENCOUNTER — PRE VISIT (OUTPATIENT)
Dept: MATERNAL FETAL MEDICINE | Facility: CLINIC | Age: 26
End: 2021-03-03

## 2021-03-05 ENCOUNTER — OFFICE VISIT (OUTPATIENT)
Dept: MATERNAL FETAL MEDICINE | Facility: CLINIC | Age: 26
End: 2021-03-05
Attending: OBSTETRICS & GYNECOLOGY
Payer: COMMERCIAL

## 2021-03-05 ENCOUNTER — HOSPITAL ENCOUNTER (OUTPATIENT)
Dept: ULTRASOUND IMAGING | Facility: CLINIC | Age: 26
End: 2021-03-05
Attending: OBSTETRICS & GYNECOLOGY
Payer: COMMERCIAL

## 2021-03-05 DIAGNOSIS — O26.90 PREGNANCY RELATED CONDITION, ANTEPARTUM: ICD-10-CM

## 2021-03-05 PROCEDURE — 76811 OB US DETAILED SNGL FETUS: CPT

## 2021-03-05 PROCEDURE — 76811 OB US DETAILED SNGL FETUS: CPT | Mod: 26 | Performed by: OBSTETRICS & GYNECOLOGY

## 2021-03-12 ENCOUNTER — PRENATAL OFFICE VISIT (OUTPATIENT)
Dept: OBGYN | Facility: CLINIC | Age: 26
End: 2021-03-12
Payer: COMMERCIAL

## 2021-03-12 VITALS — WEIGHT: 209 LBS | BODY MASS INDEX: 35.32 KG/M2 | DIASTOLIC BLOOD PRESSURE: 80 MMHG | SYSTOLIC BLOOD PRESSURE: 126 MMHG

## 2021-03-12 DIAGNOSIS — Z34.80 SUPERVISION OF OTHER NORMAL PREGNANCY, ANTEPARTUM: Primary | ICD-10-CM

## 2021-03-12 DIAGNOSIS — E66.812 CLASS 2 OBESITY WITHOUT SERIOUS COMORBIDITY WITH BODY MASS INDEX (BMI) OF 35.0 TO 35.9 IN ADULT, UNSPECIFIED OBESITY TYPE: ICD-10-CM

## 2021-03-12 PROCEDURE — 99207 PR PRENATAL VISIT: CPT | Performed by: OBSTETRICS & GYNECOLOGY

## 2021-03-12 RX ORDER — ASPIRIN 81 MG/1
81 TABLET ORAL DAILY
Status: ON HOLD | COMMUNITY
End: 2021-07-01

## 2021-03-12 NOTE — PROGRESS NOTES
26 year old  at 19w4d     - O+/RI.  FOB Savaun.  S/p flu shot.  NIPT nl XY.  nl early GCT.  Nl level 2 US, heart not well seen, has follow-up.  - H/o depression, currently managed well on wellbutrin and sertraline.   - Obesity BMI 35.  s/p level 2 US, on baby ASA.    RTC 4 wks     Courtney Inman MD, MPH  [facility]

## 2021-03-12 NOTE — NURSING NOTE
"Chief Complaint   Patient presents with     Prenatal Care     19 weeks       Initial /80   Wt 94.8 kg (209 lb)   LMP 10/26/2020   BMI 35.32 kg/m   Estimated body mass index is 35.32 kg/m  as calculated from the following:    Height as of 20: 1.638 m (5' 4.5\").    Weight as of this encounter: 94.8 kg (209 lb).  BP completed using cuff size: large    Questioned patient about current smoking habits.  Pt. has never smoked.          The following HM Due: NONE    Mary Aguayo, PABLO    "

## 2021-03-26 ENCOUNTER — HOSPITAL ENCOUNTER (OUTPATIENT)
Dept: ULTRASOUND IMAGING | Facility: CLINIC | Age: 26
End: 2021-03-26
Attending: OBSTETRICS & GYNECOLOGY
Payer: COMMERCIAL

## 2021-03-26 ENCOUNTER — OFFICE VISIT (OUTPATIENT)
Dept: MATERNAL FETAL MEDICINE | Facility: CLINIC | Age: 26
End: 2021-03-26
Attending: OBSTETRICS & GYNECOLOGY
Payer: COMMERCIAL

## 2021-03-26 DIAGNOSIS — O35.9XX0 SUSPECTED FETAL ANOMALY, ANTEPARTUM, SINGLE OR UNSPECIFIED FETUS: Primary | ICD-10-CM

## 2021-03-26 PROCEDURE — 76816 OB US FOLLOW-UP PER FETUS: CPT

## 2021-03-26 PROCEDURE — 76816 OB US FOLLOW-UP PER FETUS: CPT | Mod: 26 | Performed by: OBSTETRICS & GYNECOLOGY

## 2021-03-26 NOTE — PROGRESS NOTES
The patient was seen for an ultrasound in the [facility] at the [facility] today.  For a detailed report of the ultrasound examination, please see the ultrasound report which can be found under the imaging tab.    Massiel Blair MD  , OB/GYN  Maternal-Fetal Medicine  [phone] (Pager)

## 2021-04-30 ENCOUNTER — OFFICE VISIT (OUTPATIENT)
Dept: CARDIOLOGY | Facility: CLINIC | Age: 26
End: 2021-04-30
Payer: COMMERCIAL

## 2021-04-30 ENCOUNTER — HOSPITAL ENCOUNTER (OUTPATIENT)
Dept: CARDIOLOGY | Facility: CLINIC | Age: 26
Discharge: HOME | End: 2021-04-30
Attending: OBSTETRICS & GYNECOLOGY | Admitting: OBSTETRICS & GYNECOLOGY
Payer: COMMERCIAL

## 2021-04-30 DIAGNOSIS — O35.9XX0 SUSPECTED FETAL ANOMALY, ANTEPARTUM, SINGLE OR UNSPECIFIED FETUS: ICD-10-CM

## 2021-04-30 DIAGNOSIS — O35.9XX0 SUSPECTED FETAL ANOMALY, ANTEPARTUM, SINGLE OR UNSPECIFIED FETUS: Primary | ICD-10-CM

## 2021-04-30 PROCEDURE — 76827 ECHO EXAM OF FETAL HEART: CPT | Mod: 26 | Performed by: PEDIATRICS

## 2021-04-30 PROCEDURE — 93325 DOPPLER ECHO COLOR FLOW MAPG: CPT | Mod: 26 | Performed by: PEDIATRICS

## 2021-04-30 PROCEDURE — 93325 DOPPLER ECHO COLOR FLOW MAPG: CPT

## 2021-04-30 PROCEDURE — 99202 OFFICE O/P NEW SF 15 MIN: CPT | Mod: 25 | Performed by: PEDIATRICS

## 2021-04-30 PROCEDURE — 76827 ECHO EXAM OF FETAL HEART: CPT

## 2021-04-30 PROCEDURE — 76825 ECHO EXAM OF FETAL HEART: CPT | Mod: 26 | Performed by: PEDIATRICS

## 2021-04-30 NOTE — PROGRESS NOTES
Fetal Cardiology Consultation    Patient:  Chelo Garcia MRN:  [id number]   YOB: 1995 Age:  26 year old   Date of Visit:  2021 PCP:  Andria Pearce MD   GUERA: 2021, by Last Menstrual Period EGA: 26w4d weeks     Dear Dr. Blair:    I had the pleasure of seeing Chelo Garcia at the [facility]etal [facility]ch[facility] in [place] on 2021 in consultation for fetal echocardiography results. She presented today accompanied by her partner. As you know, she is a 26 year old female with suspected fetal cardiac anomaly on obstetrical ultrasound (perimembranous ventricular septal defect).    The fetal echocardiogram was normal. Normal fetal cardiac anatomy; specifically no ventricular septal defects seen. Normal right and left ventricular size and function without hypertrophy. No evidence of diastolic dysfunction. No pericardial effusion. No arrhythmia.     I reviewed and interpreted the fetal echocardiogram today. I discussed the normal results with Ms. Garcia and her partner. While these results are normal, it is important to note that fetal echocardiography cannot exclude small atrial or ventricular septal defects, persistent ductus arteriosus, mild coarctation of the aorta, partial anomalous pulmonary venous return, minor anatomic valve anomalies, or coronary artery anomalies.     -- No additional fetal echocardiograms are recommended for this pregnancy.  -- A post-corina transthoracic echocardiogram is recommended as an outpatient within 2 weeks after delivery.    Thank you for allowing me to participate in Ms. Garcia's care. Please don't hesitate to contact me or the Fetal Cardiology team at [facility] with any questions or concerns.     I spent a total of 20 minutes on the date of the encounter doing chart review, patient history, documentation, counseling, and coordinating care.    Ottoniel Novak MD  Pediatric Cardiology  [facility]  [facility]  Phone [phone]    Review of the result(s) of each unique test - fetal echocardiogram  Independent interpretation of a test performed by another physician/other qualified health care professional (not separately reported) - [facility]  from 3/216/21

## 2021-05-10 ENCOUNTER — PRENATAL OFFICE VISIT (OUTPATIENT)
Dept: OBGYN | Facility: CLINIC | Age: 26
End: 2021-05-10
Payer: COMMERCIAL

## 2021-05-10 VITALS — WEIGHT: 220 LBS | SYSTOLIC BLOOD PRESSURE: 118 MMHG | BODY MASS INDEX: 37.18 KG/M2 | DIASTOLIC BLOOD PRESSURE: 80 MMHG

## 2021-05-10 DIAGNOSIS — Z23 ENCOUNTER FOR IMMUNIZATION: ICD-10-CM

## 2021-05-10 DIAGNOSIS — Z3A.28 28 WEEKS GESTATION OF PREGNANCY: Primary | ICD-10-CM

## 2021-05-10 LAB
ERYTHROCYTE [DISTWIDTH] IN BLOOD BY AUTOMATED COUNT: 15.1 % (ref 10–15)
GLUCOSE 1H P 50 G GLC PO SERPL-MCNC: 98 MG/DL (ref 60–129)
HCT VFR BLD AUTO: 32.1 % (ref 35–47)
HGB BLD-MCNC: 10.3 G/DL (ref 11.7–15.7)
MCH RBC QN AUTO: 27.6 PG (ref 26.5–33)
MCHC RBC AUTO-ENTMCNC: 32.1 G/DL (ref 31.5–36.5)
MCV RBC AUTO: 86 FL (ref 78–100)
PLATELET # BLD AUTO: 235 10E9/L (ref 150–450)
RBC # BLD AUTO: 3.73 10E12/L (ref 3.8–5.2)
WBC # BLD AUTO: 12 10E9/L (ref 4–11)

## 2021-05-10 PROCEDURE — 99000 SPECIMEN HANDLING OFFICE-LAB: CPT | Performed by: OBSTETRICS & GYNECOLOGY

## 2021-05-10 PROCEDURE — 36415 COLL VENOUS BLD VENIPUNCTURE: CPT | Performed by: OBSTETRICS & GYNECOLOGY

## 2021-05-10 PROCEDURE — 86780 TREPONEMA PALLIDUM: CPT | Mod: 90 | Performed by: OBSTETRICS & GYNECOLOGY

## 2021-05-10 PROCEDURE — 90715 TDAP VACCINE 7 YRS/> IM: CPT | Performed by: OBSTETRICS & GYNECOLOGY

## 2021-05-10 PROCEDURE — 90471 IMMUNIZATION ADMIN: CPT | Performed by: OBSTETRICS & GYNECOLOGY

## 2021-05-10 PROCEDURE — 85027 COMPLETE CBC AUTOMATED: CPT | Performed by: OBSTETRICS & GYNECOLOGY

## 2021-05-10 PROCEDURE — 99207 PR PRENATAL VISIT: CPT | Performed by: OBSTETRICS & GYNECOLOGY

## 2021-05-10 PROCEDURE — 82950 GLUCOSE TEST: CPT | Performed by: OBSTETRICS & GYNECOLOGY

## 2021-05-10 NOTE — PROGRESS NOTES
26 year old  at 28w0d     - O+/RI.  FOB Savaun.  S/p flu shot.  NIPT nl XY.  nl early GCT.  Will do 28 wk GCT today.  TDaP today.  Planning covid vaccine in 2 wks. Plans to breastfeed, planning IUD postpartum.   - Level 2 with possible membranous VSD - normal fetal echo.  Plan  echo within 2 weeks of life.   - H/o depression, currently managed well on wellbutrin and sertraline.   - Obesity BMI 35.  s/p level 2 US, on baby ASA.    RTC 2 wks     Courtney Inman MD, MPH  [facility] OB/Gyn

## 2021-05-10 NOTE — NURSING NOTE
"Chief Complaint   Patient presents with     Prenatal Care     28 weeks       Initial /80   Wt 99.8 kg (220 lb)   LMP 10/26/2020   BMI 37.18 kg/m   Estimated body mass index is 37.18 kg/m  as calculated from the following:    Height as of 20: 1.638 m (5' 4.5\").    Weight as of this encounter: 99.8 kg (220 lb).  BP completed using cuff size: large    Questioned patient about current smoking habits.  Pt. has never smoked.          The following HM Due: NONE  +fetal movement  ++swelling    Mary Aguayo, CMA    "

## 2021-05-11 LAB — T PALLIDUM AB SER QL: NONREACTIVE

## 2021-06-04 ENCOUNTER — PRENATAL OFFICE VISIT (OUTPATIENT)
Dept: OBGYN | Facility: CLINIC | Age: 26
End: 2021-06-04
Payer: COMMERCIAL

## 2021-06-04 VITALS — SYSTOLIC BLOOD PRESSURE: 124 MMHG | DIASTOLIC BLOOD PRESSURE: 80 MMHG | WEIGHT: 224 LBS | BODY MASS INDEX: 37.86 KG/M2

## 2021-06-04 DIAGNOSIS — Z34.80 SUPERVISION OF OTHER NORMAL PREGNANCY, ANTEPARTUM: Primary | ICD-10-CM

## 2021-06-04 PROCEDURE — 99207 PR PRENATAL VISIT: CPT | Performed by: OBSTETRICS & GYNECOLOGY

## 2021-06-04 RX ORDER — FERROUS SULFATE 325(65) MG
325 TABLET ORAL
COMMUNITY

## 2021-06-04 NOTE — NURSING NOTE
"Chief Complaint   Patient presents with     Prenatal Care     31 4/7 weeks       Initial /80   Wt 101.6 kg (224 lb)   LMP 10/26/2020   BMI 37.86 kg/m   Estimated body mass index is 37.86 kg/m  as calculated from the following:    Height as of 20: 1.638 m (5' 4.5\").    Weight as of this encounter: 101.6 kg (224 lb).  BP completed using cuff size: large    Questioned patient about current smoking habits.  Pt. has never smoked.          The following HM Due: NONE    +fetal movement  +slight swelling    Mary Aguayo, CMA    "

## 2021-06-04 NOTE — PROGRESS NOTES
26 year old  at 31w4d     - O+/RI.  NIPT nl XY.  s/p TDaP.  Planning covid vaccine. Plans to breastfeed, planning IUD postpartum.   - Level 2 with possible membranous VSD - normal fetal echo.  Plan  echo within 2 weeks of life.     RTC 2 wks     Courtney Inman MD, MPH  [facility] OB/Gyn

## 2021-06-16 ENCOUNTER — APPOINTMENT (OUTPATIENT)
Dept: ULTRASOUND IMAGING | Facility: CLINIC | Age: 26
End: 2021-06-16
Attending: OBSTETRICS & GYNECOLOGY
Payer: COMMERCIAL

## 2021-06-16 ENCOUNTER — TELEPHONE (OUTPATIENT)
Dept: OBGYN | Facility: CLINIC | Age: 26
End: 2021-06-16

## 2021-06-16 ENCOUNTER — HOSPITAL ENCOUNTER (OUTPATIENT)
Facility: CLINIC | Age: 26
Discharge: HOME | End: 2021-06-16
Attending: OBSTETRICS & GYNECOLOGY | Admitting: OBSTETRICS & GYNECOLOGY
Payer: COMMERCIAL

## 2021-06-16 VITALS
DIASTOLIC BLOOD PRESSURE: 77 MMHG | BODY MASS INDEX: 38.58 KG/M2 | SYSTOLIC BLOOD PRESSURE: 124 MMHG | HEIGHT: 64 IN | WEIGHT: 226 LBS | TEMPERATURE: 99.2 F | RESPIRATION RATE: 16 BRPM

## 2021-06-16 PROBLEM — Z36.89 ENCOUNTER FOR TRIAGE IN PREGNANT PATIENT: Status: ACTIVE | Noted: 2021-06-16

## 2021-06-16 LAB
FERN CRYSTALLIZATION: NORMAL
RUPTURE OF FETAL MEMBRANES BY ROM PLUS: POSITIVE

## 2021-06-16 PROCEDURE — 99213 OFFICE O/P EST LOW 20 MIN: CPT | Performed by: OBSTETRICS & GYNECOLOGY

## 2021-06-16 PROCEDURE — G0463 HOSPITAL OUTPT CLINIC VISIT: HCPCS

## 2021-06-16 PROCEDURE — 76815 OB US LIMITED FETUS(S): CPT

## 2021-06-16 PROCEDURE — 84112 EVAL AMNIOTIC FLUID PROTEIN: CPT | Performed by: OBSTETRICS & GYNECOLOGY

## 2021-06-16 ASSESSMENT — MIFFLIN-ST. JEOR: SCORE: 1750.13

## 2021-06-16 NOTE — TELEPHONE ENCOUNTER
Pt calling, states she has had fluid leaking about 3 separate times over the past 24 hours.  Enough to soak her underwear and leak down her leg.  Normal FM, no bleeding, notes cramping occasionally.    Advised to go to L&D for eval.  L&D notified.  Will notify Dr Cardona who is on call.    Anne Meza RN

## 2021-06-16 NOTE — PROVIDER NOTIFICATION
Data: Patient assessed in the [facility] for leaking vaginal fluid.  Cervical exam not examined.  Although ROM plus came back positive, clinical assessment shows membranes intact-no fluid seen coming from vagina while patient was here, fern showed no ferning and ultrasound showed MVP 11.0 cm.  Contractions/uterine assessment every 2-6 minutes, patient not feeling.  Action:  Presumed adequate fetal oxygenation documented (see flow record). Discharge instructions reviewed.  Patient instructed to report change in fetal movement, vaginal leaking of fluid or bleeding, abdominal pain, or any concerns related to the pregnancy to her nurse/physician.    Response: Orders to discharge home per Ion Cardona.  Patient verbalized understanding of education and verbalized agreement with plan. Discharged to home at 1150.

## 2021-06-16 NOTE — PROGRESS NOTES
"[facility] Antepartum Evaluation    Chelo Garcia MRN# [id number]   Age: 26 year old YOB: 1995     Date of Evaluation:  2021    Primary care provider: Andria Pearce           Chief Complaint:   Chelo Garcia is a 26 year old female who is  pregnant female at 33w2d  being evaluated for possible ROM.    She contacted the [employer] line today reporting    \" Pt calling, states she has had fluid leaking about 3 separate times over the past 24 hours.  Enough to soak her underwear and leak down her leg.  Normal FM, no bleeding, notes cramping occasionally.     Advised to go to L&D for eval.  L&D notified.\"           Pregnancy history:     OBSTETRIC HISTORY:    OB History    Para Term  AB Living   3 0 0 0 2 0   SAB TAB Ectopic Multiple Live Births   1 0 0 0 0      # Outcome Date GA Lbr Santi/2nd Weight Sex Delivery Anes PTL Lv   3 Current            2 2018           1 AB                EDC: Estimated Date of Delivery: Aug 2, 2021    Prenatal Labs:   Lab Results   Component Value Date    ABO O 2021    RH Pos 2021    AS Neg 2021    HEPBANG Nonreactive 2021    CHPCRT Negative 2019    GCPCRT Negative 2019    TREPAB Negative 2018    HGB 10.3 (L) 05/10/2021       GBS Status:   No results found for: GBS    Active Problem List  Patient Active Problem List   Diagnosis     BMI 28.0-28.9,adult     Major depressive disorder, recurrent episode, severe (H)     Generalized anxiety disorder     Psychophysiological insomnia     Current occasional smoker     Cannabis abuse, episodic use     Encounter for triage in pregnant patient       Medication Prior to Admission  Medications Prior to Admission   Medication Sig Dispense Refill Last Dose     aspirin 81 MG EC tablet Take 81 mg by mouth daily   6/15/2021 at Unknown time     buPROPion (WELLBUTRIN SR) 100 MG 12 hr tablet Take 1.5 tablets (150 mg) by mouth daily 45 tablet 0 6/15/2021 at " Unknown time     ferrous sulfate (FEROSUL) 325 (65 Fe) MG tablet Take 325 mg by mouth daily (with breakfast)   6/15/2021 at Unknown time     Prenatal Vit-Fe Fumarate-FA (PRENATAL MULTIVITAMIN W/IRON) 27-0.8 MG tablet Take 1 tablet by mouth daily   6/15/2021 at Unknown time     sertraline (ZOLOFT) 100 MG tablet Take 1.5 tablets (150 mg) by mouth daily 45 tablet 1 6/15/2021 at Unknown time   .        Maternal Past Medical History:     Past Medical History:   Diagnosis Date     Chronic midline low back pain without sciatica 7/24/2017     Depressive disorder April 2017     Overweight 7/24/2017     Proteinuria     small protein on screening UA - recheck                       Family History:     Family History   Problem Relation Age of Onset     Ovarian Cancer Mother         first had cervical caner , 2016 got ovarian caner at age 38      Cervical Cancer Mother      Mental Illness Mother      Bipolar Disorder Mother      Breast Cancer Other      No Known Problems Father      Family history reviewed and updated in EPIC and not discussed            Social History:     Social History     Tobacco Use     Smoking status: Never Smoker     Smokeless tobacco: Never Used   Substance Use Topics     Alcohol use: Not Currently     Comment: Socially, once a week            Review of Systems:   The Review of Systems is negative other than noted in the HPI          Physical Exam:     Vitals:    06/16/21 0950 06/16/21 1005 06/16/21 1020 06/16/21 1035   BP: 132/79 130/81 129/80 124/77   Resp:       Temp:       TempSrc:       Weight:       Height:         Gen: resting comfortably, in no acute distress  CV: regular rate, well perfused  Abd: gravid, soft, no ttp, no rebound.  Bedside sono performed with abundant AF including fluid adjacent to cerix  Skin: warm and dry, no rashes/lesions  Psych: appropriate affect  Neuro: A+Ox3   Vaginal exam with scant fluid per RN    FHTs reactive, occ ctx not felt by patient                 Results for  orders placed or performed during the hospital encounter of 21 (from the past 24 hour(s))   Rupture of Fetal Membranes by ROM Plus   Result Value Ref Range    Rupture of Fetal Membranes by ROM Plus Positive (A) NEG^Negative   Fern and nitrazine test   Result Value Ref Range    Fern Crystallization No Ferning Present      Ultrasound today       FINDINGS:  FETAL POSITION: Cephalic     The visualized fetal 4 chamber heart, stomach, bladder and kidneys are  grossly unremarkable.  PLACENTA LOCATION: Right lateral fundal  AMNIOTIC FLUID: 1.0 cm maximum vertical fluid pocket  FETAL HEART RATE: 130 bpm                                                                         IMPRESSION:  1.  Single living intrauterine gestation.   2.  Mildly elevated amniotic fluid level        Assessment:   Chelo Garcia is a  at 33w2d evaluated for possible ROM.  Clinical assessment, U/S and fern all reassuring    ROM+ was positive but this is felt to be a false + given the other results    Patient informed of signs/symptoms of ROM        Plan:    Follow up  with Dr. nIman as previously scheduled    Derik Cardona MD

## 2021-06-16 NOTE — DISCHARGE INSTRUCTIONS
Discharge Instruction for Undelivered Patients      You were seen for: Membrane Assessment  We Consulted: Dr. Cardona  You had (Test or Medicine): uterine and fetal monitoring, lab tests, and an ultrasound     Diet:   Drink 8 to 12 glasses of liquids (milk, juice, water) every day.  You may eat meals and snacks.     Activity:  Call your doctor or nurse midwife if your baby is moving less than usual.     Call your provider if you notice:  Swelling in your face or increased swelling in your hands or legs.  Headaches that are not relieved by Tylenol (acetaminophen).  Changes in your vision (blurring: seeing spots or stars.)  Nausea (sick to your stomach) and vomiting (throwing up).   Weight gain of 5 pounds or more per week.  Heartburn that doesn't go away.  Signs of bladder infection: pain when you urinate (use the toilet), need to go more often and more urgently.  The bag of diop (rupture of membranes) breaks, or you notice leaking in your underwear.  Bright red blood in your underwear.  Abdominal (lower belly) or stomach pain.  For first baby: Contractions (tightening) less than 5 minutes apart for one hour or more.  Second (plus) baby: Contractions (tightening) less than 10 minutes apart and getting stronger.  *If less than 34 weeks: Contractions (tightenings) more than 6 times in one hour.  Increase or change in vaginal discharge (note the color and amount)    Follow-up:  As scheduled in the clinic on Monday

## 2021-06-16 NOTE — PROVIDER NOTIFICATION
06/16/21 1127   Provider Notification   Provider Name/Title Dr. Cardona   Method of Notification Phone   MD updated: ultrasound results show MVP 11.0 cm, polyhydramnios. MD reviewing ultrasound images. Prior to going to ultrasound FHT's category one with accelerations and patient sheila every 3-6 minutes, states she is not feeling them. Plan for patient to be discharged and follow up in clinic on Monday as scheduled. MD will come to bedside prior to discharge to explain test results.

## 2021-06-16 NOTE — PROVIDER NOTIFICATION
06/16/21 1050   Provider Notification   Provider Name/Title Dr. Cardona   Method of Notification Phone   MD updated: ROM plus positive and fern showed no ferning. No fluid has been seen coming from vagina since arrival. Order received for ultrasound to evaluate amniotic fluid. Also reviewed initial BP's 130's/80's then 120's/70's-80's. Patient denies PIH symptoms. Will update MD when ultrasound results back.

## 2021-06-16 NOTE — PROVIDER NOTIFICATION
21 0910   Provider Notification   Provider Name/Title Dr. Cardona   Method of Notification At Bedside   Data: Patient presented to [facility]: 2021  8:56 AM.  Reason for maternal/fetal assessment is vaginal bleeding. Patient reports clear fluid leaking from her vagina and trickling down her leg three times, first around 1700 yesterday. She reports having intercourse two days ago. Patient is a .  Prenatal record reviewed. Pregnancy  has been complicated by occasional marijuana use.  Gestational Age 33w2d. VSS. Fetal movement active. Patient denies uterine contractions, vaginal bleeding, abdominal pain, nausea, vomiting, headache, visual disturbances, epigastric or URQ pain, significant edema. Support person is not present.   Action: Verbal consent for EFM. Triage assessment completed. Bill of rights reviewed.  Response: Patient verbalized agreement with plan.     Dr. Ion Cardona at bedside upon patient arrival. Bedside ultrasound performed and fluid visualized. Orders received for ROM plus and fern. MD states to also collect GBS swab and send if patient admitted. Will update MD when lab results are back.

## 2021-06-21 ENCOUNTER — PRENATAL OFFICE VISIT (OUTPATIENT)
Dept: OBGYN | Facility: CLINIC | Age: 26
End: 2021-06-21
Payer: COMMERCIAL

## 2021-06-21 VITALS — DIASTOLIC BLOOD PRESSURE: 80 MMHG | SYSTOLIC BLOOD PRESSURE: 128 MMHG | BODY MASS INDEX: 39.31 KG/M2 | WEIGHT: 229 LBS

## 2021-06-21 DIAGNOSIS — Z34.80 SUPERVISION OF OTHER NORMAL PREGNANCY, ANTEPARTUM: Primary | ICD-10-CM

## 2021-06-21 PROCEDURE — 99207 PR PRENATAL VISIT: CPT | Performed by: OBSTETRICS & GYNECOLOGY

## 2021-06-21 NOTE — NURSING NOTE
"Chief Complaint   Patient presents with     Prenatal Care     34 weeks       Initial /80   Wt 103.9 kg (229 lb)   LMP 10/26/2020   BMI 39.31 kg/m   Estimated body mass index is 39.31 kg/m  as calculated from the following:    Height as of 21: 1.626 m (5' 4\").    Weight as of this encounter: 103.9 kg (229 lb).  BP completed using cuff size: large    Questioned patient about current smoking habits.  Pt. has never smoked.          The following HM Due: NONE    +fetal movement  +slight hands in AM    Mary Aguayo, PABLO      "

## 2021-06-21 NOTE — PROGRESS NOTES
"26 year old  at 34w0d     - was in L&D triage for rule out rupture (something \"ran down her leg\"), ROM+ was pos but ferning and nitrazine neg and there was normal AF around baby.  No ongoing leaking.  No fevers since.  Reviewed s/s PTL and ROM, reviewed when to return.   - plan to recheck cvx and do repeat GBS at next visit  - O+/RI.  NIPT nl XY.  s/p TDaP.  Planning covid vaccine. Plans to breastfeed, planning IUD postpartum.   - Level 2 with possible membranous VSD - normal fetal echo.  Plan  echo within 2 weeks of life.     RTC 2 wks     Courtney Inman MD, MPH  [facility] OB/Gyn     "

## 2021-06-28 ENCOUNTER — HOSPITAL ENCOUNTER (INPATIENT)
Facility: CLINIC | Age: 26
LOS: 3 days | Discharge: HOME | End: 2021-07-01
Attending: OBSTETRICS & GYNECOLOGY | Admitting: OBSTETRICS & GYNECOLOGY
Payer: COMMERCIAL

## 2021-06-28 ENCOUNTER — ANESTHESIA EVENT (OUTPATIENT)
Dept: SURGERY | Facility: CLINIC | Age: 26
End: 2021-06-28
Payer: COMMERCIAL

## 2021-06-28 ENCOUNTER — ANESTHESIA (OUTPATIENT)
Dept: SURGERY | Facility: CLINIC | Age: 26
End: 2021-06-28
Payer: COMMERCIAL

## 2021-06-28 ENCOUNTER — MYC MEDICAL ADVICE (OUTPATIENT)
Dept: OBGYN | Facility: CLINIC | Age: 26
End: 2021-06-28

## 2021-06-28 LAB
ABO + RH BLD: NORMAL
ABO + RH BLD: NORMAL
AMPHETAMINES UR QL SCN: NEGATIVE
BASOPHILS # BLD AUTO: 0 10E9/L (ref 0–0.2)
BASOPHILS NFR BLD AUTO: 0.2 %
BLD GP AB SCN SERPL QL: NORMAL
BLOOD BANK CMNT PATIENT-IMP: NORMAL
CANNABINOIDS UR QL: ABNORMAL
COCAINE UR QL: NEGATIVE
CREAT UR-MCNC: 128 MG/DL
DIFFERENTIAL METHOD BLD: ABNORMAL
EOSINOPHIL # BLD AUTO: 0.1 10E9/L (ref 0–0.7)
EOSINOPHIL NFR BLD AUTO: 0.4 %
ERYTHROCYTE [DISTWIDTH] IN BLOOD BY AUTOMATED COUNT: 15.2 % (ref 10–15)
HCT VFR BLD AUTO: 32.9 % (ref 35–47)
HGB BLD-MCNC: 10.5 G/DL (ref 11.7–15.7)
IMM GRANULOCYTES # BLD: 0.1 10E9/L (ref 0–0.4)
IMM GRANULOCYTES NFR BLD: 0.5 %
LABORATORY COMMENT REPORT: NORMAL
LYMPHOCYTES # BLD AUTO: 1.8 10E9/L (ref 0.8–5.3)
LYMPHOCYTES NFR BLD AUTO: 15.7 %
MCH RBC QN AUTO: 28 PG (ref 26.5–33)
MCHC RBC AUTO-ENTMCNC: 31.9 G/DL (ref 31.5–36.5)
MCV RBC AUTO: 88 FL (ref 78–100)
MONOCYTES # BLD AUTO: 0.5 10E9/L (ref 0–1.3)
MONOCYTES NFR BLD AUTO: 4.3 %
NEUTROPHILS # BLD AUTO: 8.8 10E9/L (ref 1.6–8.3)
NEUTROPHILS NFR BLD AUTO: 78.9 %
NRBC # BLD AUTO: 0 10*3/UL
NRBC BLD AUTO-RTO: 0 /100
OPIATES UR QL SCN: NEGATIVE
PCP UR QL SCN: NEGATIVE
PLATELET # BLD AUTO: 224 10E9/L (ref 150–450)
RBC # BLD AUTO: 3.75 10E12/L (ref 3.8–5.2)
SARS-COV-2 RNA RESP QL NAA+PROBE: NEGATIVE
SPECIMEN EXP DATE BLD: NORMAL
SPECIMEN SOURCE: NORMAL
WBC # BLD AUTO: 11.2 10E9/L (ref 4–11)

## 2021-06-28 PROCEDURE — 258N000003 HC RX IP 258 OP 636: Performed by: OBSTETRICS & GYNECOLOGY

## 2021-06-28 PROCEDURE — 250N000009 HC RX 250: Performed by: ANESTHESIOLOGY

## 2021-06-28 PROCEDURE — 250N000011 HC RX IP 250 OP 636: Performed by: ANESTHESIOLOGY

## 2021-06-28 PROCEDURE — 80307 DRUG TEST PRSMV CHEM ANLYZR: CPT | Performed by: OBSTETRICS & GYNECOLOGY

## 2021-06-28 PROCEDURE — 86901 BLOOD TYPING SEROLOGIC RH(D): CPT | Performed by: OBSTETRICS & GYNECOLOGY

## 2021-06-28 PROCEDURE — 85025 COMPLETE CBC W/AUTO DIFF WBC: CPT | Performed by: OBSTETRICS & GYNECOLOGY

## 2021-06-28 PROCEDURE — 86850 RBC ANTIBODY SCREEN: CPT | Performed by: OBSTETRICS & GYNECOLOGY

## 2021-06-28 PROCEDURE — 250N000011 HC RX IP 250 OP 636: Performed by: OBSTETRICS & GYNECOLOGY

## 2021-06-28 PROCEDURE — 120N000001 HC R&B MED SURG/OB

## 2021-06-28 PROCEDURE — 250N000013 HC RX MED GY IP 250 OP 250 PS 637: Performed by: OBSTETRICS & GYNECOLOGY

## 2021-06-28 PROCEDURE — 87653 STREP B DNA AMP PROBE: CPT | Performed by: OBSTETRICS & GYNECOLOGY

## 2021-06-28 PROCEDURE — 250N000009 HC RX 250: Performed by: OBSTETRICS & GYNECOLOGY

## 2021-06-28 PROCEDURE — 80349 CANNABINOIDS NATURAL: CPT | Performed by: OBSTETRICS & GYNECOLOGY

## 2021-06-28 PROCEDURE — 3E0P7VZ INTRODUCTION OF HORMONE INTO FEMALE REPRODUCTIVE, VIA NATURAL OR ARTIFICIAL OPENING: ICD-10-PCS | Performed by: OBSTETRICS & GYNECOLOGY

## 2021-06-28 PROCEDURE — 87635 SARS-COV-2 COVID-19 AMP PRB: CPT | Performed by: OBSTETRICS & GYNECOLOGY

## 2021-06-28 PROCEDURE — G0463 HOSPITAL OUTPT CLINIC VISIT: HCPCS

## 2021-06-28 PROCEDURE — 3E033VJ INTRODUCTION OF OTHER HORMONE INTO PERIPHERAL VEIN, PERCUTANEOUS APPROACH: ICD-10-PCS | Performed by: OBSTETRICS & GYNECOLOGY

## 2021-06-28 PROCEDURE — 86900 BLOOD TYPING SEROLOGIC ABO: CPT | Performed by: OBSTETRICS & GYNECOLOGY

## 2021-06-28 PROCEDURE — 86780 TREPONEMA PALLIDUM: CPT | Performed by: OBSTETRICS & GYNECOLOGY

## 2021-06-28 RX ORDER — NALBUPHINE HYDROCHLORIDE 10 MG/ML
2.5-5 INJECTION, SOLUTION INTRAMUSCULAR; INTRAVENOUS; SUBCUTANEOUS EVERY 6 HOURS PRN
Status: DISCONTINUED | OUTPATIENT
Start: 2021-06-28 | End: 2021-07-01 | Stop reason: HOSPADM

## 2021-06-28 RX ORDER — LIDOCAINE HYDROCHLORIDE AND EPINEPHRINE 15; 5 MG/ML; UG/ML
INJECTION, SOLUTION EPIDURAL PRN
Status: DISCONTINUED | OUTPATIENT
Start: 2021-06-28 | End: 2021-06-29

## 2021-06-28 RX ORDER — TRANEXAMIC ACID 10 MG/ML
1 INJECTION, SOLUTION INTRAVENOUS EVERY 30 MIN PRN
Status: DISCONTINUED | OUTPATIENT
Start: 2021-06-28 | End: 2021-06-29

## 2021-06-28 RX ORDER — METHYLERGONOVINE MALEATE 0.2 MG/ML
200 INJECTION INTRAVENOUS
Status: DISCONTINUED | OUTPATIENT
Start: 2021-06-28 | End: 2021-06-29

## 2021-06-28 RX ORDER — NALOXONE HYDROCHLORIDE 0.4 MG/ML
0.4 INJECTION, SOLUTION INTRAMUSCULAR; INTRAVENOUS; SUBCUTANEOUS
Status: DISCONTINUED | OUTPATIENT
Start: 2021-06-28 | End: 2021-06-29

## 2021-06-28 RX ORDER — MISOPROSTOL 100 UG/1
25 TABLET ORAL
Status: DISCONTINUED | OUTPATIENT
Start: 2021-06-28 | End: 2021-06-29

## 2021-06-28 RX ORDER — OXYTOCIN/0.9 % SODIUM CHLORIDE 30/500 ML
1-24 PLASTIC BAG, INJECTION (ML) INTRAVENOUS CONTINUOUS
Status: DISCONTINUED | OUTPATIENT
Start: 2021-06-28 | End: 2021-06-29

## 2021-06-28 RX ORDER — ACETAMINOPHEN 325 MG/1
650 TABLET ORAL EVERY 4 HOURS PRN
Status: DISCONTINUED | OUTPATIENT
Start: 2021-06-28 | End: 2021-06-29

## 2021-06-28 RX ORDER — OXYCODONE AND ACETAMINOPHEN 5; 325 MG/1; MG/1
1 TABLET ORAL
Status: DISCONTINUED | OUTPATIENT
Start: 2021-06-28 | End: 2021-06-29

## 2021-06-28 RX ORDER — EPHEDRINE SULFATE 50 MG/ML
5 INJECTION, SOLUTION INTRAMUSCULAR; INTRAVENOUS; SUBCUTANEOUS
Status: DISCONTINUED | OUTPATIENT
Start: 2021-06-28 | End: 2021-07-01 | Stop reason: HOSPADM

## 2021-06-28 RX ORDER — SODIUM CHLORIDE, SODIUM LACTATE, POTASSIUM CHLORIDE, CALCIUM CHLORIDE 600; 310; 30; 20 MG/100ML; MG/100ML; MG/100ML; MG/100ML
INJECTION, SOLUTION INTRAVENOUS CONTINUOUS
Status: DISCONTINUED | OUTPATIENT
Start: 2021-06-28 | End: 2021-06-29

## 2021-06-28 RX ORDER — NALOXONE HYDROCHLORIDE 0.4 MG/ML
0.2 INJECTION, SOLUTION INTRAMUSCULAR; INTRAVENOUS; SUBCUTANEOUS
Status: DISCONTINUED | OUTPATIENT
Start: 2021-06-28 | End: 2021-06-29

## 2021-06-28 RX ORDER — CARBOPROST TROMETHAMINE 250 UG/ML
250 INJECTION, SOLUTION INTRAMUSCULAR
Status: DISCONTINUED | OUTPATIENT
Start: 2021-06-28 | End: 2021-06-29

## 2021-06-28 RX ORDER — FENTANYL CITRATE 50 UG/ML
50-100 INJECTION, SOLUTION INTRAMUSCULAR; INTRAVENOUS
Status: DISCONTINUED | OUTPATIENT
Start: 2021-06-28 | End: 2021-06-29

## 2021-06-28 RX ORDER — IBUPROFEN 800 MG/1
800 TABLET, FILM COATED ORAL
Status: DISCONTINUED | OUTPATIENT
Start: 2021-06-28 | End: 2021-06-29

## 2021-06-28 RX ORDER — BUPIVACAINE HYDROCHLORIDE 2.5 MG/ML
INJECTION, SOLUTION EPIDURAL; INFILTRATION; INTRACAUDAL PRN
Status: DISCONTINUED | OUTPATIENT
Start: 2021-06-28 | End: 2021-06-29

## 2021-06-28 RX ORDER — OXYTOCIN 10 [USP'U]/ML
10 INJECTION, SOLUTION INTRAMUSCULAR; INTRAVENOUS
Status: DISCONTINUED | OUTPATIENT
Start: 2021-06-28 | End: 2021-06-29

## 2021-06-28 RX ORDER — OXYTOCIN/0.9 % SODIUM CHLORIDE 30/500 ML
100-340 PLASTIC BAG, INJECTION (ML) INTRAVENOUS CONTINUOUS PRN
Status: COMPLETED | OUTPATIENT
Start: 2021-06-28 | End: 2021-06-29

## 2021-06-28 RX ORDER — PENICILLIN G POTASSIUM 5000000 [IU]/1
5 INJECTION, POWDER, FOR SOLUTION INTRAMUSCULAR; INTRAVENOUS ONCE
Status: COMPLETED | OUTPATIENT
Start: 2021-06-28 | End: 2021-06-28

## 2021-06-28 RX ORDER — ONDANSETRON 2 MG/ML
4 INJECTION INTRAMUSCULAR; INTRAVENOUS EVERY 6 HOURS PRN
Status: DISCONTINUED | OUTPATIENT
Start: 2021-06-28 | End: 2021-06-29

## 2021-06-28 RX ADMIN — Medication: at 18:45

## 2021-06-28 RX ADMIN — SODIUM CHLORIDE, POTASSIUM CHLORIDE, SODIUM LACTATE AND CALCIUM CHLORIDE: 600; 310; 30; 20 INJECTION, SOLUTION INTRAVENOUS at 14:37

## 2021-06-28 RX ADMIN — SODIUM CHLORIDE 2.5 MILLION UNITS: 9 INJECTION, SOLUTION INTRAVENOUS at 18:41

## 2021-06-28 RX ADMIN — PENICILLIN G POTASSIUM 5 MILLION UNITS: 5000000 POWDER, FOR SOLUTION INTRAMUSCULAR; INTRAPLEURAL; INTRATHECAL; INTRAVENOUS at 14:40

## 2021-06-28 RX ADMIN — Medication 1 MILLI-UNITS/MIN: at 18:49

## 2021-06-28 RX ADMIN — SODIUM CHLORIDE 2.5 MILLION UNITS: 9 INJECTION, SOLUTION INTRAVENOUS at 22:52

## 2021-06-28 RX ADMIN — LIDOCAINE HYDROCHLORIDE,EPINEPHRINE BITARTRATE 3 ML: 15; .005 INJECTION, SOLUTION EPIDURAL; INFILTRATION; INTRACAUDAL; PERINEURAL at 18:37

## 2021-06-28 RX ADMIN — Medication 25 MCG: at 14:29

## 2021-06-28 RX ADMIN — BUPIVACAINE HYDROCHLORIDE 8 ML: 2.5 INJECTION, SOLUTION EPIDURAL; INFILTRATION; INTRACAUDAL at 18:45

## 2021-06-28 RX ADMIN — SODIUM CHLORIDE, POTASSIUM CHLORIDE, SODIUM LACTATE AND CALCIUM CHLORIDE: 600; 310; 30; 20 INJECTION, SOLUTION INTRAVENOUS at 22:52

## 2021-06-28 RX ADMIN — LIDOCAINE HYDROCHLORIDE,EPINEPHRINE BITARTRATE 2 ML: 15; .005 INJECTION, SOLUTION EPIDURAL; INFILTRATION; INTRACAUDAL; PERINEURAL at 18:41

## 2021-06-28 ASSESSMENT — MIFFLIN-ST. JEOR: SCORE: 1763.74

## 2021-06-28 NOTE — ANESTHESIA PREPROCEDURE EVALUATION
Anesthesia Pre-Procedure Evaluation    Patient: Chelo Garcia   MRN: [id number] : 1995        Preoperative Diagnosis: * No surgery found *   Procedure :      Past Medical History:   Diagnosis Date     Chronic midline low back pain without sciatica 2017     Depressive disorder 2017     Overweight 2017     Proteinuria     small protein on screening UA - recheck      Past Surgical History:   Procedure Laterality Date     AS INDUCED ABORTN BY D&C  2019      No Known Allergies   Social History     Tobacco Use     Smoking status: Never Smoker     Smokeless tobacco: Never Used   Substance Use Topics     Alcohol use: Not Currently     Comment: Socially, once a week      Wt Readings from Last 1 Encounters:   21 103.9 kg (229 lb)        Anesthesia Evaluation   Pt has had prior anesthetic.     No history of anesthetic complications       ROS/MED HX  ENT/Pulmonary:     (+) RONY risk factors, obese,     Neurologic: Comment: Mild LBP - neg neurologic ROS   (+) no peripheral neuropathy     Cardiovascular:  - neg cardiovascular ROS  (-) hypertension and taking anticoagulants/antiplatelets   METS/Exercise Tolerance:     Hematologic:  - neg hematologic  ROS     Musculoskeletal:  - neg musculoskeletal ROS     GI/Hepatic:  - neg GI/hepatic ROS     Renal/Genitourinary:  - neg Renal ROS     Endo:  - neg endo ROS     Psychiatric/Substance Use:     (+) psychiatric history depression     Infectious Disease:  - neg infectious disease ROS     Malignancy:       Other:            Physical Exam    Airway        Mallampati: II   TM distance: > 3 FB   Neck ROM: full   Mouth opening: > 3 cm    Respiratory Devices and Support         Dental  no notable dental history         Cardiovascular   cardiovascular exam normal          Pulmonary   pulmonary exam normal                OUTSIDE LABS:  CBC:   Lab Results   Component Value Date    WBC 11.2 (H) 2021    WBC 12.0 (H) 05/10/2021    HGB 10.5 (L) 2021     HGB 10.3 (L) 05/10/2021    HCT 32.9 (L) 06/28/2021    HCT 32.1 (L) 05/10/2021     06/28/2021     05/10/2021     BMP:   Lab Results   Component Value Date     04/18/2019     01/29/2018    POTASSIUM 4.5 04/18/2019    POTASSIUM 4.1 01/29/2018    CHLORIDE 110 (H) 04/18/2019    CHLORIDE 111 (H) 01/29/2018    CO2 26 04/18/2019    CO2 21 01/29/2018    BUN 12 04/18/2019    BUN 12 01/29/2018    CR 0.86 04/18/2019    CR 0.75 01/29/2018    GLC 87 04/18/2019    GLC 86 01/29/2018     COAGS: No results found for: PTT, INR, FIBR  POC:   Lab Results   Component Value Date    HCG NEGATIVE 07/18/2017     HEPATIC:   Lab Results   Component Value Date    ALBUMIN 3.9 04/18/2019    PROTTOTAL 7.7 04/18/2019    ALT 18 04/18/2019    AST 11 04/18/2019    ALKPHOS 86 04/18/2019    BILITOTAL 0.4 04/18/2019     OTHER:   Lab Results   Component Value Date    A1C 5.1 01/29/2018    TATA 9.2 04/18/2019    MAG 2.4 (H) 04/18/2019    TSH 0.69 04/18/2019       Anesthesia Plan    ASA Status:  3, emergent       Anesthesia Type: Epidural.              Consents    Anesthesia Plan(s) and associated risks, benefits, and realistic alternatives discussed. Questions answered and patient/representative(s) expressed understanding.     - Discussed with:  Patient      - Extended Intubation/Ventilatory Support Discussed: No.      - Patient is DNR/DNI Status: No    Use of blood products discussed: No .     Postoperative Care    Pain management: IV analgesics, Neuraxial analgesia, Multi-modal analgesia.   PONV prophylaxis: Ondansetron (or other 5HT-3), Dexamethasone or Solumedrol     Comments:    Emergent conversion of labor epidural to c/s for fetal distress. Working epidural in place, will use as primary c/s anesthetic.  GA backup PRN    Continuous Labor Epidural: Indication is for labor pain. Following an discussion of the procedure, expectations, and risks and benefits (risks including, but not limited to, nerve damage, infection,  bleeding/hematoma, spinal headache, partial or failed block), the patient appears to understand and consents to proceed. Questions were encouraged and answered.               Jack Jacinto MD

## 2021-06-28 NOTE — PROVIDER NOTIFICATION
06/28/21 1333   Provider Notification   Provider Name/Title Dr. Kim   Method of Notification In Department   Notification Reason Status Update   MD updated: went to collect ROM plus and fern and clear fluid visualized coming from vagina. Large puddle of fluid on pad. MD states no need to collect labs as patient is now grossly ruptured. Plan to admit and check cervix. Will update MD at that time for orders moving forward.

## 2021-06-28 NOTE — H&P
L&D History and Physical   2021  Chelo Garcia  [id number]      HPI: Chelo Garcia is a 26 year old  at 35w0d by LMP c/w 10w2d US, here vaginal leakage of fluid that started at 0400 today.     She states that she is feeling well today.  She denies fever, HA, blurred vision, Nausea, vomiting, CP, SOB, abdominal pain, constipation, diarrhea, vaginal bleeding, LOF, abnormal vaginal discharge, and acute swelling.  +FM.      Complications of Pregnancy:     Patient Active Problem List   Diagnosis     BMI 28.0-28.9,adult     Major depressive disorder, recurrent episode, severe (H)     Generalized anxiety disorder     Psychophysiological insomnia     Current occasional smoker     Cannabis abuse, episodic use     Encounter for triage in pregnant patient     Indication for care in labor or delivery         OBHX:   OB History    Para Term  AB Living   3 0 0 0 2 0   SAB TAB Ectopic Multiple Live Births   1 0 0 0 0      # Outcome Date GA Lbr Santi/2nd Weight Sex Delivery Anes PTL Lv   3 Current            2 SAB 2018           1 AB                MedicalHX:   Past Medical History:   Diagnosis Date     Chronic midline low back pain without sciatica 2017     Depressive disorder 2017     Overweight 2017     Proteinuria     small protein on screening UA - recheck       SurgicalHX:   Past Surgical History:   Procedure Laterality Date     AS INDUCED ABORTN BY D&C  2019       Medications:   No current facility-administered medications on file prior to encounter.   aspirin 81 MG EC tablet, Take 81 mg by mouth daily  buPROPion (WELLBUTRIN SR) 100 MG 12 hr tablet, Take 1.5 tablets (150 mg) by mouth daily  ferrous sulfate (FEROSUL) 325 (65 Fe) MG tablet, Take 325 mg by mouth daily (with breakfast)  Prenatal Vit-Fe Fumarate-FA (PRENATAL MULTIVITAMIN W/IRON) 27-0.8 MG tablet, Take 1 tablet by mouth daily  sertraline (ZOLOFT) 100 MG tablet, Take 1.5 tablets (150 mg) by mouth  daily        Allergies:  No Known Allergies    FamilyHX:  Family History   Problem Relation Age of Onset     Ovarian Cancer Mother         first had cervical caner , 2016 got ovarian caner at age 38      Cervical Cancer Mother      Mental Illness Mother      Bipolar Disorder Mother      Breast Cancer Other      No Known Problems Father        SocialHX:   Social History     Socioeconomic History     Marital status: Single     Spouse name: Raymond Kaplan     Number of children: None     Years of education: None     Highest education level: None   Occupational History     None   Social Needs     Financial resource strain: Not hard at all     Food insecurity     Worry: Never true     Inability: Never true     Transportation needs     Medical: No     Non-medical: No   Tobacco Use     Smoking status: Never Smoker     Smokeless tobacco: Never Used   Substance and Sexual Activity     Alcohol use: Not Currently     Comment: Socially, once a week     Drug use: Yes     Types: Marijuana     Comment: Occasional use     Sexual activity: Yes     Partners: Male     Comment: Pregnant    Lifestyle     Physical activity     Days per week: None     Minutes per session: None     Stress: None   Relationships     Social connections     Talks on phone: None     Gets together: None     Attends Jewish service: None     Active member of club or organization: None     Attends meetings of clubs or organizations: None     Relationship status: None     Intimate partner violence     Fear of current or ex partner: None     Emotionally abused: None     Physically abused: None     Forced sexual activity: None   Other Topics Concern     Parent/sibling w/ CABG, MI or angioplasty before 65F 55M? No   Social History Narrative    2019: Seen after gap of many months.  Lost her insurance.  Did not take birth control.  Got pregnant.  Had an  in early dec surgically.  Lost her 10-year-old half-sister on her maternal side she was 10 years  "old and apparently aspirated in the night.  Now working in [employer].  Continues to live with her roommate and her cat \" Dooby\".         2019: lives with room mate and cat yeni. Works one job. Close to maternal grandparents . Maternal grandfather not doing well due to chronic illness. Mother white bipolar  Not good relationship with her, dad form marcial, on parole for minor misdemeanor , getting deported due to immigration issues back to [facility]. Has a good friend and also roommate is her support. Has a safety plan in place. Does online counseling. Recently broke up boyfriend in 2019.        2018:got a cat         Graduated from [employer], 2017. 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    Works for a [employer] company        ROS: 10-point ROS negative except as in HPI     Physical Exam:  Vitals:    21 1300 21 1315 21 1330 21 1530   BP: 139/85 136/77 123/68 123/78   Resp: 16   16   Temp: 97.8  F (36.6  C)   98  F (36.7  C)   TempSrc: Oral   Oral   Weight: 103.9 kg (229 lb)      Height: 1.626 m (5' 4\")        GEN: resting comfortably in bed, in NAD   CVS: RRR, no murmur appreciated   PULMONARY: CTAB, no increased work of breathing, no cough/wheeze   ABDOMEN: soft, gravid, non-tender, non-distended  EXTREMITIES: trace edema, non tender to palpation  CVX: 2-3/100/-2   Presentation: cephalic by cervix check  EFW: aga    NST:  FHT: baseline 135, moderate variability, accelerations present, no decelerations  TOCO: q1-6 minutes      Ultrasounds:  Reviewed in Epic    Labs:   Results for orders placed or performed during the hospital encounter of 21 (from the past 24 hour(s))   Drug Screen Urine /   Result Value Ref Range    Amphetamine Qual Urine Negative NEG^Negative    Cannabinoids Qual Urine Positive, sent to Drug Analysis for confirmation. (A) NEG^Negative    Cocaine Qual Urine Negative NEG^Negative    Opiates Qualitative " Urine Negative NEG^Negative    Pcp Qual Urine Negative NEG^Negative   Asymptomatic SARS-CoV-2 COVID-19 Virus (Coronavirus) by PCR    Specimen: Nasopharyngeal   Result Value Ref Range    SARS-CoV-2 Virus Specimen Source Nasopharyngeal     SARS-CoV-2 PCR Result NEGATIVE     SARS-CoV-2 PCR Comment (Note)    CBC with platelets differential   Result Value Ref Range    WBC 11.2 (H) 4.0 - 11.0 10e9/L    RBC Count 3.75 (L) 3.8 - 5.2 10e12/L    Hemoglobin 10.5 (L) 11.7 - 15.7 g/dL    Hematocrit 32.9 (L) 35.0 - 47.0 %    MCV 88 78 - 100 fl    MCH 28.0 26.5 - 33.0 pg    MCHC 31.9 31.5 - 36.5 g/dL    RDW 15.2 (H) 10.0 - 15.0 %    Platelet Count 224 150 - 450 10e9/L    Diff Method Automated Method     % Neutrophils 78.9 %    % Lymphocytes 15.7 %    % Monocytes 4.3 %    % Eosinophils 0.4 %    % Basophils 0.2 %    % Immature Granulocytes 0.5 %    Nucleated RBCs 0 0 /100    Absolute Neutrophil 8.8 (H) 1.6 - 8.3 10e9/L    Absolute Lymphocytes 1.8 0.8 - 5.3 10e9/L    Absolute Monocytes 0.5 0.0 - 1.3 10e9/L    Absolute Eosinophils 0.1 0.0 - 0.7 10e9/L    Absolute Basophils 0.0 0.0 - 0.2 10e9/L    Abs Immature Granulocytes 0.1 0 - 0.4 10e9/L    Absolute Nucleated RBC 0.0    ABO/Rh type and screen   Result Value Ref Range    ABO O     RH(D) Pos     Antibody Screen Neg     Test Valid Only At [facility]        Specimen Expires 2021          Lab Results   Component Value Date    ABO O 2021    RH Pos 2021    AS Neg 2021    HEPBANG Nonreactive 2021    CHPCRT Negative 2019    GCPCRT Negative 2019    TREPAB Negative 2018    HGB 10.5 (L) 2021       GBS Status:   No results found for: GBS    Lab Results   Component Value Date    PAP NIL 2019       A/P: Chelo Garcia is a 26 year old female  at 35w0d by LMP c/w 10w2d US, here for  premature rupture of membranes    Admit for: PPROM. Collect routine labs. Monitor for signs and symptoms of chorioamnionitis.  Given unfavorable cervix (Forde's score of 7), will initiate Cytotec induction of labor for 2 doses, then followed by IV Pitocin  FHT: Category 1 tracing  GBS status: Unknown but GBS collected; given  gestation, will ppx treat with IV penicillin  Pain management: Comfort care measures as needed. If patient desires epidural, then will consult anesthesiology for administration. Appreciate coordination of care     John Kim MD  [facility]

## 2021-06-28 NOTE — PROVIDER NOTIFICATION
06/28/21 1315   Provider Notification   Provider Name/Title Dr. Kim   Method of Notification Phone   Dr. John Kim informed of patient arrival and assessment including the following:    Reason for maternal/fetal assessment leaking vaginal fluid. Pt reports a gush of fluid around 0400 and continued leaking since of clear fluid. Pad appeared wet upon arrival but not grossly rupture, no fluid seen on chux since arrival. Fetal status normal baseline, moderate variability, accelerations present and no decelerations, broken tracing at times. Patient occasionally sheila. Reviewed patient was here on 6/16 for same complaints, ROM plus came back positive, fern negative, and MVP 11.0 cm. Plan per provider/orders received for ROM plus and fern. Will update MD when lab results are back.

## 2021-06-28 NOTE — PLAN OF CARE
Patient informed of need for urine tox due to current or prior illicit or unprescribed drug use including maternal self-report.  Verbal consent obtained and maternal urine sent. Umbilical cord segment will be sent for toxicology.

## 2021-06-28 NOTE — TELEPHONE ENCOUNTER
Spoke with pt. States that she is continuing to have leaking from the vagina.     States that she will urinate and then sit there for 10 min and continue to leak.       Pt sent to [facility] L&D.     md on-call notified.     Tiarra WYNNE RN

## 2021-06-28 NOTE — PROVIDER NOTIFICATION
06/28/21 1420   Provider Notification   Provider Name/Title Dr. Kim   Method of Notification Phone   Request Evaluate - Remote   Notification Reason JAIME BARAHONA updated: JAIME 2.5/100/-2, david 7. MD states to administer two doses of oral cytotec and then recheck cervix. Start penicillin for GBS unknown.

## 2021-06-28 NOTE — ANESTHESIA PROCEDURE NOTES
Epidural catheter Procedure Note  Pre-Procedure   Staff -        Anesthesiologist:  Jack Jacinto MD       Performed By: anesthesiologist       Referred By: Julio       Location: OB       Pre-Anesthestic Checklist: patient identified, IV checked, risks and benefits discussed, informed consent, monitors and equipment checked, pre-op evaluation and at physician/surgeon's request  Timeout:       Correct Patient: Yes        Correct Procedure: Yes        Correct Site: Yes        Correct Position: Yes   Procedure Documentation  Procedure: epidural catheter       Diagnosis: labor       Patient Position: sitting       Patient Prep/Sterile Barriers: sterile gloves, mask, patient draped       Skin prep: Betadine       Local skin infiltrated with 3 mL of 1% lidocaine.        Insertion Site: L3-4. (midline approach).       Technique: LORT saline        JOHNIE at 5.5 cm.       Needle Type: [facility] [employer] [place] needle       Needle Gauge: 17.        Needle Length (Inches): 3.5        Catheter: 19 G.         Catheter threaded easily.         Threaded 11 cm at skin.         # of attempts: 1 and  # of redirects:  1    Assessment/Narrative         Paresthesias: No.      Test dose of 3 mL lidocaine 1.5% w/ 1:200,000 epinephrine at.         Test dose negative, 3 minutes after injection, for signs of intravascular, subdural, or intrathecal injection.       Insertion/Infusion Method: LORT saline       Aspiration negative for Heme or CSF via Epidural Catheter.    Comments:    Procedure tolerated well without apparent complications. Initial bolus of 0.25% bupivacaine given. Epidural infusion (0.125% bupi with fentanyl 2mcg/ml) started at 10 ml/hr with PCEA of 5 ml q15min with a max cumulative dose of 20 ml/hr. PCEA instructions given and use encouraged PRN. Epidural expectations reviewed and questions answered. Patient hemodynamically stable.  Patient and epidural functionality to be reassessed later via vital sign flowsheet, nursing  communication, and/or patient report.  Anesthesiologist immediately available for management.

## 2021-06-28 NOTE — PROVIDER NOTIFICATION
06/28/21 1800   Provider Notification   Provider Name/Title Dr. Kim   Method of Notification At Bedside   MD updated: Latest SVE 3/100/-2, david 8. Contractions have now spaced out some. Patient starting to get more uncomfortable, planning an epidural for pain. Orders received to start pitocin.

## 2021-06-29 LAB
GP B STREP DNA SPEC QL NAA+PROBE: POSITIVE
HGB BLD-MCNC: 8.5 G/DL (ref 11.7–15.7)
SPECIMEN SOURCE: ABNORMAL
T PALLIDUM AB SER QL: NONREACTIVE

## 2021-06-29 PROCEDURE — 258N000003 HC RX IP 258 OP 636: Performed by: OBSTETRICS & GYNECOLOGY

## 2021-06-29 PROCEDURE — 250N000011 HC RX IP 250 OP 636: Performed by: NURSE ANESTHETIST, CERTIFIED REGISTERED

## 2021-06-29 PROCEDURE — 120N000001 HC R&B MED SURG/OB

## 2021-06-29 PROCEDURE — 88307 TISSUE EXAM BY PATHOLOGIST: CPT | Mod: 26 | Performed by: PATHOLOGY

## 2021-06-29 PROCEDURE — 258N000003 HC RX IP 258 OP 636: Performed by: NURSE ANESTHETIST, CERTIFIED REGISTERED

## 2021-06-29 PROCEDURE — 999N000080 HC STATISTIC IP LACTATION SERVICES 16-30 MIN

## 2021-06-29 PROCEDURE — 272N000001 HC OR GENERAL SUPPLY STERILE: Performed by: OBSTETRICS & GYNECOLOGY

## 2021-06-29 PROCEDURE — 250N000011 HC RX IP 250 OP 636: Performed by: OBSTETRICS & GYNECOLOGY

## 2021-06-29 PROCEDURE — 250N000009 HC RX 250: Performed by: NURSE ANESTHETIST, CERTIFIED REGISTERED

## 2021-06-29 PROCEDURE — 370N000017 HC ANESTHESIA TECHNICAL FEE, PER MIN: Performed by: OBSTETRICS & GYNECOLOGY

## 2021-06-29 PROCEDURE — 271N000001 HC OR GENERAL SUPPLY NON-STERILE: Performed by: OBSTETRICS & GYNECOLOGY

## 2021-06-29 PROCEDURE — 85018 HEMOGLOBIN: CPT | Performed by: OBSTETRICS & GYNECOLOGY

## 2021-06-29 PROCEDURE — 710N000010 HC RECOVERY PHASE 1, LEVEL 2, PER MIN: Performed by: OBSTETRICS & GYNECOLOGY

## 2021-06-29 PROCEDURE — 250N000009 HC RX 250: Performed by: OBSTETRICS & GYNECOLOGY

## 2021-06-29 PROCEDURE — 360N000076 HC SURGERY LEVEL 3, PER MIN: Performed by: OBSTETRICS & GYNECOLOGY

## 2021-06-29 PROCEDURE — 36415 COLL VENOUS BLD VENIPUNCTURE: CPT | Performed by: OBSTETRICS & GYNECOLOGY

## 2021-06-29 PROCEDURE — 250N000013 HC RX MED GY IP 250 OP 250 PS 637: Performed by: OBSTETRICS & GYNECOLOGY

## 2021-06-29 PROCEDURE — 59510 CESAREAN DELIVERY: CPT | Performed by: OBSTETRICS & GYNECOLOGY

## 2021-06-29 PROCEDURE — 88307 TISSUE EXAM BY PATHOLOGIST: CPT | Mod: TC | Performed by: OBSTETRICS & GYNECOLOGY

## 2021-06-29 RX ORDER — FENTANYL CITRATE 50 UG/ML
25-50 INJECTION, SOLUTION INTRAMUSCULAR; INTRAVENOUS
Status: DISCONTINUED | OUTPATIENT
Start: 2021-06-29 | End: 2021-06-29 | Stop reason: HOSPADM

## 2021-06-29 RX ORDER — LIDOCAINE HYDROCHLORIDE 20 MG/ML
INJECTION, SOLUTION EPIDURAL; INFILTRATION; INTRACAUDAL; PERINEURAL PRN
Status: DISCONTINUED | OUTPATIENT
Start: 2021-06-29 | End: 2021-06-29

## 2021-06-29 RX ORDER — IBUPROFEN 800 MG/1
800 TABLET, FILM COATED ORAL EVERY 6 HOURS
Status: DISCONTINUED | OUTPATIENT
Start: 2021-06-30 | End: 2021-07-01 | Stop reason: HOSPADM

## 2021-06-29 RX ORDER — KETOROLAC TROMETHAMINE 30 MG/ML
INJECTION, SOLUTION INTRAMUSCULAR; INTRAVENOUS PRN
Status: DISCONTINUED | OUTPATIENT
Start: 2021-06-29 | End: 2021-06-29

## 2021-06-29 RX ORDER — NALOXONE HYDROCHLORIDE 0.4 MG/ML
0.4 INJECTION, SOLUTION INTRAMUSCULAR; INTRAVENOUS; SUBCUTANEOUS
Status: DISCONTINUED | OUTPATIENT
Start: 2021-06-29 | End: 2021-07-01 | Stop reason: HOSPADM

## 2021-06-29 RX ORDER — ONDANSETRON 2 MG/ML
4 INJECTION INTRAMUSCULAR; INTRAVENOUS EVERY 6 HOURS PRN
Status: DISCONTINUED | OUTPATIENT
Start: 2021-06-29 | End: 2021-07-01 | Stop reason: HOSPADM

## 2021-06-29 RX ORDER — CEFAZOLIN SODIUM 1 G/3ML
1 INJECTION, POWDER, FOR SOLUTION INTRAMUSCULAR; INTRAVENOUS EVERY 8 HOURS
Status: COMPLETED | OUTPATIENT
Start: 2021-06-29 | End: 2021-06-30

## 2021-06-29 RX ORDER — NALBUPHINE HYDROCHLORIDE 10 MG/ML
2.5-5 INJECTION, SOLUTION INTRAMUSCULAR; INTRAVENOUS; SUBCUTANEOUS EVERY 6 HOURS PRN
Status: DISCONTINUED | OUTPATIENT
Start: 2021-06-29 | End: 2021-07-01 | Stop reason: HOSPADM

## 2021-06-29 RX ORDER — OXYTOCIN/0.9 % SODIUM CHLORIDE 30/500 ML
100 PLASTIC BAG, INJECTION (ML) INTRAVENOUS CONTINUOUS
Status: DISCONTINUED | OUTPATIENT
Start: 2021-06-29 | End: 2021-06-30

## 2021-06-29 RX ORDER — MAGNESIUM HYDROXIDE 1200 MG/15ML
LIQUID ORAL PRN
Status: DISCONTINUED | OUTPATIENT
Start: 2021-06-29 | End: 2021-06-29

## 2021-06-29 RX ORDER — AZITHROMYCIN 500 MG/5ML
500 INJECTION, POWDER, LYOPHILIZED, FOR SOLUTION INTRAVENOUS
Status: COMPLETED | OUTPATIENT
Start: 2021-06-29 | End: 2021-06-29

## 2021-06-29 RX ORDER — ONDANSETRON 2 MG/ML
4 INJECTION INTRAMUSCULAR; INTRAVENOUS EVERY 30 MIN PRN
Status: DISCONTINUED | OUTPATIENT
Start: 2021-06-29 | End: 2021-07-01 | Stop reason: HOSPADM

## 2021-06-29 RX ORDER — MISOPROSTOL 200 UG/1
800 TABLET ORAL ONCE
Status: COMPLETED | OUTPATIENT
Start: 2021-06-29 | End: 2021-06-29

## 2021-06-29 RX ORDER — SIMETHICONE 80 MG
80 TABLET,CHEWABLE ORAL 4 TIMES DAILY PRN
Status: DISCONTINUED | OUTPATIENT
Start: 2021-06-29 | End: 2021-07-01 | Stop reason: HOSPADM

## 2021-06-29 RX ORDER — MODIFIED LANOLIN
OINTMENT (GRAM) TOPICAL
Status: DISCONTINUED | OUTPATIENT
Start: 2021-06-29 | End: 2021-07-01 | Stop reason: HOSPADM

## 2021-06-29 RX ORDER — KETOROLAC TROMETHAMINE 30 MG/ML
30 INJECTION, SOLUTION INTRAMUSCULAR; INTRAVENOUS EVERY 6 HOURS
Status: COMPLETED | OUTPATIENT
Start: 2021-06-29 | End: 2021-06-29

## 2021-06-29 RX ORDER — FENTANYL CITRATE 50 UG/ML
INJECTION, SOLUTION INTRAMUSCULAR; INTRAVENOUS PRN
Status: DISCONTINUED | OUTPATIENT
Start: 2021-06-29 | End: 2021-06-29

## 2021-06-29 RX ORDER — BUPROPION HYDROCHLORIDE 150 MG/1
150 TABLET, EXTENDED RELEASE ORAL DAILY
Status: DISCONTINUED | OUTPATIENT
Start: 2021-06-29 | End: 2021-07-01 | Stop reason: HOSPADM

## 2021-06-29 RX ORDER — OXYTOCIN/0.9 % SODIUM CHLORIDE 30/500 ML
340 PLASTIC BAG, INJECTION (ML) INTRAVENOUS CONTINUOUS PRN
Status: DISCONTINUED | OUTPATIENT
Start: 2021-06-29 | End: 2021-07-01 | Stop reason: HOSPADM

## 2021-06-29 RX ORDER — LIDOCAINE 40 MG/G
CREAM TOPICAL
Status: DISCONTINUED | OUTPATIENT
Start: 2021-06-29 | End: 2021-07-01 | Stop reason: HOSPADM

## 2021-06-29 RX ORDER — NALOXONE HYDROCHLORIDE 0.4 MG/ML
0.2 INJECTION, SOLUTION INTRAMUSCULAR; INTRAVENOUS; SUBCUTANEOUS
Status: DISCONTINUED | OUTPATIENT
Start: 2021-06-29 | End: 2021-07-01 | Stop reason: HOSPADM

## 2021-06-29 RX ORDER — DEXTROSE, SODIUM CHLORIDE, SODIUM LACTATE, POTASSIUM CHLORIDE, AND CALCIUM CHLORIDE 5; .6; .31; .03; .02 G/100ML; G/100ML; G/100ML; G/100ML; G/100ML
INJECTION, SOLUTION INTRAVENOUS CONTINUOUS
Status: DISCONTINUED | OUTPATIENT
Start: 2021-06-29 | End: 2021-06-30

## 2021-06-29 RX ORDER — EPHEDRINE SULFATE 50 MG/ML
5 INJECTION, SOLUTION INTRAMUSCULAR; INTRAVENOUS; SUBCUTANEOUS
Status: DISCONTINUED | OUTPATIENT
Start: 2021-06-29 | End: 2021-07-01 | Stop reason: HOSPADM

## 2021-06-29 RX ORDER — LIDOCAINE 40 MG/G
CREAM TOPICAL
Status: DISCONTINUED | OUTPATIENT
Start: 2021-06-29 | End: 2021-06-29

## 2021-06-29 RX ORDER — ALBUTEROL SULFATE 0.83 MG/ML
2.5 SOLUTION RESPIRATORY (INHALATION) EVERY 4 HOURS PRN
Status: DISCONTINUED | OUTPATIENT
Start: 2021-06-29 | End: 2021-06-29 | Stop reason: HOSPADM

## 2021-06-29 RX ORDER — MEPERIDINE HYDROCHLORIDE 25 MG/ML
12.5 INJECTION INTRAMUSCULAR; INTRAVENOUS; SUBCUTANEOUS
Status: DISCONTINUED | OUTPATIENT
Start: 2021-06-29 | End: 2021-07-01 | Stop reason: HOSPADM

## 2021-06-29 RX ORDER — AMOXICILLIN 250 MG
2 CAPSULE ORAL 2 TIMES DAILY
Status: DISCONTINUED | OUTPATIENT
Start: 2021-06-29 | End: 2021-07-01 | Stop reason: HOSPADM

## 2021-06-29 RX ORDER — MORPHINE SULFATE 1 MG/ML
INJECTION, SOLUTION EPIDURAL; INTRATHECAL; INTRAVENOUS PRN
Status: DISCONTINUED | OUTPATIENT
Start: 2021-06-29 | End: 2021-06-29

## 2021-06-29 RX ORDER — DEXAMETHASONE SODIUM PHOSPHATE 4 MG/ML
INJECTION, SOLUTION INTRA-ARTICULAR; INTRALESIONAL; INTRAMUSCULAR; INTRAVENOUS; SOFT TISSUE PRN
Status: DISCONTINUED | OUTPATIENT
Start: 2021-06-29 | End: 2021-06-29

## 2021-06-29 RX ORDER — OXYCODONE HYDROCHLORIDE 5 MG/1
5-10 TABLET ORAL EVERY 4 HOURS PRN
Status: DISCONTINUED | OUTPATIENT
Start: 2021-06-29 | End: 2021-07-01 | Stop reason: HOSPADM

## 2021-06-29 RX ORDER — METHYLERGONOVINE MALEATE 0.2 MG/ML
INJECTION INTRAVENOUS PRN
Status: DISCONTINUED | OUTPATIENT
Start: 2021-06-29 | End: 2021-06-29

## 2021-06-29 RX ORDER — FERROUS GLUCONATE 324(38)MG
324 TABLET ORAL
Status: DISCONTINUED | OUTPATIENT
Start: 2021-06-30 | End: 2021-06-30

## 2021-06-29 RX ORDER — TRANEXAMIC ACID 10 MG/ML
1 INJECTION, SOLUTION INTRAVENOUS EVERY 30 MIN PRN
Status: DISCONTINUED | OUTPATIENT
Start: 2021-06-29 | End: 2021-07-01 | Stop reason: HOSPADM

## 2021-06-29 RX ORDER — AMOXICILLIN 250 MG
1 CAPSULE ORAL 2 TIMES DAILY
Status: DISCONTINUED | OUTPATIENT
Start: 2021-06-29 | End: 2021-07-01 | Stop reason: HOSPADM

## 2021-06-29 RX ORDER — DIPHENHYDRAMINE HYDROCHLORIDE 50 MG/ML
INJECTION INTRAMUSCULAR; INTRAVENOUS PRN
Status: DISCONTINUED | OUTPATIENT
Start: 2021-06-29 | End: 2021-06-29

## 2021-06-29 RX ORDER — ACETAMINOPHEN 325 MG/1
975 TABLET ORAL EVERY 6 HOURS
Status: DISCONTINUED | OUTPATIENT
Start: 2021-06-29 | End: 2021-07-01 | Stop reason: HOSPADM

## 2021-06-29 RX ORDER — LABETALOL HYDROCHLORIDE 5 MG/ML
10 INJECTION, SOLUTION INTRAVENOUS
Status: DISCONTINUED | OUTPATIENT
Start: 2021-06-29 | End: 2021-06-29 | Stop reason: HOSPADM

## 2021-06-29 RX ORDER — BISACODYL 10 MG
10 SUPPOSITORY, RECTAL RECTAL DAILY PRN
Status: DISCONTINUED | OUTPATIENT
Start: 2021-07-01 | End: 2021-07-01 | Stop reason: HOSPADM

## 2021-06-29 RX ORDER — PRENATAL VIT/IRON FUM/FOLIC AC 27MG-0.8MG
1 TABLET ORAL DAILY
Status: DISCONTINUED | OUTPATIENT
Start: 2021-06-29 | End: 2021-07-01 | Stop reason: HOSPADM

## 2021-06-29 RX ORDER — FERROUS SULFATE 325(65) MG
325 TABLET ORAL
Status: DISCONTINUED | OUTPATIENT
Start: 2021-06-29 | End: 2021-07-01 | Stop reason: HOSPADM

## 2021-06-29 RX ORDER — HYDROCORTISONE 2.5 %
CREAM (GRAM) TOPICAL 3 TIMES DAILY PRN
Status: DISCONTINUED | OUTPATIENT
Start: 2021-06-29 | End: 2021-07-01 | Stop reason: HOSPADM

## 2021-06-29 RX ORDER — HYDRALAZINE HYDROCHLORIDE 20 MG/ML
2.5-5 INJECTION INTRAMUSCULAR; INTRAVENOUS EVERY 10 MIN PRN
Status: DISCONTINUED | OUTPATIENT
Start: 2021-06-29 | End: 2021-06-29 | Stop reason: HOSPADM

## 2021-06-29 RX ORDER — ONDANSETRON 4 MG/1
4 TABLET, ORALLY DISINTEGRATING ORAL EVERY 30 MIN PRN
Status: DISCONTINUED | OUTPATIENT
Start: 2021-06-29 | End: 2021-07-01 | Stop reason: HOSPADM

## 2021-06-29 RX ORDER — HYDROMORPHONE HYDROCHLORIDE 1 MG/ML
.3-.5 INJECTION, SOLUTION INTRAMUSCULAR; INTRAVENOUS; SUBCUTANEOUS EVERY 10 MIN PRN
Status: DISCONTINUED | OUTPATIENT
Start: 2021-06-29 | End: 2021-07-01 | Stop reason: HOSPADM

## 2021-06-29 RX ORDER — OXYTOCIN/0.9 % SODIUM CHLORIDE 30/500 ML
PLASTIC BAG, INJECTION (ML) INTRAVENOUS PRN
Status: DISCONTINUED | OUTPATIENT
Start: 2021-06-29 | End: 2021-06-29

## 2021-06-29 RX ORDER — OXYTOCIN 10 [USP'U]/ML
10 INJECTION, SOLUTION INTRAMUSCULAR; INTRAVENOUS
Status: DISCONTINUED | OUTPATIENT
Start: 2021-06-29 | End: 2021-07-01 | Stop reason: HOSPADM

## 2021-06-29 RX ORDER — SODIUM CHLORIDE, SODIUM LACTATE, POTASSIUM CHLORIDE, CALCIUM CHLORIDE 600; 310; 30; 20 MG/100ML; MG/100ML; MG/100ML; MG/100ML
INJECTION, SOLUTION INTRAVENOUS CONTINUOUS
Status: DISCONTINUED | OUTPATIENT
Start: 2021-06-29 | End: 2021-06-30

## 2021-06-29 RX ADMIN — DOCUSATE SODIUM AND SENNOSIDES 1 TABLET: 8.6; 5 TABLET, FILM COATED ORAL at 08:45

## 2021-06-29 RX ADMIN — Medication 100 ML/HR: at 03:09

## 2021-06-29 RX ADMIN — DEXAMETHASONE SODIUM PHOSPHATE 4 MG: 4 INJECTION, SOLUTION INTRA-ARTICULAR; INTRALESIONAL; INTRAMUSCULAR; INTRAVENOUS; SOFT TISSUE at 01:42

## 2021-06-29 RX ADMIN — KETOROLAC TROMETHAMINE 30 MG: 30 INJECTION, SOLUTION INTRAMUSCULAR; INTRAVENOUS at 08:45

## 2021-06-29 RX ADMIN — DOCUSATE SODIUM AND SENNOSIDES 1 TABLET: 8.6; 5 TABLET, FILM COATED ORAL at 19:40

## 2021-06-29 RX ADMIN — ACETAMINOPHEN 975 MG: 325 TABLET, FILM COATED ORAL at 12:19

## 2021-06-29 RX ADMIN — SERTRALINE HYDROCHLORIDE 150 MG: 100 TABLET ORAL at 08:45

## 2021-06-29 RX ADMIN — ONDANSETRON 4 MG: 2 INJECTION INTRAMUSCULAR; INTRAVENOUS at 01:42

## 2021-06-29 RX ADMIN — BUPROPION HYDROCHLORIDE 150 MG: 150 TABLET, EXTENDED RELEASE ORAL at 08:45

## 2021-06-29 RX ADMIN — SODIUM CHLORIDE, SODIUM LACTATE, POTASSIUM CHLORIDE, CALCIUM CHLORIDE AND DEXTROSE MONOHYDRATE: 5; 600; 310; 30; 20 INJECTION, SOLUTION INTRAVENOUS at 08:30

## 2021-06-29 RX ADMIN — ACETAMINOPHEN 975 MG: 325 TABLET, FILM COATED ORAL at 05:44

## 2021-06-29 RX ADMIN — ACETAMINOPHEN 975 MG: 325 TABLET, FILM COATED ORAL at 18:46

## 2021-06-29 RX ADMIN — FENTANYL CITRATE 50 MCG: 50 INJECTION, SOLUTION INTRAMUSCULAR; INTRAVENOUS at 02:29

## 2021-06-29 RX ADMIN — KETOROLAC TROMETHAMINE 15 MG: 30 INJECTION, SOLUTION INTRAMUSCULAR at 02:29

## 2021-06-29 RX ADMIN — LIDOCAINE HYDROCHLORIDE 15 ML: 20 INJECTION, SOLUTION EPIDURAL; INFILTRATION; INTRACAUDAL; PERINEURAL at 01:34

## 2021-06-29 RX ADMIN — OXYTOCIN-SODIUM CHLORIDE 0.9% IV SOLN 30 UNIT/500ML 10 ML: 30-0.9/5 SOLUTION at 01:43

## 2021-06-29 RX ADMIN — MORPHINE SULFATE 4 MG: 1 INJECTION EPIDURAL; INTRATHECAL; INTRAVENOUS at 01:43

## 2021-06-29 RX ADMIN — KETOROLAC TROMETHAMINE 30 MG: 30 INJECTION, SOLUTION INTRAMUSCULAR; INTRAVENOUS at 20:38

## 2021-06-29 RX ADMIN — Medication 500 MG: at 01:40

## 2021-06-29 RX ADMIN — CEFAZOLIN 1 G: 1 INJECTION, POWDER, FOR SOLUTION INTRAMUSCULAR; INTRAVENOUS at 16:36

## 2021-06-29 RX ADMIN — PHENYLEPHRINE HYDROCHLORIDE 0.25 MCG/KG/MIN: 10 INJECTION INTRAVENOUS at 01:37

## 2021-06-29 RX ADMIN — LIDOCAINE HYDROCHLORIDE 5 ML: 20 INJECTION, SOLUTION EPIDURAL; INFILTRATION; INTRACAUDAL; PERINEURAL at 01:39

## 2021-06-29 RX ADMIN — METHYLERGONOVINE MALEATE 200 MCG: 0.2 INJECTION INTRAVENOUS at 01:51

## 2021-06-29 RX ADMIN — KETOROLAC TROMETHAMINE 30 MG: 30 INJECTION, SOLUTION INTRAMUSCULAR; INTRAVENOUS at 14:42

## 2021-06-29 RX ADMIN — MISOPROSTOL 800 MCG: 200 TABLET ORAL at 02:48

## 2021-06-29 RX ADMIN — DIPHENHYDRAMINE HYDROCHLORIDE 12.5 MG: 50 INJECTION, SOLUTION INTRAMUSCULAR; INTRAVENOUS at 02:12

## 2021-06-29 RX ADMIN — OXYTOCIN-SODIUM CHLORIDE 0.9% IV SOLN 30 UNIT/500ML 490 ML: 30-0.9/5 SOLUTION at 02:39

## 2021-06-29 RX ADMIN — LIDOCAINE HYDROCHLORIDE 3 ML: 20 INJECTION, SOLUTION EPIDURAL; INFILTRATION; INTRACAUDAL; PERINEURAL at 02:30

## 2021-06-29 RX ADMIN — CEFAZOLIN 1 G: 1 INJECTION, POWDER, FOR SOLUTION INTRAMUSCULAR; INTRAVENOUS at 08:44

## 2021-06-29 NOTE — PROVIDER NOTIFICATION
06/28/21 3978   Provider Notification   Provider Name/Title Dr. Kim   Method of Notification Phone   Notification Reason Labor Status;SVE;Decels     MD updated on patient status. Occasional variables noted with contractions so cervix was checked; 6.5/100/0. FSE applied and patient repositioned at 2242. Recurrent, deep variables noted since then; patient repositioned again with no change. Another reposition done and fluid bolus started & pitocin turned off. Moderate variability noted for most of the strip, but difficult to determine baseline currently. Pt sheila q1-2.5 minutes apart. Orders from MD to keep pitocin off for now and see how baby recovers. Will update MD when patient is complete & begins pushing, or unless otherwise needed.

## 2021-06-29 NOTE — PLAN OF CARE
Data: Chelo Garcia transferred to [id number] via cart at 0535. Baby transferred to NICU via radiant warmer after .  Action: Receiving unit notified of transfer: Yes. Patient and family notified of room change. Report given to LOUISE Jane at 0535. Belongings sent to receiving unit. Accompanied by Registered Nurse. Oriented patient to surroundings. Call light within reach. ID bands double-checked with receiving RN.  Response: Patient tolerated transfer and is stable.    Patients mobililty level scored using the bedside mobility assistance tool (BMAT). Patient is at a mobility level test number: 1. Mobility equipment used: slider board. Required assist of 4 staff members. Further use of BMAT scoring required.

## 2021-06-29 NOTE — LACTATION NOTE
"Lactation visit. 35 week  in NICU for respiratory support, but Chelo states, \"He is doing well.\" She has not pumped yet, but her plan is to breast feed . Pump and supplies at the bedside so assisted with set up and provided education on proper use, set up, cleaning and sanitizing of pump parts, importance of pumping every 2-3 hours around the clock, and use of sterile collection containers while  in NICU. Nipples noted to be flatter, hard to distinguish while pumping. Discussed the possibility of increasing flange size to 27mm if pumping uncomfortable. Encouraged her to call for assistance with pumping or questions as necessary, and made aware of lactation availability in NICU for feeding assistance as well.  "

## 2021-06-29 NOTE — ANESTHESIA POSTPROCEDURE EVALUATION
Patient: Chelo Garcia    Procedure(s):   SECTION    Diagnosis:35 weeks gestation of pregnancy [Z3A.35]  Diagnosis Additional Information: No value filed.    Anesthesia Type:  Epidural    Note:  Disposition: Inpatient   Postop Pain Control: Uneventful            Sign Out: Well controlled pain   PONV: No   Neuro/Psych: Uneventful            Sign Out: Acceptable/Baseline neuro status   Airway/Respiratory: Uneventful            Sign Out: Acceptable/Baseline resp. status   CV/Hemodynamics: Uneventful            Sign Out: Acceptable CV status   Other NRE: NONE   DID A NON-ROUTINE EVENT OCCUR? No    Event details/Postop Comments:    Epidural catheter removed in OR, tip intact.       Epidural-to- Updated ASA: 3 Emergent      Last vitals:  Vitals:    21 2245 21 2325 21 2350   BP:  132/85 135/84   Resp: 16 16    Temp: 98.1  F (36.7  C) 98  F (36.7  C)    SpO2: 98%         Last vitals prior to Anesthesia Care Transfer:  CRNA VITALS  2021 0221 - 2021 0301      2021             NIBP:  140/79    NIBP Mean:  103          Electronically Signed By: Jack Jacinto MD  2021  3:01 AM

## 2021-06-29 NOTE — ANESTHESIA CARE TRANSFER NOTE
Patient: Chelo Garcia    Procedure(s):   SECTION    Diagnosis: 35 weeks gestation of pregnancy [Z3A.35]  Diagnosis Additional Information: No value filed.    Anesthesia Type:   Epidural     Note:    Oropharynx: spontaneously breathing  Level of Consciousness: awake  Oxygen Supplementation: room air    Independent Airway: airway patency satisfactory and stable  Dentition: dentition unchanged  Vital Signs Stable: post-procedure vital signs reviewed and stable  Report to RN Given: handoff report given  Patient transferred to: Labor and Delivery  Comments: To L/D, report to RN.        Vitals: (Last set prior to Anesthesia Care Transfer)  CRNA VITALS  2021 0221 - 2021 0258      2021             NIBP:  140/79    NIBP Mean:  103        Electronically Signed By: LOUIE Armenta CRNA  2021  2:58 AM

## 2021-06-29 NOTE — PROVIDER NOTIFICATION
06/29/21 1424   Provider Notification   Provider Name/Title DR Moses    Method of Notification Phone   Notification Reason Other   Comments   Comments Verify craig catheter removal time    Craig may be removed today once able to mobilize to bathroom.

## 2021-06-29 NOTE — PLAN OF CARE
Patient transferred to mother baby unit around 0530. VSS at this time. Oriented to room. Partner at bedside. Resting. Will continue with plan of care.

## 2021-06-29 NOTE — PLAN OF CARE
VSS, Tylenol and Toradol effective for pain. IV antibiotics given as per order. BS faint, not yet passing gas CO. Incision dressing intact . Pressure dressing in pace CDI  Craig catheter patent and draining, order to keep in until post op day 1. Patient has been resting this morning , diet advanced to regular diet, at this time has only had fluids orally. IV fluids continue. Tolerated getting out of bed and has gone down to NICU to visit baby.   .Patients mobililty level scored using the bedside mobility assistance tool (BMAT). Patient is at a mobility level test number: 4. Mobility equipment used: none required. Required assist of 1 staff members. Further use of BMAT scoring not required  Patient able to mobilize with stand by assist , no dizziness, craig catheter may be removed later today as per MD.

## 2021-06-29 NOTE — PROVIDER NOTIFICATION
21 0118   Provider Notification   Provider Name/Title Dr. Kim   Method of Notification At Bedside     MD at bedside for evaluation. Recurrent deep variables noted with contractions, despite position changes. Several pushes attempted but fetal station not low enough to attempt a vacuum. MD discussed  & patient and FOB agree. Stat c-section called at 0126.

## 2021-06-29 NOTE — BRIEF OP NOTE
[facility]    Brief Operative Note    Pre-operative diagnosis: 1) 35 weeks and 1 day  gestation of pregnancy 2) Fetal intolerance of labor  Post-operative diagnosis Viable male infant, delivered.     Procedure: Stat primary low transverse  section   Surgeon: Surgeon(s) and Role:     * John Kim MD - Primary  Anesthesia: Epidural   Estimated blood loss: 882 mL   Drains: Servin catheter   Specimens:   ID Type Source Tests Collected by Time Destination   1 :  Placenta Placenta SEE PROVIDERS ORDERS John Kim MD 2021  1:45 AM    2 :  Cord blood Umbilical Cord OR DOCUMENTATION ONLY John Kim MD 2021  1:42 AM    3 :  Tissue Umbilical Cord SEE PROVIDERS ORDERS John Kim MD 2021  1:42 AM    4 :  Cord blood, arterial Umbilical Cord OR DOCUMENTATION ONLY John Kim MD 2021  1:42 AM    5 :  Cord blood, venous Umbilical Cord OR DOCUMENTATION ONLY John Kim MD 2021  1:42 AM      Findings:   Viable male infant delivered in cephalic presentation at 0142 on 2021. No nuchal cord. Clear amniotic fluid. Weight 5 lbs 5 oz. Apgar 6 and 8 at 1 and 5 minutes, respectively. Arterial pH 7.23, base excess 4.6. Normal appearing uterus, bilateral fallopian tubes and ovaries. No intraperitoneal adhesions noted.   Complications: None.    Technique: Operative Note to follow    John Kim MD  [facility]

## 2021-06-29 NOTE — PROVIDER NOTIFICATION
06/29/21 0053   Provider Notification   Provider Name/Title Dr. Kim   Method of Notification Phone   Request Evaluate in Person   Notification Reason Decels;SVE   Dr. Kim updated on pt status, SVE, VD with court of 70bpm with practice pushing.  Pt repositioned and FHR recovered, currently has moderate variability.  Primary RN will start pushing with pt but would like OB to head in to hospital.  Dr. Kim verbalized understanding, will start heading towards hospital.  Primary RN updated.

## 2021-06-29 NOTE — L&D DELIVERY NOTE
Delivery Summary    Chelo Garcia MRN# [id number]   Age: 26 year old YOB: 1995       Ms.Jasmine CAMILLE Garcia 26 year old  presented at 35w0d with  premature rupture of membranes   Her cervix on admission was 2-3/100/-2 at 1350, 6/28  Given unfavorable cervix status (Forde's score of 7), she was given one dose of oral cytotec 25 mcg.   She progressed to a favorable cervical dilation at 3/100/-2 at 1645.   Given her ruptured membrane status, Pitocin induction was ensued.   Pitocin max to 3 mU/min    She progressed to complete/+1 dilation at 0030,   Second stage of labor, however, was complicated by Category 3 fetal heart tracing with recurrent variable decelerations court to 70 bpm as well as prolonged decelerations to the 70 bpm.     Patient was counseled that given remoteness of delivery, that a  section would be recommended. Risks of  were reviewed including but not limited to  bleeding, infection, need for blood transfusion, injury to surrounding organs (ie bowel/intestines, bladder, ureters, major blood vessels and nerves)., which if injured, then will be identified and repaired at time of surgery or will be repaired by surgeons that specialize in those areas. Also reviewed unintended injuries going unnoticed at the time of surgery, and that would require additional surgery to be done to have them repaired. Also discussed was fetal injury and possible  hysterectomy for life threatening bleeding. Patient and  conveyed understanding of risks and agreed to proceed. Patient provided verbal consent.     Stat  was called. Proceeded to operating room in an expedient manner.   She underwent an uncomplicated primary low transverse  section.   Viable male infant delivered in cephalic presentation.   No nuchal cord present.    Clear amniotic fluid. Weight 5 lbs 5 oz. Apgar 6 and 8 at 1 and 5 minutes, respectively. Arterial pH 7.23, base  excess 4.6. Normal appearing uterus, bilateral fallopian tubes and ovaries. No intraperitoneal adhesions noted.   Placenta delivered spontaneously and sent for pathology due to  gestational age status.    mL     John Kim MD  [facility]          Lore Garcia [[id number]]    Labor Events     labor?: Yes   steroids: None  Labor Type: Augmentation  Predominate monitoring during 1st stage: continuous electronic fetal monitoring     Antibiotics received during labor?: Yes  Reason for Antibiotics: GBS  Antibiotics received for GBS: Penicillin  Antibiotics Given (GBS): Greater than 4 hours prior to delivery     Rupture date/time: 21 0400   Rupture type: Spontaneous rupture of membranes occuring during spontaneous labor or augmentation  Fluid color: Clear  Fluid odor: Normal     Induction date/time:     Cervical ripening date/time:     Indications for induction: Prolonged ROM     Augmentation: Oxytocin  Augmentation date/time: 21 1400   1:1 continuous labor support provided by?: RN Labor partogram used?: no      Delivery/Placenta Date and Time    Delivery Date: 21 Delivery Time:  1:42 AM    Other personnel present at delivery:  Provider Role   John Kim MD Obstetrician         Apgars    Living status: Living   1 Minute 5 Minute 10 Minute 15 Minute 20 Minute   Skin color: 1  1       Heart rate: 2  2       Reflex irritability: 1  2       Muscle tone: 1  2       Respiratory effort: 1  1       Total: 6  8       Apgars assigned by: DORIS HOFFMAN     Cord    Vessels: 3 Vessels    Cord Complications: None               Gases Sent?: Yes    Delayed cord clamping?: No        Resuscitation    Methods: Suctioning, Oxygen, NCPAP, Oximetry, Temp Skin Control, Temp Skin Probe, Nicko Puff   Care at Delivery: Called by Dr Kim to the stat c/section of  at 35+ weeks due to fetal heart tone. Infant was brought  "to the heated warmer immediately where he was stimulated and dried. PPV initiated at 30 sec about 4 sec when he started to cry, PPV stopped and CPAP continued with spontaneous breathing. Initial O2 sats in the 70s improving with increased O2 up to 40%, as sats improved O2 was weaned to 21% with continued CPAP. Tone still decreased, Pink. Was weighed and transferred to NICU for further management.    Elias Garrett, APRN-CNP 2021 2:19 AM       Measurements    Weight: 5 lb 5.7 oz Length: 1' 6.5\"   Head circumference: 31.5 cm       Labor Events and Shoulder Dystocia    Fetal Tracing Prior to Delivery: Category 3  Fetal Tracing Comments: recurrent variables and decelerations      Delivery (Maternal) (Provider to Complete) ([id number])       Blood Loss  Mother: Chelo Garcia #[id number]   Start of Mother's Information    IO Blood Loss  21 0130 - 21 0308    Total Surgical QBL Blood Loss (mL) Hospital Encounter 882 mL    Total  882 mL         End of Mother's Information  Mother: Chelo Garcia #[id number]          Delivery - Provider to Complete ([id number])    Attempted Delivery Types (Choose all that apply): Spontaneous Vaginal Delivery  Delivery Type (Choose the 1 that will go to the Birth History): , Low Transverse                    Priority: STAT    Specifics: Primary nulliparius   Indications for Primary: Fetal Status   Other personnel:  Provider Role   John Kim MD Obstetrician                Placenta    Removal: Spontaneous  Disposition: Pathology           Anesthesia    Method: Epidural  Cervical dilation at placement: 0-3                Presentation and Position    Presentation: Vertex    Position: Right Occiput Anterior                 John Kim MD  "

## 2021-06-30 LAB
CANNABINOIDS UR CFM-MCNC: 612 NG/ML
CARBOXYTHC/CREAT UR: 478 NG/MG{CREAT}
COPATH REPORT: NORMAL
HGB BLD-MCNC: 8.4 G/DL (ref 11.7–15.7)

## 2021-06-30 PROCEDURE — 120N000001 HC R&B MED SURG/OB

## 2021-06-30 PROCEDURE — 250N000011 HC RX IP 250 OP 636: Performed by: OBSTETRICS & GYNECOLOGY

## 2021-06-30 PROCEDURE — 250N000013 HC RX MED GY IP 250 OP 250 PS 637: Performed by: OBSTETRICS & GYNECOLOGY

## 2021-06-30 PROCEDURE — 85018 HEMOGLOBIN: CPT | Performed by: OBSTETRICS & GYNECOLOGY

## 2021-06-30 PROCEDURE — 36415 COLL VENOUS BLD VENIPUNCTURE: CPT | Performed by: OBSTETRICS & GYNECOLOGY

## 2021-06-30 RX ORDER — FENTANYL CITRATE 50 UG/ML
25-50 INJECTION, SOLUTION INTRAMUSCULAR; INTRAVENOUS
Status: DISCONTINUED | OUTPATIENT
Start: 2021-06-30 | End: 2021-07-01

## 2021-06-30 RX ADMIN — DOCUSATE SODIUM AND SENNOSIDES 2 TABLET: 8.6; 5 TABLET, FILM COATED ORAL at 20:31

## 2021-06-30 RX ADMIN — CEFAZOLIN 1 G: 1 INJECTION, POWDER, FOR SOLUTION INTRAMUSCULAR; INTRAVENOUS at 00:51

## 2021-06-30 RX ADMIN — ACETAMINOPHEN 975 MG: 325 TABLET, FILM COATED ORAL at 17:18

## 2021-06-30 RX ADMIN — FERROUS SULFATE TAB 325 MG (65 MG ELEMENTAL FE) 325 MG: 325 (65 FE) TAB at 07:50

## 2021-06-30 RX ADMIN — SIMETHICONE 80 MG: 80 TABLET, CHEWABLE ORAL at 06:29

## 2021-06-30 RX ADMIN — PRENATAL VITAMINS-IRON FUMARATE 27 MG IRON-FOLIC ACID 0.8 MG TABLET 1 TABLET: at 07:49

## 2021-06-30 RX ADMIN — BUPROPION HYDROCHLORIDE 150 MG: 150 TABLET, EXTENDED RELEASE ORAL at 07:50

## 2021-06-30 RX ADMIN — DOCUSATE SODIUM AND SENNOSIDES 1 TABLET: 8.6; 5 TABLET, FILM COATED ORAL at 07:51

## 2021-06-30 RX ADMIN — IBUPROFEN 800 MG: 800 TABLET, FILM COATED ORAL at 20:31

## 2021-06-30 RX ADMIN — IBUPROFEN 800 MG: 800 TABLET, FILM COATED ORAL at 05:11

## 2021-06-30 RX ADMIN — ACETAMINOPHEN 975 MG: 325 TABLET, FILM COATED ORAL at 00:51

## 2021-06-30 RX ADMIN — ACETAMINOPHEN 975 MG: 325 TABLET, FILM COATED ORAL at 06:29

## 2021-06-30 RX ADMIN — ACETAMINOPHEN 975 MG: 325 TABLET, FILM COATED ORAL at 23:22

## 2021-06-30 RX ADMIN — IBUPROFEN 800 MG: 800 TABLET, FILM COATED ORAL at 11:58

## 2021-06-30 RX ADMIN — SERTRALINE HYDROCHLORIDE 150 MG: 100 TABLET ORAL at 07:51

## 2021-06-30 NOTE — PLAN OF CARE
VSS. Assessment WNL. Voiding without difficulty. Incision covered with dressing with no drainage noted. Pain managed well with ibuprofen and tylenol. Baby on ipad in room. Breast pump in room for patient to use. Encouraged to call for help if needed. Will continue with plan of care.

## 2021-06-30 NOTE — PROGRESS NOTES
"[facility] Postop C/S Note    S:  Patient without complaints other than typical soreness.  Minimal lochia.  Flatus passed, tolerating Regular diet well    O:  Blood pressure 122/76, pulse 92, temperature 97.6  F (36.4  C), temperature source Oral, resp. rate 16, height 1.626 m (5' 4\"), weight 103.9 kg (229 lb), last menstrual period 10/26/2020, SpO2 97 %, unknown if currently breastfeeding.            Intake/Output Summary (Last 24 hours) at 6/30/2021 0736  Last data filed at 6/30/2021 0600  Gross per 24 hour   Intake 1486.75 ml   Output 2200 ml   Net -713.25 ml           Abdomen - Non-distended, Normoactive bowel sounds, soft, appropriately tender; Fundus firm, at umbilicus, nontender        Dressing/Incision - clean, dry, intact        Extremities - No calf tenderness    Hemoglobin   Date Value Ref Range Status   06/29/2021 8.5 (L) 11.7 - 15.7 g/dL Final   06/28/2021 10.5 (L) 11.7 - 15.7 g/dL Final   ]      A:   Postop Day# 1, s/p Primary LTCS - doing well        Postpartum Acute Blood loss anemia - no sx's, Hgb was from POD#0, repeat this AM pending.      P:  1)  Routine care        2) Check Hgb this AM.  Started on po Fe.        3)  Probable D/C 1-2 days.      Damien Rai MD            "

## 2021-06-30 NOTE — PLAN OF CARE
VS and assessment WNL.  Pt ambulating to bathroom, craig catheter removed at 1600 and voiding adequately without difficulty.  Pain well managed with Tylenol and Toradol.  IV saline locked, tolerating regular diet.  Incision covered with foam pressure dressing; clean, dry, and intact.  Pt pumping every 3 hours for infant in NICU.  Visit NICU x1 this shift and ipad in room.  Significant other was here earlier this evening and supportive.  Meeting expected goals.

## 2021-06-30 NOTE — PLAN OF CARE
Data: Vital signs within normal limits. Postpartum checks within normal limits - see flow record. Patient eating and drinking normally. Patient able to empty bladder independently and is up ambulating. No apparent signs of infection. Incision dressing intact, seen by DR Rai, dressing to remain in place until tomorrow. . Patient performing self cares and is able to care for infant down in NICU  Action: Patient medicated during the shift for pain. See MAR. Patient reassessed within 1 hour after each medication and pain was improved - patient stated she was comfortable. Patient education done about  See flow record.Patient continues to pump HGB 8.4 this morning , on oral iron.   Response: Positive attachment behaviors observed with infant. Support persons visitors present.   Plan: Anticipate discharge on 7/1.

## 2021-06-30 NOTE — PROVIDER NOTIFICATION
06/29/21 2100   Provider Notification   Provider Name/Title Dr. Inman   Method of Notification Phone   Request Evaluate-Remote   Notification Reason Lab Results   Notified of 8.5 hemoglobin, pt asymptomatic.  Orders received for Fergon tablet po 324 mg, once daily, starting tomorrow morning at 0900.   EPS Progress Note    S: patient laying in bed comfortably.  Her daughter is at bedside.  No SOB, chest pain, palpitations or dizziness today.      O: T(C): 36.7 (01-24-19 @ 10:36), Max: 36.8 (01-24-19 @ 05:30)  HR: 74 (01-24-19 @ 08:21) (62 - 74)  BP: 146/60 (01-24-19 @ 08:21) (114/49 - 152/68)  RR: 24 (01-24-19 @ 08:21) (15 - 26)  SpO2: 96% (01-24-19 @ 08:21) (94% - 99%)    TELE: atrial fibrillation with rates 60s-70s    PHYSICAL  General:  NAD        Chest:  CTA    Cardiac: irregularly irregular  Abdomen:   soft    Extremities: No edema  Skin: no rash noted, normal color and pigmentation  Psych: A&Ox3, normal affect and mood  Neuro: no deficit noted     LABS:                        9.4    6.6   )-----------( 136      ( 24 Jan 2019 06:03 )             31.3     143  |  98  |  46<H>  ----------------------------<  115<H>  3.8   |  35<H>  |  1.49<H>    Ca    9.3       Mg     1.9     TPro  7.0  /  Alb  3.6  /  TBili  0.4  /  DBili  x   /  AST  37  /  ALT  43  /  AlkPhos  107           MEDICATIONS:  atorvastatin 40 milliGRAM(s) Oral at bedtime  bisacodyl Suppository 10 milliGRAM(s) Rectal daily PRN  buDESOnide   0.5 milliGRAM(s) Respule 0.5 milliGRAM(s) Inhalation two times a day  furosemide   Injectable 80 milliGRAM(s) IV Push every 12 hours  hydrALAZINE 25 milliGRAM(s) Oral <User Schedule>  insulin lispro (HumaLOG) corrective regimen sliding scale   SubCutaneous Before meals and at bedtime  isosorbide   mononitrate ER Tablet (IMDUR) 60 milliGRAM(s) Oral daily  levothyroxine 100 MICROGram(s) Oral daily  metoprolol succinate ER 25 milliGRAM(s) Oral daily  montelukast 10 milliGRAM(s) Oral daily  pantoprazole    Tablet 40 milliGRAM(s) Oral before breakfast  tiotropium 18 MICROgram(s) Capsule 1 Capsule(s) Inhalation daily      ASSESSMENT/PLAN  89-year-old [place]guill[place] speaking female with AFib on Eliquis (cardioversion 1/2018 with early return to afib), LBBB, chronic systolic CHF (EF 20-25% on echo 1/2018; 30% today), CAD s/p remote PCI, HTN, HLD, DM, renal artery stenosis s/p R renal artery PTA 1/2018, CKD (baseline crea 1-2) anemia, hypothyroidism, b/l carotid endarterectomies, asthma, and emphysema on 2L home O2, who presented with worsening SOB.  Lengthy discussion with family yesterday and the decision was made to proceed with BiV pacemaker.  She is currently getting IV lasix; but today her lungs are clear and she is laying relatively flat.  Will reassess her fluid status in the am but will anticipate BiV PPM tomorrow.  She was consented with [facility]  (see consent for information).  All of her questions were answered.  last dose of Eliquis 1/24 am (team aware).  she notes going into flash pulmonary edema during a cardiac catheterization - this sounds like flash pulmonary edema and not an allergic reaction (she was never told she had an allergy either).  Will discuss with Dr. Connolly - to assure he doesnt want to premedicate her.  Please keep NPO after midnight.  Please call [phone] with any questions or concerns.

## 2021-07-01 VITALS
OXYGEN SATURATION: 97 % | WEIGHT: 229 LBS | BODY MASS INDEX: 39.09 KG/M2 | HEART RATE: 96 BPM | SYSTOLIC BLOOD PRESSURE: 127 MMHG | TEMPERATURE: 98 F | HEIGHT: 64 IN | RESPIRATION RATE: 16 BRPM | DIASTOLIC BLOOD PRESSURE: 71 MMHG

## 2021-07-01 PROCEDURE — 250N000013 HC RX MED GY IP 250 OP 250 PS 637: Performed by: OBSTETRICS & GYNECOLOGY

## 2021-07-01 RX ADMIN — DOCUSATE SODIUM AND SENNOSIDES 1 TABLET: 8.6; 5 TABLET, FILM COATED ORAL at 08:49

## 2021-07-01 RX ADMIN — SERTRALINE HYDROCHLORIDE 150 MG: 100 TABLET ORAL at 08:50

## 2021-07-01 RX ADMIN — IBUPROFEN 800 MG: 800 TABLET, FILM COATED ORAL at 02:34

## 2021-07-01 RX ADMIN — IBUPROFEN 800 MG: 800 TABLET, FILM COATED ORAL at 08:50

## 2021-07-01 RX ADMIN — PRENATAL VITAMINS-IRON FUMARATE 27 MG IRON-FOLIC ACID 0.8 MG TABLET 1 TABLET: at 08:50

## 2021-07-01 RX ADMIN — FERROUS SULFATE TAB 325 MG (65 MG ELEMENTAL FE) 325 MG: 325 (65 FE) TAB at 08:50

## 2021-07-01 RX ADMIN — ACETAMINOPHEN 975 MG: 325 TABLET, FILM COATED ORAL at 05:29

## 2021-07-01 RX ADMIN — BUPROPION HYDROCHLORIDE 150 MG: 150 TABLET, EXTENDED RELEASE ORAL at 08:50

## 2021-07-01 NOTE — CONSULTS
[facility]  MATERNAL CHILD HEALTH   INITIAL NICU PSYCHOSOCIAL ASSESSMENT     DATA:     Reason for Social Work Consult: 35.1 W male in NICU for Respiratory distress, STAT . Baby was due .    Presenting Information: SYLVESTER met with Chelo and Raymond who are partnered couple who reside in [place]. Georgi is Chelo's first child and Raymond has 2 children, a daughter almost 2 and a son almost 4 who they see on weekends. The couple is prepared for Georgi at home and are not on WIC.    Social Support: Both families are nearby and supportive.    Insurance: Commercial     Mental Health History: Chelo has history of depression and has been on Wellbutrin and Zoloft since 2018. She reports that these manage her symptoms very well. She reports her s/s were isolating and having low motivation. She scored 1 on EPDS and has no concerns for PMAD.     History of Postpartum Mood Disorders: N/A    Chemical Health History: Chelo tested positive for THC upon admission. She reports she used a day before she came to deliver. She used this for sleep. SYLVESTER informed couple that per state mandate SYLVESTER will be making a report to [facility].      INTERVENTION:       SYLVESTER completed chart review and collaborated with the multidisciplinary team.     Psychosocial Assessment     Introduction to Maternal Child Health  role and scope of practice     Discussed NICU experience and gave NICU welcome card    Reviewed Hospital and Community Resources     SYLVESTER filed report with [facility] CPS    Assessed Chemical Health History and Current Symptoms     Assessed Mental Health History and Current Symptoms     Identified stressors, barriers and family concerns     Provided support and active empathetic listening and validation.     Provided psychoeducation on  mood and anxiety disorders, assessed for any current symptoms or history    Provided brochure Depression and Anxiety During and  after Pregnancy.     ASSESSMENT:     Coping: Well    Affect: Appropriate, with good eye contact.    Mood:  Appropriate, stable and calm.    Motivation/Ability to Access Services: Independent in accessing services.    Assessment of Support System: Stable and involved.    Level of engagement with SW: Engaged and appropriate. Able to seek out SW when needs arise.     Family s understanding of baby s medical situation: Appropriate understanding.    Family and parent/infant interactions: Parents seem supportive of each other and are bonding with pt as they are able.     Assessment of parental risk for PMAD: Higher than average risk given history of depression and unexpected early delivery and NICU admission.    Strengths: Caring family, willingness to accept help.    Vulnerabilities: Non-compliance with treatment recommendations, using THC in pregnancy.    Identified Barriers:   None at this time     PLAN:     SW will continue to follow throughout pt's Maternal-Child Health Journey as needs arise. SW will continue to collaborate with the multidisciplinary team.    Jeff Asher [facility] Case Management  Inpatient   Maternal Child and ED  [facility]    [phone]

## 2021-07-01 NOTE — DISCHARGE SUMMARY
DISCHARGE SUMMARY    Chelo Garcia  1995      Admission date:  2021  Discharge date: 2021    Admission diagnosis:   - Pregnancy at 35w1d  - PPROM    Discharge diagnosis:   - Status post  delivery for fetal intolerance  - Iron deficiency anemia, clinically stable    Reason for Admission:   This is a 26 year old  35w1d who presented to Labor and Delivery and was admitted for PPROM.  The patient's pregnancy had been complicated by BMI >35, depression on multiple meds, and THC use.    Hospital Course:   The patient was admitted and underwent attempted induction of labor c/b fetal intolerance and  delivery. Baby's hospital course was complicated by NICU stay.  The patient's postpartum course was uncomplicated. The patient is breast feeding.  She is meeting discharge criteria and desires to be discharged home today, POD# 2        .    Discharge Exam:    Vitals:    21 1805 21 2053 21 2322 21 0850   BP: (!) 147/87 123/66 114/66 127/71   Pulse: 109 103 98 96   Resp: 20  18 16   Temp: 97.6  F (36.4  C)  98.1  F (36.7  C) 98  F (36.7  C)   TempSrc: Oral  Oral Oral   SpO2:       Weight:       Height:           Constitutional: healthy, alert and no distress    Abdomen:  Uterine fundus is firm, non-tender and at the level of the umbilicus   Incision: C/D/I   LE:  mild edema, non-tender      LABS:  Hemoglobin   Date Value Ref Range Status   2021 8.4 (L) 11.7 - 15.7 g/dL Final   2021 8.5 (L) 11.7 - 15.7 g/dL Final     No results found for: RUBELLAABIGG   Lab Results   Component Value Date    ABO O 2021           Lab Results   Component Value Date    RH Pos 2021                Discharge Medications:       Review of your medicines      CONTINUE these medicines which have NOT CHANGED      Dose / Directions   buPROPion 100 MG 12 hr tablet  Commonly known as: WELLBUTRIN SR  Used for: Severe episode of recurrent major depressive disorder,  without psychotic features (H), Generalized anxiety disorder      Dose: 150 mg  Take 1.5 tablets (150 mg) by mouth daily  Quantity: 45 tablet  Refills: 0     ferrous sulfate 325 (65 Fe) MG tablet  Commonly known as: FEROSUL      Dose: 325 mg  Take 325 mg by mouth daily (with breakfast)  Refills: 0     prenatal multivitamin w/iron 27-0.8 MG tablet      Dose: 1 tablet  Take 1 tablet by mouth daily  Refills: 0     sertraline 100 MG tablet  Commonly known as: ZOLOFT  Used for: Severe episode of recurrent major depressive disorder, without psychotic features (H), Generalized anxiety disorder      Dose: 150 mg  Take 1.5 tablets (150 mg) by mouth daily  Quantity: 45 tablet  Refills: 1        STOP taking    aspirin 81 MG EC tablet               Patient Instructions:  Activity: the patient was instructed to have pelvic rest for 6 weeks.  Nothing in the vagina. No tampons, douching, or intercourse.  No driving while on narcotics.  If delivered by , no heavy lifting, pushing, or pulling greater than 15 lbs for 6 weeks. She should notify her physician with temperature greater than 100.4 degrees, and if she is having any brisk vaginal bleeding where she soaks through greater than 1 pad per hour for more than 2 hours, if any area on her breast becomes red or painful, or if her pain is no longer controlled by pain medications.  Diet as tolerated.  Discussed symptoms of post-partum blues and depression and urged her to contact us immediately should that become a concern.    If any history of hypertension in the hospital, please either check your blood pressure at home, or make an appointment for a follow up nurse blood pressure check in clinic within 5-7 days of discharge. If any severe headache, upper abdominal pain, or significant visual changes with headache, please call the clinic.    Follow-up with primary OB in 6 weeks for a postpartum visit.    Continue with iron supplements postpartum for a history of iron  deficiency anemia.    Kim Angela MD  July 1, 2021

## 2021-07-01 NOTE — DISCHARGE INSTRUCTIONS
Postop  Birth Instructions Follow up in clinic in 6 weeks.    Activity       Do not lift more than 10 pounds for 6 weeks after surgery.  Ask family and friends for help when you need it.    No driving until you have stopped taking your pain medications (usually two weeks after surgery).    No heavy exercise or activity for 6 weeks.  Don't do anything that will put a strain on your surgery site.    Don't strain when using the toilet.  Your care team may prescribe a stool softener if you have problems with your bowel movements.     To care for your incision:       Keep the incision clean and dry.    Do not soak your incision in water. No swimming or hot tubs until it has fully healed. You may soak in the bathtub if the water level is below your incision.    Do not use peroxide, gel, cream, lotion, or ointment on your incision.    Adjust your clothes to avoid pressure on your surgery site (check the elastic in your underwear for example).     You may see a small amount of clear or pink drainage and this is normal.  Check with your health care provider:       If the drainage increases or has an odor.    If the incision reddens, you have swelling, or develop a rash.    If you have increased pain and the medicine we prescribed doesn't help.    If you have a fever above 100.4 F (38 C) with or without chills when placing thermometer under your tongue.   The area around your incision (surgery wound), will feel numb.  This is normal. The numbness should go away in less than a year.     Keep your hands clean:  Always wash your hands before touching your incision (surgery wound). This helps reduce your risk of infection. If your hands aren't dirty, you may use an alcohol hand-rub to clean your hands. Keep your nails clean and short.    Call your healthcare provider if you have any of these symptoms:       You soak a sanitary pad with blood within 1 hour, or you see blood clots larger than a golf ball.    Bleeding that  lasts more than 6 weeks.    Vaginal discharge that smells bad.    Severe pain, cramping or tenderness in your lower belly area.    A need to urinate more frequently (use the toilet more often), more urgently (use the toilet very quickly), or it burns when you urinate.    Nausea and vomiting.    Redness, swelling or pain around a vein in your leg.    Problems breastfeeding or a red or painful area on your breast.    Chest pain and cough or are gasping for air.    Problems with coping with sadness, anxiety or depression. If you have concerns about hurting yourself or the baby, call your provider immediately.      You have questions or concerns after you return home.

## 2021-07-01 NOTE — PLAN OF CARE
VSS; slightly tachycardic.  BP was slightly elevated after patient was walking, rechecked and WNL.  Fundus and bleeding WNL.  Voiding without difficulty. Incision covered with foam pressure dressing; clean, dry, and intact.  Per MD remove dressing in AM.  Pain well managed with IBU and Tylenol.  Pumping every 3 hours for infant in NICU.  Patient walked to NICU x 2 this shift, and ipad in room.  Patient performing all self cares and meeting expected goals.

## 2021-07-01 NOTE — PLAN OF CARE
"Pt able to get some rest between cares during the night.  States ordered pain medications keeping her comfortable.  Voiding sufficient amts.  Denies feeling lightheaded or dizzy when up ambulating. Walked down to NICU X 1 to pump and visit baby; pt states she pumped enough \"to give to baby this time\".  Watching  on iPad.  No support person present this shift; independent w/ cares.  "

## 2021-07-01 NOTE — PLAN OF CARE
Data: Vital signs within normal limits. Postpartum checks within normal limits - see flow record. Patient eating and drinking normally. Patient able to empty bladder independently and is up ambulating. No apparent signs of infection. Incision healing well, dressing removed, well approximated, no drainage, left open to air. Patient performing self cares and is able to care for infant.  Action: Patient medicated during the shift for pain. See MAR. Patient reassessed within 1 hour after each medication and pain was improved - patient stated she was comfortable. Patient education done about. See flow record.  Response: Positive attachment behaviors observed with infant. Support persons present.   Plan: discharge today 7/1. Seen by DR Angela . Fit for discharge home today, routine follow up in 6 weeks in clinic.Breast pump given for home. BC completed. EPDS score 1. ROP needs to be completed once significant other arrives. SWS have visited with patient see note.Discharge instructions reviewed with patient, will take OTC analgesia and stool softeners and continue on oral iron. All questions answered Ready to leave unit at 11.35am

## 2021-07-07 NOTE — OP NOTE
[facility]   Full Operative Note    Patient Name:Chelo Garcia  MRN: [id number]  YOB: 1995  Surgery Date: 2021    Surgeon: John Kim MD    Pre-operative Diagnosis: 1) Intrauterine pregnancy at 35 weeks and 1 day 2) Fetal intolerance of labor   Post-operative Diagnosis: 1) Viable male infant, delivered   Procedure: Stat primary low transverse  section     Anesthesia: Epidural  FRA Score: 2    EBL: 882 mL   IV fluids: Refer to anesthesia records  Urine Output: Refer to anesthesia records (yellow colored urine at the end of the procedure)     Complications: None  Specimen: Placenta  Drains: Servin catheter    Findings:  Viable male infant delivered in cephalic presentation at 0142 on 2021. No nuchal cord. Clear amniotic fluid. Weight 5 lbs 5 oz. Apgar 6 and 8 at 1 and 5 minutes, respectively. Arterial pH 7.23, base excess 4.6. Normal appearing uterus, bilateral fallopian tubes and ovaries. No intraperitoneal adhesions noted.    Indication: Ms.Jasmine CAMILLE Garcia 26 year old  presented at 35w0d with  premature rupture of membranes. Her cervix on admission was 2-3/100/-2 at 1350, . Given unfavorable cervix status (Forde's score of 7), she was given one dose of oral cytotec 25 mcg. She progressed to a favorable cervical dilation at 3/100/-2 at 1645.   Given her ruptured membrane status, Pitocin induction was ensued. Pitocin max to 3 mU/min    She progressed to complete/+1 dilation at 0030,   Second stage of labor, however, was complicated by Category 3 fetal heart tracing with recurrent variable decelerations court to 70 bpm as well as prolonged decelerations to the 70 bpm.      Patient was counseled that given remoteness of delivery, that a  section would be recommended. Risks of  were reviewed including but not limited to  bleeding, infection, need for blood transfusion, injury to surrounding organs (ie  bowel/intestines, bladder, ureters, major blood vessels and nerves)., which if injured, then will be identified and repaired at time of surgery or will be repaired by surgeons that specialize in those areas. Also reviewed unintended injuries going unnoticed at the time of surgery, and that would require additional surgery to be done to have them repaired. Also discussed was fetal injury and possible  hysterectomy for life threatening bleeding. Patient and  conveyed understanding of risks and agreed to proceed. Patient provided verbal consent.      Stat  was called. Proceeded to operating room in an expedient manner.     Technique: The patient was taken to the operating room where she was transferred to the OR table from her hospital bed and placed in supine position. Patient was prepped and draped in the usual sterile fashion. Spinal anesthesia was administered and found to be adequate. The OR table was tilted to the left. A formal TIME-OUT was conducted with correct identification of the patient and procedure being performed.     A Pfannenstiel skin incision was made with the scalpel and carried down to the underlying fascia with the scalpel. The fascia was incised at the midline and extended laterally using Babb scissors.  The superior and inferior aspects of the fascia were grasped with Kocher clamps, lifted and dissected off the underlying rectus muscles in a sharp fashion.  The rectus muscles were  in the midline in a digital blunt fashion. The peritoneum was entered in a digital blunt fashion and extended superiorly and inferiorly with careful attention to avoid injury to the bowel and bladder. Once adequate extension of the peritoneum was achieved to allow for eventual delivery of the baby, the Marv 0 retractor was inserted into the peritoneum.       A lower uterine segment incision was made with the scalpel and extended laterally in a digital blunt fashion. The head was  grasped, elevated and delivered along with the rest of the infant through the hysterotomy without difficulty. The infant was handed off to the waiting nursery team. The cord was clamped, cut and handed off to the waiting nursery team. Cord gases were collected and sent.       The placenta was delivered spontaneously using gentle traction of the cord. The uterus was exteriorized and cleared of all clots and debris. Gentle massage was employed to achieve firmness to the uterus along with the instillation of uterotonics in the way of IV Pitocin in normal saline as well as IM methergine. The hysterotomy incision was repaired with 0 Vicryl in a running locked fashion. An imbricating layer along the repaired hysterotomy was made using 0 Monocryl in a running fashion. The repaired hysterotomy incision was inspected for hemostasis and deemed adequate.        The posterior cul-de-sac was irrigated and suctioned, and the uterus returned to the abdomen. The repaired hysterotomy inspected for adequate hemostasis. The Marv 0 retraction was removed from the peritoneum. Sepra film was applied over the repaired hysterotomy incision and the uterine fundus.       The peritoneum was approximated using 2-0 Vicryl in a running fashion. The rectus muscles were inspected for adequate hemostasis and not re-approximated. The fascia layer was reapproximated using 0 Vicryl in a running fashion. The subcutaneous layer was irrigated, inspected for adequate hemostasis and re-approximated using 3-0 plain gut in interrupted  fashion in one layer. The skin was closed with subcuticular fashion using 4-0 Vicryl.      The patient tolerated the procedure well and was taken to the postpartum area in stable condition. IV ancef and IV azithromycin were given during the procedure. Sponge, laps and needle counts were correct x 2.     John Kim MD  [facility]

## 2021-07-12 ENCOUNTER — OFFICE VISIT (OUTPATIENT)
Dept: OBGYN | Facility: CLINIC | Age: 26
End: 2021-07-12
Payer: COMMERCIAL

## 2021-07-12 VITALS — SYSTOLIC BLOOD PRESSURE: 136 MMHG | DIASTOLIC BLOOD PRESSURE: 80 MMHG | BODY MASS INDEX: 35.36 KG/M2 | WEIGHT: 206 LBS

## 2021-07-12 DIAGNOSIS — Z48.89 ENCOUNTER FOR POSTOPERATIVE WOUND CHECK: Primary | ICD-10-CM

## 2021-07-12 PROCEDURE — 99213 OFFICE O/P EST LOW 20 MIN: CPT | Performed by: OBSTETRICS & GYNECOLOGY

## 2021-07-12 NOTE — PROGRESS NOTES
S:  Chelo Garcia is a 26 year old female  who is status post emergency primary low segment transverse  section on 2021 for intrauterine pregnancy at 35-1/7 weeks gestation and fetal intolerance of labor.  The patient presents today with request for postop wound check.  She denies fevers chills nausea or vomiting.  She states that her pain is in good control with only Tylenol and ibuprofen.  She has normal bowel and bladder function  Past Medical History:   Diagnosis Date     Chronic midline low back pain without sciatica 2017     Depressive disorder 2017     Overweight 2017     Proteinuria     small protein on screening UA - recheck     Current Outpatient Medications   Medication     buPROPion (WELLBUTRIN SR) 100 MG 12 hr tablet     ferrous sulfate (FEROSUL) 325 (65 Fe) MG tablet     Prenatal Vit-Fe Fumarate-FA (PRENATAL MULTIVITAMIN W/IRON) 27-0.8 MG tablet     sertraline (ZOLOFT) 100 MG tablet     No current facility-administered medications for this visit.         O:  /80   Wt 93.4 kg (206 lb)   LMP 10/26/2020   BMI 35.36 kg/m    Constitutional: healthy, alert and no distress  Gastrointestinal: Abdomen soft, non-tender. BS normal. No masses, organomegaly there were 3 ensorb stapler clips that had eroded through the skin edge.  I removed these without difficulty or complication.  The remainder the incision is clean and intact and healing nicely.  Some of the XO fin adhesive is beginning to peel off.  There is no evidence of any infection or cellulitis    (Z48.89) Encounter for postoperative wound check  (primary encounter diagnosis)  Comment: Patient is doing well several of the ensorb clips had eroded externally and I remove these  Plan: I reviewed postop activity restrictions and wound cares and answered her questions.  We will see her back for a 6-week postop postpartum visit.  She will call if she has any concerns in the interval

## 2021-07-12 NOTE — PATIENT INSTRUCTIONS
You can reach your [facility] Care Team any time of the day by calling [phone]. This number will put you in touch with the 24 hour nurse line if the clinic is closed.    To contact your OB/GYN Station Coordinator/Surgery Scheduler please call [phone]. This is a direct number for your care team between 8 a.m. and 4 p.m. Monday through Friday.    [facility] is open for your convenience:  Monday through Friday 8 a.m. to 6 p.m.  Closed weekends and all major holidays.

## 2021-07-12 NOTE — NURSING NOTE
"Chief Complaint   Patient presents with     Surgical Followup       Initial /80   Wt 93.4 kg (206 lb)   LMP 10/26/2020   BMI 35.36 kg/m   Estimated body mass index is 35.36 kg/m  as calculated from the following:    Height as of 21: 1.626 m (5' 4\").    Weight as of this encounter: 93.4 kg (206 lb).  BP completed using cuff size: regular    Questioned patient about current smoking habits.  Pt. has never smoked.          The following HM Due: NONE      The following patient reported/Care Every where data was sent to:  P ABSTRACT QUALITY INITIATIVES [[id number]]        Judi [facility]ks[facility]                 "

## 2021-08-13 ENCOUNTER — PRENATAL OFFICE VISIT (OUTPATIENT)
Dept: OBGYN | Facility: CLINIC | Age: 26
End: 2021-08-13
Payer: COMMERCIAL

## 2021-08-13 VITALS
DIASTOLIC BLOOD PRESSURE: 60 MMHG | HEIGHT: 64 IN | SYSTOLIC BLOOD PRESSURE: 102 MMHG | BODY MASS INDEX: 34.49 KG/M2 | WEIGHT: 202 LBS

## 2021-08-13 LAB — HCG IFA URINE: NEGATIVE

## 2021-08-13 PROCEDURE — 84703 CHORIONIC GONADOTROPIN ASSAY: CPT | Performed by: OBSTETRICS & GYNECOLOGY

## 2021-08-13 PROCEDURE — 99207 PR POST PARTUM EXAM: CPT | Performed by: OBSTETRICS & GYNECOLOGY

## 2021-08-13 ASSESSMENT — MIFFLIN-ST. JEOR: SCORE: 1641.27

## 2021-08-13 ASSESSMENT — PATIENT HEALTH QUESTIONNAIRE - PHQ9: SUM OF ALL RESPONSES TO PHQ QUESTIONS 1-9: 1

## 2021-08-13 NOTE — NURSING NOTE
"Chief Complaint   Patient presents with     Postpartum Care     del , [place], C/S, 5 lbs 5 oz       Initial /60 (BP Location: Left arm, Patient Position: Chair, Cuff Size: Adult Regular)   Ht 1.626 m (5' 4\")   Wt 91.6 kg (202 lb)   LMP 10/26/2020   Breastfeeding Yes   BMI 34.67 kg/m   Estimated body mass index is 34.67 kg/m  as calculated from the following:    Height as of this encounter: 1.626 m (5' 4\").    Weight as of this encounter: 91.6 kg (202 lb).  BP completed using cuff size: regular    Questioned patient about current smoking habits.  Pt. has never smoked.          The following HM Due: NONE    Mary Aguayo CMA      "

## 2021-08-13 NOTE — PROGRESS NOTES
"  SUBJECTIVE:                                                   CC:  Postpartum visit    HPI:  Chelo Garcia is a 26 year old  s/p  6 weeks ago who presents for postpartum follow up.      Baby spent some time in the NICU due to  delivery (35 wks) but now is home and is going well.    She is pumping and feeding breast milk - he got used to the bottle while in the NICU  FOB got a vasectomy already, but she would like a confirmatory UPT today  Mood is good today    Wt Readings from Last 3 Encounters:   21 91.6 kg (202 lb)   21 93.4 kg (206 lb)   21 103.9 kg (229 lb)     PHQ-9 SCORE 2019   PHQ-9 Total Score MyChart 7 (Mild depression) - -   PHQ-9 Total Score 7 6 1     CHASE-7 SCORE 2019   Total Score - 2 (minimal anxiety) -   Total Score 0 2 3      Last 3 Pap and HPV Results:   PAP / HPV Latest Ref Rng & Units 2019   PAP (Historical) - NIL   HPV16 NEG:Negative Negative   HPV18 NEG:Negative Negative   HRHPV NEG:Negative Negative       PMH, PSH, Soc Hx, Fam Hx, Meds, and allergies reviewed in Epic.    OBJECTIVE:     /60 (BP Location: Left arm, Patient Position: Chair, Cuff Size: Adult Regular)   Ht 1.626 m (5' 4\")   Wt 91.6 kg (202 lb)   LMP 10/26/2020   Breastfeeding Yes   BMI 34.67 kg/m      Gen: Healthy appearing female, no acute distress, comfortable.  Well groomed, good eye contact.    HENT: No scleral injection or icterus  CV: Regular rate  Resp: Normal work of breathing, no cough  Psychiatric: mentation appears normal and affect bright    ASSESSMENT/PLAN:                                                      1. Routine postpartum follow-up  UPT neg today  Doing well, see HPI.  Discussed risk of postpartum anxiety/depression for up to a year.  S/p vasectomy for contraception.    RTC in 2022 for pap smear     Courtney Inman MD, MPH  Obstetrics and Gynecology    "

## 2021-08-16 NOTE — RESULT ENCOUNTER NOTE
Results discussed directly with patient while patient was present during in person visit. Any further details documented in the note.   Courtney Inman MD

## 2021-09-05 ENCOUNTER — HEALTH MAINTENANCE LETTER (OUTPATIENT)
Age: 26
End: 2021-09-05

## 2021-10-05 ENCOUNTER — VIRTUAL VISIT (OUTPATIENT)
Dept: INTERNAL MEDICINE | Facility: CLINIC | Age: 26
End: 2021-10-05
Payer: COMMERCIAL

## 2021-10-05 DIAGNOSIS — F41.1 GENERALIZED ANXIETY DISORDER: ICD-10-CM

## 2021-10-05 DIAGNOSIS — F33.2 SEVERE EPISODE OF RECURRENT MAJOR DEPRESSIVE DISORDER, WITHOUT PSYCHOTIC FEATURES (H): ICD-10-CM

## 2021-10-05 PROCEDURE — 99203 OFFICE O/P NEW LOW 30 MIN: CPT | Mod: GT | Performed by: INTERNAL MEDICINE

## 2021-10-05 RX ORDER — BUPROPION HYDROCHLORIDE 100 MG/1
150 TABLET, EXTENDED RELEASE ORAL DAILY
Qty: 45 TABLET | Refills: 6 | Status: SHIPPED | OUTPATIENT
Start: 2021-10-05 | End: 2022-10-12

## 2021-10-05 RX ORDER — SERTRALINE HYDROCHLORIDE 100 MG/1
150 TABLET, FILM COATED ORAL DAILY
Qty: 45 TABLET | Refills: 6 | Status: SHIPPED | OUTPATIENT
Start: 2021-10-05 | End: 2022-10-11

## 2021-10-05 ASSESSMENT — ANXIETY QUESTIONNAIRES
3. WORRYING TOO MUCH ABOUT DIFFERENT THINGS: SEVERAL DAYS
IF YOU CHECKED OFF ANY PROBLEMS ON THIS QUESTIONNAIRE, HOW DIFFICULT HAVE THESE PROBLEMS MADE IT FOR YOU TO DO YOUR WORK, TAKE CARE OF THINGS AT HOME, OR GET ALONG WITH OTHER PEOPLE: NOT DIFFICULT AT ALL
6. BECOMING EASILY ANNOYED OR IRRITABLE: NOT AT ALL
5. BEING SO RESTLESS THAT IT IS HARD TO SIT STILL: NOT AT ALL
1. FEELING NERVOUS, ANXIOUS, OR ON EDGE: NOT AT ALL
7. FEELING AFRAID AS IF SOMETHING AWFUL MIGHT HAPPEN: NOT AT ALL
2. NOT BEING ABLE TO STOP OR CONTROL WORRYING: SEVERAL DAYS
GAD7 TOTAL SCORE: 3

## 2021-10-05 ASSESSMENT — PATIENT HEALTH QUESTIONNAIRE - PHQ9
SUM OF ALL RESPONSES TO PHQ QUESTIONS 1-9: 2
5. POOR APPETITE OR OVEREATING: SEVERAL DAYS

## 2021-10-05 NOTE — PROGRESS NOTES
"Chelo is a 26 year old who is being evaluated via a billable video visit.      How would you like to obtain your AVS? Patient declined  If the video visit is dropped, the invitation should be resent by: Text to cell phone: [phone]  Will anyone else be joining your video visit? No        This is a VIDEO ( using Doximity)  encounter with the patient.       Location of the provider : office   Location of the patient : home      12:19 --- 12:28          Dr Barrios's note      Patient's instructions / PLAN:                                                        Plan:  1. Continue same meds, same doses for now   2. Follow up 6 month - video appointment is ok        ASSESSMENT & PLAN:                                                      (F33.2) Severe episode of recurrent major depressive disorder, without psychotic features (H)  Comment: Controlled    Plan: buPROPion (WELLBUTRIN SR) 100 MG 12 hr tablet,         sertraline (ZOLOFT) 100 MG tablet            (F41.1) Generalized anxiety disorder  Comment: Controlled    Plan: buPROPion (WELLBUTRIN SR) 100 MG 12 hr tablet,         sertraline (ZOLOFT) 100 MG tablet               Chief complaint:                                                      Anxiety/Depression     SUBJECTIVE:                                                    History of present illness:    Anxiety/Depression   -- x \" my whole life\"  -- takes sertraline  150 mg and bupropion 150 mg  -- she feels that anxiety and depression are under control with the present meds.          Depression and Anxiety Follow-Up    How are you doing with your depression since your last visit? Improved since on medication but if she misses any dose then the depression came back.    How are you doing with your anxiety since your last visit?  Improved since on medication but if she misses any dose then the depression came back.    Are you having other symptoms that might be associated with depression or anxiety? No    Have " you had a significant life event? OTHER: She just had a baby.     Do you have any concerns with your use of alcohol or other drugs? No    Social History     Tobacco Use     Smoking status: Never Smoker     Smokeless tobacco: Never Used   Vaping Use     Vaping Use: Never used   Substance Use Topics     Alcohol use: Not Currently     Comment: Socially, once a week     Drug use: Yes     Types: Marijuana     Comment: Occasional use     PHQ 5/19/2020 8/13/2021 10/5/2021   PHQ-9 Total Score 6 1 2   Q9: Thoughts of better off dead/self-harm past 2 weeks Not at all Not at all Not at all   F/U: Thoughts of suicide or self-harm - - -   F/U: Self harm-plan - - -   F/U: Self-harm action - - -   F/U: Safety concerns - - -     CHASE-7 SCORE 12/26/2019 5/19/2020 10/5/2021   Total Score 2 (minimal anxiety) - -   Total Score 2 3 3     Last PHQ-9 10/5/2021   1.  Little interest or pleasure in doing things 0   2.  Feeling down, depressed, or hopeless 0   3.  Trouble falling or staying asleep, or sleeping too much 1   4.  Feeling tired or having little energy 1   5.  Poor appetite or overeating 0   6.  Feeling bad about yourself 0   7.  Trouble concentrating 0   8.  Moving slowly or restless 0   Q9: Thoughts of better off dead/self-harm past 2 weeks 0   PHQ-9 Total Score 2   Difficulty at work, home, or with people Not difficult at all   In the past two weeks have you had thoughts of suicide or self harm? -   Do you have concerns about your personal safety or the safety of others? -   In the past 2 weeks have you thought about a plan or had intention to harm yourself? -   In the past 2 weeks have you acted on these thoughts in any way? -     CHASE-7  10/5/2021   1. Feeling nervous, anxious, or on edge 0   2. Not being able to stop or control worrying 1   3. Worrying too much about different things 1   4. Trouble relaxing 1   5. Being so restless that it is hard to sit still 0   6. Becoming easily annoyed or irritable 0   7. Feeling afraid,  as if something awful might happen 0   CHASE-7 Total Score 3   If you checked any problems, how difficult have they made it for you to do your work, take care of things at home, or get along with other people? Not difficult at all       Suicide Assessment Five-step Evaluation and Treatment (SAFE-T)      How many servings of fruits and vegetables do you eat daily?  0-1    On average, how many sweetened beverages do you drink each day (Examples: soda, juice, sweet tea, etc.  Do NOT count diet or artificially sweetened beverages)?   0    How many days per week do you exercise enough to make your heart beat faster? 3 or less    How many minutes a day do you exercise enough to make your heart beat faster? 30 - 60    How many days per week do you miss taking your medication? 0      Review of Systems:                                                      ROS: negative for fever, chills, cough, wheezes, chest pain, shortness of breath, vomiting, abdominal pain, leg swelling      OBJECTIVE:           An actual physical exam can't be done during phone visit   A limited exam can sometimes be performed by video visit   NAD      PMHx: reviewed  Past Medical History:   Diagnosis Date     Chronic midline low back pain without sciatica 2017     Depressive disorder 2017     Overweight 2017     Proteinuria     small protein on screening UA - recheck      PSHx: reviewed  Past Surgical History:   Procedure Laterality Date     AS INDUCED ABORTN BY D&C  2019      SECTION N/A 2021    Procedure:  SECTION;  Surgeon: John Kim MD;  Location:  OR        Meds: reviewed  Current Outpatient Medications   Medication Sig Dispense Refill     buPROPion (WELLBUTRIN SR) 100 MG 12 hr tablet Take 1.5 tablets (150 mg) by mouth daily 45 tablet 6     ferrous sulfate (FEROSUL) 325 (65 Fe) MG tablet Take 325 mg by mouth daily (with breakfast)       sertraline (ZOLOFT) 100 MG tablet Take 1.5 tablets (150  mg) by mouth daily 45 tablet 6       Soc Hx: reviewed  Fam Hx: reviewed          Elidia Barrios MD  Internal Medicine

## 2021-10-06 ASSESSMENT — ANXIETY QUESTIONNAIRES: GAD7 TOTAL SCORE: 3

## 2022-03-09 ENCOUNTER — E-VISIT (OUTPATIENT)
Dept: INTERNAL MEDICINE | Facility: CLINIC | Age: 27
End: 2022-03-09
Payer: COMMERCIAL

## 2022-03-09 DIAGNOSIS — F33.2 SEVERE EPISODE OF RECURRENT MAJOR DEPRESSIVE DISORDER, WITHOUT PSYCHOTIC FEATURES (H): ICD-10-CM

## 2022-03-09 DIAGNOSIS — F41.1 GENERALIZED ANXIETY DISORDER: Primary | ICD-10-CM

## 2022-03-09 ASSESSMENT — ANXIETY QUESTIONNAIRES
5. BEING SO RESTLESS THAT IT IS HARD TO SIT STILL: NOT AT ALL
GAD7 TOTAL SCORE: 9
6. BECOMING EASILY ANNOYED OR IRRITABLE: SEVERAL DAYS
7. FEELING AFRAID AS IF SOMETHING AWFUL MIGHT HAPPEN: NOT AT ALL
8. IF YOU CHECKED OFF ANY PROBLEMS, HOW DIFFICULT HAVE THESE MADE IT FOR YOU TO DO YOUR WORK, TAKE CARE OF THINGS AT HOME, OR GET ALONG WITH OTHER PEOPLE?: VERY DIFFICULT
1. FEELING NERVOUS, ANXIOUS, OR ON EDGE: MORE THAN HALF THE DAYS
7. FEELING AFRAID AS IF SOMETHING AWFUL MIGHT HAPPEN: NOT AT ALL
GAD7 TOTAL SCORE: 9
3. WORRYING TOO MUCH ABOUT DIFFERENT THINGS: NEARLY EVERY DAY
GAD7 TOTAL SCORE: 9
4. TROUBLE RELAXING: SEVERAL DAYS
2. NOT BEING ABLE TO STOP OR CONTROL WORRYING: MORE THAN HALF THE DAYS

## 2022-03-09 ASSESSMENT — PATIENT HEALTH QUESTIONNAIRE - PHQ9
SUM OF ALL RESPONSES TO PHQ QUESTIONS 1-9: 9
SUM OF ALL RESPONSES TO PHQ QUESTIONS 1-9: 9
10. IF YOU CHECKED OFF ANY PROBLEMS, HOW DIFFICULT HAVE THESE PROBLEMS MADE IT FOR YOU TO DO YOUR WORK, TAKE CARE OF THINGS AT HOME, OR GET ALONG WITH OTHER PEOPLE: SOMEWHAT DIFFICULT

## 2022-03-09 NOTE — TELEPHONE ENCOUNTER
Provider E-Visit time total (minutes): 8    I talked briefly with our counselor Sloane who helped me with the referral in the computer

## 2022-03-10 ASSESSMENT — PATIENT HEALTH QUESTIONNAIRE - PHQ9: SUM OF ALL RESPONSES TO PHQ QUESTIONS 1-9: 9

## 2022-03-10 ASSESSMENT — ANXIETY QUESTIONNAIRES: GAD7 TOTAL SCORE: 9

## 2022-04-13 ENCOUNTER — E-VISIT (OUTPATIENT)
Dept: INTERNAL MEDICINE | Facility: CLINIC | Age: 27
End: 2022-04-13
Payer: COMMERCIAL

## 2022-04-13 DIAGNOSIS — J02.9 SORE THROAT: Primary | ICD-10-CM

## 2022-04-13 PROCEDURE — 99207 PR NON-BILLABLE SERV PER CHARTING: CPT | Performed by: INTERNAL MEDICINE

## 2022-06-28 ENCOUNTER — E-VISIT (OUTPATIENT)
Dept: INTERNAL MEDICINE | Facility: CLINIC | Age: 27
End: 2022-06-28
Payer: COMMERCIAL

## 2022-06-28 DIAGNOSIS — F98.8 ATTENTION DEFICIT DISORDER, UNSPECIFIED HYPERACTIVITY PRESENCE: Primary | ICD-10-CM

## 2022-06-28 PROCEDURE — 99207 PR NON-BILLABLE SERV PER CHARTING: CPT | Performed by: INTERNAL MEDICINE

## 2022-08-17 DIAGNOSIS — F41.1 GENERALIZED ANXIETY DISORDER: ICD-10-CM

## 2022-08-17 DIAGNOSIS — F33.2 SEVERE EPISODE OF RECURRENT MAJOR DEPRESSIVE DISORDER, WITHOUT PSYCHOTIC FEATURES (H): ICD-10-CM

## 2022-08-21 RX ORDER — BUPROPION HYDROCHLORIDE 100 MG/1
TABLET, EXTENDED RELEASE ORAL
Qty: 45 TABLET | Refills: 6 | OUTPATIENT
Start: 2022-08-21

## 2022-09-22 DIAGNOSIS — F33.2 SEVERE EPISODE OF RECURRENT MAJOR DEPRESSIVE DISORDER, WITHOUT PSYCHOTIC FEATURES (H): Primary | ICD-10-CM

## 2022-09-22 DIAGNOSIS — F98.8 ATTENTION DEFICIT DISORDER, UNSPECIFIED HYPERACTIVITY PRESENCE: ICD-10-CM

## 2022-09-22 DIAGNOSIS — F41.1 GENERALIZED ANXIETY DISORDER: ICD-10-CM

## 2022-09-28 ENCOUNTER — MYC MEDICAL ADVICE (OUTPATIENT)
Dept: INTERNAL MEDICINE | Facility: CLINIC | Age: 27
End: 2022-09-28

## 2022-09-28 NOTE — TELEPHONE ENCOUNTER
S-(situation): left upper back pain    B-(background): No history of similar symptoms, denies doing any heavy lifting, reaching or pulling.  Does have cold symptoms, mostly head congestion with occasional cough    A-(assessment): Woke up 5:30 a.m. today with left upper back pain.  Pain is constant but changes in intensity.  It does not worsen with deep inspiration or use of arms.  Pain does increase with twisting movement of torso. Rates pain 5 out of 10 at rest.  Increase in pain to 8 or 9 out of 10 when twisting upper body. She denies breathing difficulty, chest pain, fever.  Pain does not radiate into arms, chest, neck or jaw.     R-(recommendations): Advised patient she should be seen in clinic or UC.  Transferred her to schedulers to look for appointment today.  If unable to schedule an appointment then she is to be seen in UC.  SHERRELL Ahuja R.N.

## 2022-10-05 ENCOUNTER — MYC MEDICAL ADVICE (OUTPATIENT)
Dept: INTERNAL MEDICINE | Facility: CLINIC | Age: 27
End: 2022-10-05

## 2022-10-05 ENCOUNTER — E-VISIT (OUTPATIENT)
Dept: INTERNAL MEDICINE | Facility: CLINIC | Age: 27
End: 2022-10-05
Payer: COMMERCIAL

## 2022-10-05 DIAGNOSIS — F41.1 GENERALIZED ANXIETY DISORDER: ICD-10-CM

## 2022-10-05 DIAGNOSIS — N39.0 ACUTE UTI (URINARY TRACT INFECTION): Primary | ICD-10-CM

## 2022-10-05 DIAGNOSIS — F33.2 SEVERE EPISODE OF RECURRENT MAJOR DEPRESSIVE DISORDER, WITHOUT PSYCHOTIC FEATURES (H): ICD-10-CM

## 2022-10-05 PROCEDURE — 99421 OL DIG E/M SVC 5-10 MIN: CPT | Performed by: NURSE PRACTITIONER

## 2022-10-05 RX ORDER — NITROFURANTOIN 25; 75 MG/1; MG/1
100 CAPSULE ORAL 2 TIMES DAILY
Qty: 10 CAPSULE | Refills: 0 | Status: SHIPPED | OUTPATIENT
Start: 2022-10-05 | End: 2022-10-10

## 2022-10-05 NOTE — PATIENT INSTRUCTIONS
Dear Chelo Garcia    After reviewing your responses, I've been able to diagnose you with a urinary tract infection, which is a common infection of the bladder with bacteria.  This is not a sexually transmitted infection, though urinating immediately after intercourse can help prevent infections.  Drinking lots of fluids is also helpful to clear your current infection and prevent the next one.      I have sent a prescription for antibiotics to your pharmacy to treat this infection.    It is important that you take all of your prescribed medication even if your symptoms are improving after a few doses.  Taking all of your medicine helps prevent the symptoms from returning.     If your symptoms worsen, you develop pain in your back or stomach, develop fevers, or are not improving in 5 days, please contact your primary care provider for an appointment or visit any of our convenient Walk-in or Urgent Care Centers to be seen, which can be found on our website here.    Thanks again for choosing us as your health care partner,    LOUIE Monteiro CNP    Urinary Tract Infections in Women  Urinary tract infections (UTIs) are most often caused by bacteria. These bacteria enter the urinary tract. The bacteria may come from inside the body. Or they may travel from the skin outside the rectum or vagina into the urethra. Female anatomy makes it easy for bacteria from the bowel to enter a woman s urinary tract, which is the most common source of UTI. This means women develop UTIs more often than men. Pain in or around the urinary tract is a common UTI symptom. But the only way to know for sure if you have a UTI for the healthcare provider to test your urine. The two tests that may be done are the urinalysis and urine culture.     Types of UTIs    Cystitis. A bladder infection (cystitis) is the most common UTI in women. You may have urgent or frequent need to pee. You may also have pain, burning when you pee, and  bloody urine.    Urethritis. This is an inflamed urethra, which is the tube that carries urine from the bladder to outside the body. You may have lower stomach or back pain. You may also have urgent or frequent need to pee.    Pyelonephritis. This is a kidney infection. If not treated, it can be serious and damage your kidneys. In severe cases, you may need to stay in the hospital. You may have a fever and lower back pain.    Medicines to treat a UTI  Most UTIs are treated with antibiotics. These kill the bacteria. The length of time you need to take them depends on the type of infection. It may be as short as 3 days. If you have repeated UTIs, you may need a low-dose antibiotic for several months. Take antibiotics exactly as directed. Don t stop taking them until all of the medicine is gone. If you stop taking the antibiotic too soon, the infection may not go away. You may also develop a resistance to the antibiotic. This can make it much harder to treat.   Lifestyle changes to treat and prevent UTIs   The lifestyle changes below will help get rid of your UTI. They may also help prevent future UTIs.     Drink plenty of fluids. This includes water, juice, or other caffeine-free drinks. Fluids help flush bacteria out of your body.    Empty your bladder. Always empty your bladder when you feel the urge to pee. And always pee before going to sleep. Urine that stays in your bladder can lead to infection. Try to pee before and after sex as well.    Practice good personal hygiene. Wipe yourself from front to back after using the toilet. This helps keep bacteria from getting into the urethra.    Use condoms during sex. These help prevent UTIs caused by sexually transmitted bacteria. Also don't use spermicides during sex. These can increase the risk for UTIs. Choose other forms of birth control instead. For women who tend to get UTIs after sex, a low-dose of a preventive antibiotic may be used. Be sure to discuss this  option with your healthcare provider.    Follow up with your healthcare provider as directed. He or she may test to make sure the infection has cleared. If needed, more treatment may be started.  [employer]sm[employer] last reviewed this educational content on 7/1/2019 2000-2021 [employer]. All rights reserved. This information is not intended as a substitute for professional medical care. Always follow your healthcare professional's instructions.

## 2022-10-06 DIAGNOSIS — F41.1 GENERALIZED ANXIETY DISORDER: ICD-10-CM

## 2022-10-06 DIAGNOSIS — F33.2 SEVERE EPISODE OF RECURRENT MAJOR DEPRESSIVE DISORDER, WITHOUT PSYCHOTIC FEATURES (H): ICD-10-CM

## 2022-10-06 NOTE — TELEPHONE ENCOUNTER
IQcard message sent to patient to confirm [facility] location for refill.     Aishwarya Allred RN  [facility]

## 2022-10-07 NOTE — TELEPHONE ENCOUNTER
PHQ-9 score:    PHQ 3/9/2022   PHQ-9 Total Score 9   Q9: Thoughts of better off dead/self-harm past 2 weeks Not at all   F/U: Thoughts of suicide or self-harm -   F/U: Self harm-plan -   F/U: Self-harm action -   F/U: Safety concerns -     Routing refill request to provider for review/approval because:  Last PHQ9 greater than 6 months ago and score was greater than 5

## 2022-10-10 NOTE — TELEPHONE ENCOUNTER
Routing refill request to provider for review/approval because:  Phq 9 out of date.       Routed to provider and team.  Please call and see if you can do a PHQ 9 over the phone.

## 2022-10-11 RX ORDER — SERTRALINE HYDROCHLORIDE 100 MG/1
150 TABLET, FILM COATED ORAL DAILY
Qty: 45 TABLET | Refills: 6 | Status: SHIPPED | OUTPATIENT
Start: 2022-10-11 | End: 2023-04-27

## 2022-10-12 RX ORDER — BUPROPION HYDROCHLORIDE 100 MG/1
150 TABLET, EXTENDED RELEASE ORAL DAILY
Qty: 45 TABLET | Refills: 0 | Status: SHIPPED | OUTPATIENT
Start: 2022-10-12 | End: 2022-11-21

## 2022-10-12 ASSESSMENT — PATIENT HEALTH QUESTIONNAIRE - PHQ9: SUM OF ALL RESPONSES TO PHQ QUESTIONS 1-9: 8

## 2022-10-12 NOTE — TELEPHONE ENCOUNTER
Will send current dose over- she might consider appointment with Dr Barrios to discuss increase in dose

## 2022-10-23 ENCOUNTER — HEALTH MAINTENANCE LETTER (OUTPATIENT)
Age: 27
End: 2022-10-23

## 2022-11-21 ENCOUNTER — MYC REFILL (OUTPATIENT)
Dept: INTERNAL MEDICINE | Facility: CLINIC | Age: 27
End: 2022-11-21

## 2022-11-21 DIAGNOSIS — F33.2 SEVERE EPISODE OF RECURRENT MAJOR DEPRESSIVE DISORDER, WITHOUT PSYCHOTIC FEATURES (H): ICD-10-CM

## 2022-11-21 DIAGNOSIS — F41.1 GENERALIZED ANXIETY DISORDER: ICD-10-CM

## 2022-11-22 NOTE — TELEPHONE ENCOUNTER
Pending Prescriptions:                       Disp   Refills    buPROPion (WELLBUTRIN SR) 100 MG 12 hr tab*45 tab*0        Sig: Take 1.5 tablets (150 mg) by mouth daily    Routing refill request to provider for review/approval because:  PHQ-9 score:    PHQ 10/12/2022   PHQ-9 Total Score 8   Q9: Thoughts of better off dead/self-harm past 2 weeks Not at all   F/U: Thoughts of suicide or self-harm -   F/U: Self harm-plan -   F/U: Self-harm action -   F/U: Safety concerns -

## 2022-11-23 RX ORDER — BUPROPION HYDROCHLORIDE 100 MG/1
150 TABLET, EXTENDED RELEASE ORAL DAILY
Qty: 45 TABLET | Refills: 0 | Status: SHIPPED | OUTPATIENT
Start: 2022-11-23 | End: 2022-12-14

## 2022-12-14 ENCOUNTER — VIRTUAL VISIT (OUTPATIENT)
Dept: PSYCHIATRY | Facility: CLINIC | Age: 27
End: 2022-12-14
Attending: INTERNAL MEDICINE
Payer: COMMERCIAL

## 2022-12-14 ENCOUNTER — TELEPHONE (OUTPATIENT)
Dept: PSYCHIATRY | Facility: CLINIC | Age: 27
End: 2022-12-14

## 2022-12-14 ENCOUNTER — VIRTUAL VISIT (OUTPATIENT)
Dept: BEHAVIORAL HEALTH | Facility: CLINIC | Age: 27
End: 2022-12-14
Payer: COMMERCIAL

## 2022-12-14 DIAGNOSIS — F33.1 MODERATE EPISODE OF RECURRENT MAJOR DEPRESSIVE DISORDER (H): Primary | ICD-10-CM

## 2022-12-14 DIAGNOSIS — F90.9 ATTENTION DEFICIT HYPERACTIVITY DISORDER (ADHD), UNSPECIFIED ADHD TYPE: ICD-10-CM

## 2022-12-14 DIAGNOSIS — F33.42 RECURRENT MAJOR DEPRESSIVE DISORDER, IN FULL REMISSION (H): ICD-10-CM

## 2022-12-14 DIAGNOSIS — F50.819 BINGE EATING DISORDER: Primary | ICD-10-CM

## 2022-12-14 DIAGNOSIS — F51.04 PSYCHOPHYSIOLOGICAL INSOMNIA: ICD-10-CM

## 2022-12-14 DIAGNOSIS — F41.1 GENERALIZED ANXIETY DISORDER: ICD-10-CM

## 2022-12-14 PROCEDURE — 99204 OFFICE O/P NEW MOD 45 MIN: CPT | Mod: GT | Performed by: NURSE PRACTITIONER

## 2022-12-14 PROCEDURE — 90791 PSYCH DIAGNOSTIC EVALUATION: CPT | Mod: GT | Performed by: COUNSELOR

## 2022-12-14 RX ORDER — LISDEXAMFETAMINE DIMESYLATE 20 MG/1
20 CAPSULE ORAL EVERY MORNING
Qty: 30 CAPSULE | Refills: 0 | Status: SHIPPED | OUTPATIENT
Start: 2022-12-14 | End: 2022-12-28 | Stop reason: DRUGHIGH

## 2022-12-14 RX ORDER — BUPROPION HYDROCHLORIDE 100 MG/1
150 TABLET, EXTENDED RELEASE ORAL DAILY
Qty: 45 TABLET | Refills: 0 | Status: SHIPPED | OUTPATIENT
Start: 2022-12-14 | End: 2022-12-28

## 2022-12-14 ASSESSMENT — ANXIETY QUESTIONNAIRES
2. NOT BEING ABLE TO STOP OR CONTROL WORRYING: SEVERAL DAYS
GAD7 TOTAL SCORE: 6
3. WORRYING TOO MUCH ABOUT DIFFERENT THINGS: SEVERAL DAYS
GAD7 TOTAL SCORE: 6
IF YOU CHECKED OFF ANY PROBLEMS ON THIS QUESTIONNAIRE, HOW DIFFICULT HAVE THESE PROBLEMS MADE IT FOR YOU TO DO YOUR WORK, TAKE CARE OF THINGS AT HOME, OR GET ALONG WITH OTHER PEOPLE: VERY DIFFICULT
8. IF YOU CHECKED OFF ANY PROBLEMS, HOW DIFFICULT HAVE THESE MADE IT FOR YOU TO DO YOUR WORK, TAKE CARE OF THINGS AT HOME, OR GET ALONG WITH OTHER PEOPLE?: VERY DIFFICULT
6. BECOMING EASILY ANNOYED OR IRRITABLE: NOT AT ALL
5. BEING SO RESTLESS THAT IT IS HARD TO SIT STILL: SEVERAL DAYS
4. TROUBLE RELAXING: SEVERAL DAYS
1. FEELING NERVOUS, ANXIOUS, OR ON EDGE: MORE THAN HALF THE DAYS
7. FEELING AFRAID AS IF SOMETHING AWFUL MIGHT HAPPEN: NOT AT ALL
GAD7 TOTAL SCORE: 6
7. FEELING AFRAID AS IF SOMETHING AWFUL MIGHT HAPPEN: NOT AT ALL

## 2022-12-14 ASSESSMENT — PATIENT HEALTH QUESTIONNAIRE - PHQ9
SUM OF ALL RESPONSES TO PHQ QUESTIONS 1-9: 10
SUM OF ALL RESPONSES TO PHQ QUESTIONS 1-9: 10
10. IF YOU CHECKED OFF ANY PROBLEMS, HOW DIFFICULT HAVE THESE PROBLEMS MADE IT FOR YOU TO DO YOUR WORK, TAKE CARE OF THINGS AT HOME, OR GET ALONG WITH OTHER PEOPLE: VERY DIFFICULT
SUM OF ALL RESPONSES TO PHQ QUESTIONS 1-9: 10
SUM OF ALL RESPONSES TO PHQ QUESTIONS 1-9: 10
10. IF YOU CHECKED OFF ANY PROBLEMS, HOW DIFFICULT HAVE THESE PROBLEMS MADE IT FOR YOU TO DO YOUR WORK, TAKE CARE OF THINGS AT HOME, OR GET ALONG WITH OTHER PEOPLE: VERY DIFFICULT

## 2022-12-14 NOTE — Clinical Note
Dr. Barajas,   Thank you for referring Chelo to our Collaborative Care Psychiatry Service ([facility]PS).   Patient attended their intake today.  I've reviewed the Collaborative Care Psychiatry Service (CCPS) model of care with patient.  Chelo understands the goal of CCPS is to be seen for approximately 3-5 visits to optimize medications for psychiatric symptoms before returning to your care for ongoing management of mental health.  If long-term psychiatric services are deemed more appropriate, a referral can be placed at that time.   Please see today's intake for my diagnostic impression and plan and let me know if you have any questions or concerns.  I will update you and the patient once [facility]  Gratefully,  Ghislaine Ramesh, APRN, CNP, PNP-PC, PMHNP-BC  Collaborative Care Psychiatry Service ([facility]PS)

## 2022-12-14 NOTE — TELEPHONE ENCOUNTER
Prior Authorization Retail Medication Request    Medication/Dose:   lisdexamfetamine (VYVANSE) 20 MG capsule 30 capsule 0 12/14/2022  --   Sig - Route: Take 1 capsule (20 mg) by mouth every morning - Oral     Insurance Name:  [employer]    Pharmacy Information  Name:  [facility]  Phone:  [phone]    Routing to PA team.   Routing to Ghislaine LOPEZ.    Savi Goodrich RN on 12/14/2022 at 1:31 PM
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
## 2022-12-14 NOTE — Clinical Note
Please call patient to schedule a follow-up appointment with myself and [facility] CARLOS A Urbano, JERRY in 2 weeks.   Thank you,   Ghislaine Ramesh APRN, CNP, PNP-PC, PMHNP-BC  [facility] Service ([facility])

## 2022-12-15 NOTE — TELEPHONE ENCOUNTER
Central Prior Authorization Team - Phone: [phone]     Prior Authorization Approval    Authorization Effective Date: 11/15/2022  Authorization Expiration Date: 12/15/2023  Medication: lisdexamfetamine (VYVANSE) 20 MG capsule-PA APPROVED  Approved Dose/Quantity: 30  Reference #:     Insurance Company:    Expected CoPay:       CoPay Card Available:      Foundation Assistance Needed:    Which Pharmacy is filling the prescription (Not needed for infusion/clinic administered): [id number] - [place], [place] - [address] AT [facility] [address]  Pharmacy Notified: Yes on hold for 17 minutes with 3 or more calls still ahead. Left detailed message with approval info/dates and stated we would also notify patient since we unable to reach pharmacy.  Patient Notified: YesComment: call went to , left detailed message of who I am but no name of drug since , stated PA was approved and have also left messge for  pharmacy and pharmacy will notify when ready or to reach out to pharmacy prior to .

## 2022-12-17 ENCOUNTER — LAB (OUTPATIENT)
Dept: LAB | Facility: CLINIC | Age: 27
End: 2022-12-17
Payer: COMMERCIAL

## 2022-12-17 DIAGNOSIS — F33.42 RECURRENT MAJOR DEPRESSIVE DISORDER, IN FULL REMISSION (H): ICD-10-CM

## 2022-12-17 DIAGNOSIS — F50.819 BINGE EATING DISORDER: ICD-10-CM

## 2022-12-17 LAB
ALBUMIN SERPL BCG-MCNC: 4.4 G/DL (ref 3.5–5.2)
ALP SERPL-CCNC: 120 U/L (ref 35–104)
ALT SERPL W P-5'-P-CCNC: 10 U/L (ref 10–35)
ANION GAP SERPL CALCULATED.3IONS-SCNC: 11 MMOL/L (ref 7–15)
AST SERPL W P-5'-P-CCNC: 22 U/L (ref 10–35)
BASOPHILS # BLD AUTO: 0 10E3/UL (ref 0–0.2)
BASOPHILS NFR BLD AUTO: 0 %
BILIRUB SERPL-MCNC: <0.2 MG/DL
BUN SERPL-MCNC: 16.7 MG/DL (ref 6–20)
CALCIUM SERPL-MCNC: 9 MG/DL (ref 8.6–10)
CHLORIDE SERPL-SCNC: 105 MMOL/L (ref 98–107)
CHOLEST SERPL-MCNC: 235 MG/DL
CREAT SERPL-MCNC: 0.81 MG/DL (ref 0.51–0.95)
DEPRECATED HCO3 PLAS-SCNC: 24 MMOL/L (ref 22–29)
EOSINOPHIL # BLD AUTO: 0.1 10E3/UL (ref 0–0.7)
EOSINOPHIL NFR BLD AUTO: 1 %
ERYTHROCYTE [DISTWIDTH] IN BLOOD BY AUTOMATED COUNT: 15.1 % (ref 10–15)
GFR SERPL CREATININE-BSD FRML MDRD: >90 ML/MIN/1.73M2
GLUCOSE SERPL-MCNC: 91 MG/DL (ref 70–99)
HCT VFR BLD AUTO: 37.3 % (ref 35–47)
HDLC SERPL-MCNC: 40 MG/DL
HGB BLD-MCNC: 11.9 G/DL (ref 11.7–15.7)
LDLC SERPL CALC-MCNC: 153 MG/DL
LYMPHOCYTES # BLD AUTO: 2.7 10E3/UL (ref 0.8–5.3)
LYMPHOCYTES NFR BLD AUTO: 28 %
MCH RBC QN AUTO: 26.3 PG (ref 26.5–33)
MCHC RBC AUTO-ENTMCNC: 31.9 G/DL (ref 31.5–36.5)
MCV RBC AUTO: 83 FL (ref 78–100)
MONOCYTES # BLD AUTO: 0.5 10E3/UL (ref 0–1.3)
MONOCYTES NFR BLD AUTO: 5 %
NEUTROPHILS # BLD AUTO: 6.5 10E3/UL (ref 1.6–8.3)
NEUTROPHILS NFR BLD AUTO: 67 %
NONHDLC SERPL-MCNC: 195 MG/DL
PLATELET # BLD AUTO: 286 10E3/UL (ref 150–450)
POTASSIUM SERPL-SCNC: 3.7 MMOL/L (ref 3.4–5.3)
PROT SERPL-MCNC: 7.5 G/DL (ref 6.4–8.3)
RBC # BLD AUTO: 4.52 10E6/UL (ref 3.8–5.2)
SODIUM SERPL-SCNC: 140 MMOL/L (ref 136–145)
TRIGL SERPL-MCNC: 210 MG/DL
TSH SERPL DL<=0.005 MIU/L-ACNC: 1.54 UIU/ML (ref 0.3–4.2)
WBC # BLD AUTO: 9.7 10E3/UL (ref 4–11)

## 2022-12-17 PROCEDURE — 36415 COLL VENOUS BLD VENIPUNCTURE: CPT

## 2022-12-17 PROCEDURE — 82306 VITAMIN D 25 HYDROXY: CPT

## 2022-12-17 PROCEDURE — 80050 GENERAL HEALTH PANEL: CPT

## 2022-12-17 PROCEDURE — 80061 LIPID PANEL: CPT

## 2022-12-19 LAB — DEPRECATED CALCIDIOL+CALCIFEROL SERPL-MC: 11 UG/L (ref 20–75)

## 2022-12-28 ENCOUNTER — MYC MEDICAL ADVICE (OUTPATIENT)
Dept: PSYCHIATRY | Facility: CLINIC | Age: 27
End: 2022-12-28

## 2022-12-28 ENCOUNTER — VIRTUAL VISIT (OUTPATIENT)
Dept: PSYCHIATRY | Facility: CLINIC | Age: 27
End: 2022-12-28
Payer: COMMERCIAL

## 2022-12-28 ENCOUNTER — VIRTUAL VISIT (OUTPATIENT)
Dept: BEHAVIORAL HEALTH | Facility: CLINIC | Age: 27
End: 2022-12-28
Payer: COMMERCIAL

## 2022-12-28 DIAGNOSIS — F90.9 ATTENTION DEFICIT HYPERACTIVITY DISORDER (ADHD), UNSPECIFIED ADHD TYPE: ICD-10-CM

## 2022-12-28 DIAGNOSIS — E55.9 VITAMIN D DEFICIENCY: ICD-10-CM

## 2022-12-28 DIAGNOSIS — F41.1 GENERALIZED ANXIETY DISORDER: ICD-10-CM

## 2022-12-28 DIAGNOSIS — F50.819 BINGE EATING DISORDER: Primary | ICD-10-CM

## 2022-12-28 DIAGNOSIS — F33.42 RECURRENT MAJOR DEPRESSIVE DISORDER, IN FULL REMISSION (H): ICD-10-CM

## 2022-12-28 DIAGNOSIS — F33.1 MODERATE EPISODE OF RECURRENT MAJOR DEPRESSIVE DISORDER (H): Primary | ICD-10-CM

## 2022-12-28 DIAGNOSIS — F51.04 PSYCHOPHYSIOLOGICAL INSOMNIA: ICD-10-CM

## 2022-12-28 PROCEDURE — 90832 PSYTX W PT 30 MINUTES: CPT | Mod: GT | Performed by: COUNSELOR

## 2022-12-28 PROCEDURE — 99214 OFFICE O/P EST MOD 30 MIN: CPT | Mod: GT | Performed by: NURSE PRACTITIONER

## 2022-12-28 RX ORDER — BUPROPION HYDROCHLORIDE 100 MG/1
150 TABLET, EXTENDED RELEASE ORAL DAILY
Qty: 45 TABLET | Refills: 1 | Status: SHIPPED | OUTPATIENT
Start: 2022-12-28 | End: 2023-04-27

## 2022-12-28 NOTE — PROGRESS NOTES
[facility]  December 28, 2022      Behavioral Health Clinician Progress Note    Patient Name: Chelo Garcia           Service Type:  Individual      Service Location:   MyChart / Email (patient reached)     Session Start Time: 126pm  Session End Time: 142pm      Session Length: 16 - 37      Attendees: Patient     Service Modality:  Video Visit:      Provider verified identity through the following two step process.  Patient provided:  Patient is known previously to provider and Patient was verified at admission/transfer    Telemedicine Visit: The patient's condition can be safely assessed and treated via synchronous audio and visual telemedicine encounter.      Reason for Telemedicine Visit: Services only offered telehealth    Originating Site (Patient Location): Patient's home    Distant Site (Provider Location): Provider Remote Setting- Home Office    Consent:  The patient/guardian has verbally consented to: the potential risks and benefits of telemedicine (video visit) versus in person care; bill my insurance or make self-payment for services provided; and responsibility for payment of non-covered services.     Patient would like the video invitation sent by:  My Chart    Mode of Communication:  Video Conference via [place]    Distant Location (Provider):  Off-site    As the provider I attest to compliance with applicable laws and regulations related to telemedicine.    Visit Activities (Refresh list every visit): [facility] Only    Diagnostic Assessment Date: 12/14/2022  Treatment Plan Review Date: 3/28/2022  See Flowsheets for today's PHQ-9 and CHASE-7 results  Previous PHQ-9:   PHQ-9 SCORE 10/12/2022 12/14/2022 12/14/2022   PHQ-9 Total Score MyChart - - 10 (Moderate depression)   PHQ-9 Total Score 8 10 10     Previous CHASE-7:   CHASE-7 SCORE 10/5/2021 3/9/2022 12/14/2022   Total Score - 9 (mild anxiety) 6 (mild anxiety)   Total Score 3 9 6       JOSE MANUEL LEVEL:  No  "flowsheet data found.    DATA  Extended Session (60+ minutes): No  Interactive Complexity: No  Crisis: No  [facility] Patient: No    Treatment Objective(s) Addressed in This Session:  Target Behavior(s): increase focus, reduce depression and anxiety, and improve sleep    Depressed Mood: Decrease frequency and intensity of feeling down, depressed, hopeless  Improve quantity and quality of night time sleep / decrease daytime naps  Feel less tired and more energy during the day   Improve concentration, focus, and mindfulness in daily activities   Feel less fidgety, restless or slow in daily activities / interpersonal interactions  Anxiety: will experience a reduction in anxiety, will develop healthy cognitive patterns and beliefs and will increase ability to function adaptively  Psychological distress related to Sleep Disturbance    Current Stressors / Issues:   update: Pt reports that they started the Vyvanse on the 23rd and they had a couple of head aches and felt some pain and it went away. Pt says anxiety is still there. Pt states that their depression increased from going back to the office in October. Pt uses self care and will reduce feeling over whelmed. Pt has started a gratitude journal.    Stressors: nothing   Side Effects: upper pain and head aches, these side effects have decreased   Mood: \"been good, don't see a change in my mood\"  Appetite: normal, have not purged   Sleep: go into bed room at 9pm and takes until 11 or 12 pm to fall asleep     Impulsive spending/spending sprees: none  Suicidality: Pt denies   Self-harm: Pt denies   Substance Use: alcohol 1-2 times a week, cannabis helps to get pt to close their eyes   Caffeine: 1 coffee per day   Therapist: can't afford therapy at this time   Interventions: [facility] review with pt to get out of bed in able and try to retrain bed for sleep. [facility] celebrates with pt that they are using a gratitude journal.   Medication Questions/Requests: nothing, send over form to get " application for Vyvanse  Happy light        Progress on Treatment Objective(s) / Homework:  Minimal progress - ACTION (Actively working towards change); Intervened by reinforcing change plan / affirming steps taken    CBT: Pt has started gratitude journaling.     Motivational Interviewing    MI Intervention: Co-Developed Goal: reduce depression and anxiety and increase sleep, Expressed Empathy/Understanding, Supported Autonomy, Collaboration, Evocation, Permission to raise concern or advise, Open-ended questions, Reflections: simple and complex, Change talk (evoked) and Reframe     Change Talk Expressed by the Patient: Need to change Committment to change Taking steps    Provider Response to Change Talk: E - Evoked more info from patient about behavior change, A - Affirmed patient's thoughts, decisions, or attempts at behavior change, R - Reflected patient's change talk and S - Summarized patient's change talk statements    Also provided psychoeducation about behavioral health condition, symptoms, and treatment options    Care Plan review completed: Yes    Medication Review:  No changes to current psychiatric medication(s)    Medication Compliance:  Yes, has alarm and will eat when taking them     Changes in Health Issues:   None reported    Chemical Use Review:   Substance Use: Chemical use reviewed, no active concerns identified      Tobacco Use: No current tobacco use.      Assessment: Current Emotional / Mental Status (status of significant symptoms):  Risk status (Self / Other harm or suicidal ideation)  Patient has had a history of suicidal ideation: in past, last time was 2 years ago, pt never acted on these thoughts  Patient denies current fears or concerns for personal safety.  Patient denies current or recent suicidal ideation or behaviors.  Patient denies current or recent homicidal ideation or behaviors.  Patient denies current or recent self injurious behavior or ideation.  Patient denies other safety  concerns.  A safety and risk management plan has not been developed at this time, however patient was encouraged to call [place] [phone] should there be a change in any of these risk factors. [facility] provided pt with information last visit about [facility] and provided crisis resources.     Appearance:   Appropriate   Eye Contact:   Good   Psychomotor Behavior: Normal   Attitude:   Cooperative  Interested  Orientation:   All  Speech   Rate / Production: Normal    Volume:  Normal   Mood:    Anxious  Depressed   Affect:    Subdued  Worrisome   Thought Content:  Clear   Thought Form:  Coherent  Logical   Insight:    Good     Diagnoses:  1. Moderate episode of recurrent major depressive disorder (H)    2. Generalized anxiety disorder    3. Attention deficit hyperactivity disorder (ADHD), unspecified ADHD type    4. Psychophysiological insomnia        Collateral Reports Completed:  Communicated with:   Ghislaine Ramesh, APRN, CNP, PNP-PC, PMHNP-BC     Plan: (Homework, other):  Patient was given information about behavioral services and encouraged to schedule a follow up appointment with the clinic [facility] in 1 month.  She was also given information about mental health symptoms and treatment options  and [facility] reviews sleep hygiene.  CD Recommendations: No indications of CD issues.    CARLOS A Urbano, [facility] 12/28/2022      ______________________________________________________________________    Integrated Primary Care Behavioral Health Treatment Plan    Patient's Name: Chelo Garcia  YOB: 1995    Date of Creation: 12/28/2022  Date Treatment Plan Last Reviewed/Revised: 12/28/2022    DSM5 Diagnoses:   1. Moderate episode of recurrent major depressive disorder (H)    2. Generalized anxiety disorder    3. Attention deficit hyperactivity disorder (ADHD), unspecified ADHD type    4. Psychophysiological insomnia        Psychosocial / Contextual Factors: works, insomnia, worry, depression  PROMIS (reviewed every 90  days):   PROMIS-10 Scores Only Global Mental Health Score Global Physical Health Score   12/14/2022 10 13     PROMIS-10 Scores Only PROMIS TOTAL - SUBSCORES   12/14/2022 23       Referral / Collaboration:  Referral to another professional/service is not indicated at this time.    Anticipated number of session for this episode of care: 4-5  Anticipation frequency of session: Monthly  Anticipated Duration of each session: 16-37 minutes  Treatment plan will be reviewed in 90 days or when goals have been changed.       MeasurableTreatment Goal(s) related to diagnosis / functional impairment(s)  Goal 1: Patient will reduce depressive, anxiety, ADHD and insomnia symptoms.     I will know I've met my goal when I am able to simple hygiene and write in my gratitude journal.      Objective #A (Patient Action)    Patient will do simple hygiene care 2-3 times a week.    Status: New - Date: 12/28/2022     Intervention(s)  Therapist will provide support and encouragement to pt through CBT and MI to make progress towards this goal.     Objective #B  Patient will write in the gratitude journal every other day .  Status: New - Date: 12/28/2022     Intervention(s)  Therapist will inquire each meeting about pt's progress towards this goal and explore any barriers through CBT and MI.    Patient has reviewed and agreed to the above plan.      Kayla Jones, JERRY  December 28, 2022

## 2022-12-28 NOTE — PROGRESS NOTES
Chelo is a 27 year old who is being evaluated via a billable video visit.    How would you like to obtain your AVS? MyChart  If the video visit is dropped, the invitation should be resent by: Text to cell phone: [phone]  Will anyone else be joining your video visit? No    Video-Visit Details  Type of service:  Video Visit   Originating Location (pt. Location): Other work  Distant Location (provider location):  Off-site  Platform used for Video Visit: MessageGears   Video Start Time: 1:43 PM  Video End Time:  2:00 PM    COLLABORATIVE CARE PSYCHIATRY SERVICE  PROGRESS NOTE    CHIEF COMPLAINT  Follow up since starting Vyvanse.     HISTORY OF PRESENT ILLNESS  Binge eating disorder - Slight improvement. Started Vyvanse on 12/23/22. Taking daily. The first couple days did have an initial headache a couple hours after she took, otherwise no s/e.  Hasn't noticed any change with appetite or intake since starting Vyvanse but hasn't purged since. Having some trouble getting it covered by insurance. Filling out paperwork for assistance but needs a signature so plans to send this in for me to sign off on.     MDD - Mood remains stable on current medication regimen. No thoughts of self harm or suicidal thoughts. Was able to enjoy the holidays.     Anxiety - Anxiety is stable. No increase with Vyvanse. Increased anxiety prior to appointments even though she knows she has nothing to be anxious about. Happens before all appointments no matter how well she knows the person she is meeting with. Worries about being prepared and on time.     Labs - Found to have low vitamin D on lab work. Started on her vitamin D supplement at 5000 international units and will follow up with Dr. Barajas as recommended for both the vitamin D and the lipid abnormalities.     FAMILY HISTORY, PSYCHIATRIC HISTORY, SOCIAL HISTORY  No pertinent changes since 12/14/22.    PAST MEDICAL AND SURGICAL HISTORY  No pertinent changes since  12/14/22.    ALLERGIES  Allergies   Allergen Reactions     Short Ragweed Pollen Ext Difficulty breathing, Fatigue and Itching     CURRENT MEDICATION  Current Outpatient Medications   Medication Sig     buPROPion (WELLBUTRIN SR) 100 MG 12 hr tablet Take 1.5 tablets (150 mg) by mouth daily     ferrous sulfate (FEROSUL) 325 (65 Fe) MG tablet Take 325 mg by mouth daily (with breakfast)     sertraline (ZOLOFT) 100 MG tablet Take 1.5 tablets (150 mg) by mouth daily     VITAMIN D PO Take 5,000 Units by mouth daily     No current facility-administered medications for this visit.     PAST PSYCHOTROPIC MEDICATION  Wellbutrin SR up to 200 mg in April 2019 - resulted in shaking, reduced to 150 mg on 4/18/19.  Wellbutrin  mg QAM initially started on 2/1/19.    sertraline up to 200 mg daily.  Initiated 6.1.18 at 25 mg daily with titration up to 200 mg but had nausea, reduced back to 150 mg in February 2019. First and only SSRI trial.   hydroxyzine 25 mg q. 8 h. prn anxiety; started 6/1/18.  Doesn't remember effects. Didn't seem to help but may have been inconsistent in taking it.   trazodone 50 mg at at bedtime - was helpful to fall asleep but would be very groggy, tingly. Didn't want to take it consistently.     MENTAL STATUS EXAM  Vital signs:  Deferred due to virtual visit.  Appearance:  Alert, dressed appropriately for weather. Good hygiene. Seated in chair at a table at work.   Behavior:  Appropriate   Eye contact:  Good  Attitude:  Cooperative  Mood:  Euthymic  Affect:  Appropriate, mood congruent  Speech:  Normal  Thought process:  Logical  Thought content:  Normal. No suicidal or homicidal ideation.  Orientation:  x4  Memory and concentration:  Intact  Fund of knowledge:  Estimated within average range for age  Perception:  Intact. Does not appear to be responding to unseen/internal stimuli.   Impulse control:  Intact  Insight:  Average  Judgement:  Intact    DIAGNOSTICS/SCREENING  Labs:    Component      Latest Ref  Rng & Units 12/17/2022   WBC      4.0 - 11.0 10e3/uL 9.7   RBC Count      3.80 - 5.20 10e6/uL 4.52   Hemoglobin      11.7 - 15.7 g/dL 11.9   Hematocrit      35.0 - 47.0 % 37.3   MCV      78 - 100 fL 83   MCH      26.5 - 33.0 pg 26.3 (L)   MCHC      31.5 - 36.5 g/dL 31.9   RDW      10.0 - 15.0 % 15.1 (H)   Platelet Count      150 - 450 10e3/uL 286   % Neutrophils      % 67   % Lymphocytes      % 28   % Monocytes      % 5   % Eosinophils      % 1   % Basophils      % 0   Absolute Neutrophils      1.6 - 8.3 10e3/uL 6.5   Absolute Lymphocytes      0.8 - 5.3 10e3/uL 2.7   Absolute Monocytes      0.0 - 1.3 10e3/uL 0.5   Absolute Eosinophils      0.0 - 0.7 10e3/uL 0.1   Absolute Basophils      0.0 - 0.2 10e3/uL 0.0   Sodium      136 - 145 mmol/L 140   Potassium      3.4 - 5.3 mmol/L 3.7   Chloride      98 - 107 mmol/L 105   Carbon Dioxide (CO2)      22 - 29 mmol/L 24   Anion Gap      7 - 15 mmol/L 11   Urea Nitrogen      6.0 - 20.0 mg/dL 16.7   Creatinine      0.51 - 0.95 mg/dL 0.81   Calcium      8.6 - 10.0 mg/dL 9.0   Glucose      70 - 99 mg/dL 91   Alkaline Phosphatase      35 - 104 U/L 120 (H)   AST      10 - 35 U/L 22   ALT      10 - 35 U/L 10   Protein Total      6.4 - 8.3 g/dL 7.5   Albumin      3.5 - 5.2 g/dL 4.4   Bilirubin Total      <=1.2 mg/dL <0.2   GFR Estimate      >60 mL/min/1.73m2 >90   Cholesterol      <200 mg/dL 235 (H)   Triglycerides      <150 mg/dL 210 (H)   HDL Cholesterol      >=50 mg/dL 40 (L)   LDL Cholesterol Calculated      <=100 mg/dL 153 (H)   Non HDL Cholesterol      <130 mg/dL 195 (H)   TSH      0.30 - 4.20 uIU/mL 1.54   Vitamin D Deficiency screening      20 - 75 ug/L 11 (L)     Mental health screenings:  PHQ-2 ( 1999 [employer]) 8/13/2021 12/11/2020   Q1: Little interest or pleasure in doing things 0 0   Q2: Feeling down, depressed or hopeless 0 0   PHQ-2 Score 0 0   PHQ-2 Total Score (12-17 Years)- Positive if 3 or more points; Administer PHQ-A if positive 0 0   Q1: Little interest or  pleasure in doing things - Not at all   Q2: Feeling down, depressed or hopeless - Not at all   PHQ-2 Score - 0     PHQ 10/12/2022 12/14/2022 12/14/2022   PHQ-9 Total Score 8 10 10   Q9: Thoughts of better off dead/self-harm past 2 weeks Not at all Not at all Not at all   F/U: Thoughts of suicide or self-harm - - -   F/U: Self harm-plan - - -   F/U: Self-harm action - - -   F/U: Safety concerns - - -     CHASE-7 SCORE 10/5/2021 3/9/2022 12/14/2022   Total Score - 9 (mild anxiety) 6 (mild anxiety)   Total Score 3 9 6     PROMIS-10 Total Score w/o Sub Scores 12/14/2022   PROMIS TOTAL - SUBSCORES 23     DIAGNOSTIC IMPRESSION  Binge eating disorder, F50.81  Recurrent major depressive disorder, in full remission, F33.42     Differential diagnoses:  Rule out CHASE  Rule out ADHD, inattentive subtype  Monitor for bulimia nervosa    FORMULATION  Increase Vyvanse to 30 mg QAM. No other med changes today.  She will check with insurance about coverage and what is on formulary. Plan to titrate off of Wellbutrin SR if we are staying with the Vyvanse. Want to make sure it will be covered first.  If any increased anxiety, she will let me know and we can also decrease the Wellbutrin at that time. Reviewed abnormal labs again with pt, and she has started the vitamin D replacement and will follow up with PCP in the future as directed. Otherwise see formulation at intake on 12/14/22.     PLAN  Increase Vyvanse to 30 mg every morning.   Continue Wellbutrin  mg every morning.  Continue Zoloft (sertraline) 150 mg daily.   Continue your vitamin D supplement and follow up with PCP regarding lab results, as discussed.   Pt will send in forms for assistance with Vyvanse cost or check with insurance to see what is on their formulary.   Avoid mood-altering substances not prescribed to you.   Follow up with me in 1 month.   Please contact me via [place] with any non-urgent concerns or call [phone].   Call or text [phone] for mental health  crisis.   Call 911 or use ER for potentially life-threatening situations.      PATIENT STATUS:  Our psychiatry providers act as a specialty service for primary care providers in the [facility] who seek to optimize medications for unstable patients.  Once medications have been optimized, our providers discharge the patient back to the referring primary care provider for ongoing medication management.  This type of system allows our providers to serve a high volume of patients.     At this time: Patient will continue to be seen for ongoing consultation and stabilization.     BILLING NOTE:  30 minutes were spent performing chart review, patient assessment, documentation, and case management on the date of service.         LOUIE Crockett, CNP, PNP-PC, PMHNP-BC   [facility]ative Care Psychiatry Service ([facility])    Speech recognition software may be used to create portions of this document.  Attempts at proofreading have been made to minimize errors, though some may remain.

## 2022-12-28 NOTE — Clinical Note
Please call patient to schedule a follow-up appointment with myself and [facility] CARLOS A Urbano, JERRY in 1 month.  Thank you,   LOUIE Crockett, CNP, PNP-PC, PMHNP-BC  [facility]aborative Care Psychiatry Service ([facility])

## 2022-12-29 DIAGNOSIS — F50.819 BINGE EATING DISORDER: Primary | ICD-10-CM

## 2022-12-29 RX ORDER — LISDEXAMFETAMINE DIMESYLATE 30 MG/1
30 CAPSULE ORAL EVERY MORNING
Qty: 30 CAPSULE | Refills: 0 | Status: SHIPPED | OUTPATIENT
Start: 2022-12-29 | End: 2023-02-01

## 2023-01-04 NOTE — TELEPHONE ENCOUNTER
Provider completed their portion of the form. Sent copy to scanning. And mailed forms to patient to complete their portion.

## 2023-01-05 NOTE — CONFIDENTIAL NOTE
"    1) RN reviewed provider 01/04/23 message: \"I have completed my portion and faxed in to the &A fax number at [phone].  Patient will need to be called so it can be mailed or printed for her to , as she has to fill out her portion.    2) RN reviewed [facility] response: \"Provider completed their portion of the form. Sent copy to scanning. And mailed forms to patient to complete their portion.\"    3) RN spoke with pt on the phone and let her know that provider has filled out their part of the form she requested and that it has mailed out to her.     4) Pt verbalized appreciation, stating that she was just about to call the clinic to check in on the progress of her request.     5) Pt denied needing anything else from RN at this time and agreed to contact the clinic if she needs any further assistance.     6) Routing to provider as JESSICA.     Anusha Rodrigues RN on 1/5/2023 at 12:26 PM    "

## 2023-02-25 ENCOUNTER — TELEPHONE (OUTPATIENT)
Dept: INTERNAL MEDICINE | Facility: CLINIC | Age: 28
End: 2023-02-25
Payer: COMMERCIAL

## 2023-02-25 NOTE — TELEPHONE ENCOUNTER
Patient needs to schedule follow-up appointment in the clinic for further refills of her medications          =========================================      ----- Message from LOUIE Vega CNP sent at 2/1/2023  4:15 PM CST -----  Regarding: [facility] DISMISSAL  Good afternoon,     Pt has been dismissed from [facility] due to completion of services.   Pt to return to PCP.   PCP will need to place new referral for [facility]PS services if needed.   OP intake: please dimiss pt from [facility] at this time.     Thank you,     LOUIE Crockett, CNP, PNP-PC, PMHNP-BC   [facility]ative Care Psychiatry Service ([facility])

## 2023-04-18 ENCOUNTER — MYC MEDICAL ADVICE (OUTPATIENT)
Dept: INTERNAL MEDICINE | Facility: CLINIC | Age: 28
End: 2023-04-18
Payer: COMMERCIAL

## 2023-04-18 DIAGNOSIS — F41.1 GENERALIZED ANXIETY DISORDER: ICD-10-CM

## 2023-04-18 DIAGNOSIS — F33.2 SEVERE EPISODE OF RECURRENT MAJOR DEPRESSIVE DISORDER, WITHOUT PSYCHOTIC FEATURES (H): ICD-10-CM

## 2023-04-18 DIAGNOSIS — F50.819 BINGE EATING DISORDER: ICD-10-CM

## 2023-04-18 DIAGNOSIS — F33.42 RECURRENT MAJOR DEPRESSIVE DISORDER, IN FULL REMISSION (H): ICD-10-CM

## 2023-04-19 NOTE — TELEPHONE ENCOUNTER
Please see my message from February 25.   The patient has not scheduled appointments.  I did not mention about any blood test before the appointment  The next appointment, she may schedule it as an annual exam.  I hope she will schedule the appointment in a timely matter so she can have the refills on time.  No further refills if the patient does not have a future appointment

## 2023-04-26 NOTE — TELEPHONE ENCOUNTER
I see that now she has an appointment on July 28.    RN please verify which medication she needs refills and approve them for 6 months    I am not sure which [facility] she is referring to.  As far as I am concerned she is requesting a prescription for Dr. Barrios

## 2023-04-27 RX ORDER — BUPROPION HYDROCHLORIDE 100 MG/1
150 TABLET, EXTENDED RELEASE ORAL DAILY
Qty: 45 TABLET | Refills: 5 | Status: SHIPPED | OUTPATIENT
Start: 2023-04-27 | End: 2023-07-28 | Stop reason: DRUGHIGH

## 2023-04-27 RX ORDER — SERTRALINE HYDROCHLORIDE 100 MG/1
150 TABLET, FILM COATED ORAL DAILY
Qty: 45 TABLET | Refills: 5 | Status: SHIPPED | OUTPATIENT
Start: 2023-04-27 | End: 2023-07-28

## 2023-06-25 ENCOUNTER — E-VISIT (OUTPATIENT)
Dept: URGENT CARE | Facility: CLINIC | Age: 28
End: 2023-06-25
Payer: COMMERCIAL

## 2023-06-25 DIAGNOSIS — H10.30 ACUTE BACTERIAL CONJUNCTIVITIS, UNSPECIFIED LATERALITY: Primary | ICD-10-CM

## 2023-06-25 PROCEDURE — 99207 PR NON-BILLABLE SERV PER CHARTING: CPT | Performed by: FAMILY MEDICINE

## 2023-06-25 RX ORDER — POLYMYXIN B SULFATE AND TRIMETHOPRIM 1; 10000 MG/ML; [USP'U]/ML
SOLUTION OPHTHALMIC
Qty: 10 ML | Refills: 0 | Status: SHIPPED | OUTPATIENT
Start: 2023-06-25 | End: 2023-07-02

## 2023-06-25 NOTE — PATIENT INSTRUCTIONS
Thank you for choosing us for your care. I have placed an order for a prescription so that you can start treatment. View your full visit summary for details by clicking on the link below. Your pharmacist will able to address any questions you may have about the medication.     If you re not feeling better within 2-3 days, please schedule an appointment.  You can schedule an appointment right here in [facility], or call [phone]  If the visit is for the same symptoms as your eVisit, we ll refund the cost of your eVisit if seen within seven days.

## 2023-06-28 ENCOUNTER — E-VISIT (OUTPATIENT)
Dept: URGENT CARE | Facility: CLINIC | Age: 28
End: 2023-06-28
Payer: COMMERCIAL

## 2023-06-28 ENCOUNTER — OFFICE VISIT (OUTPATIENT)
Dept: URGENT CARE | Facility: URGENT CARE | Age: 28
End: 2023-06-28
Payer: COMMERCIAL

## 2023-06-28 VITALS
TEMPERATURE: 98.2 F | DIASTOLIC BLOOD PRESSURE: 95 MMHG | SYSTOLIC BLOOD PRESSURE: 138 MMHG | OXYGEN SATURATION: 96 % | HEART RATE: 76 BPM

## 2023-06-28 DIAGNOSIS — H10.33 ACUTE BACTERIAL CONJUNCTIVITIS OF BOTH EYES: Primary | ICD-10-CM

## 2023-06-28 DIAGNOSIS — H10.023 PINK EYE, BILATERAL: Primary | ICD-10-CM

## 2023-06-28 PROCEDURE — 99203 OFFICE O/P NEW LOW 30 MIN: CPT

## 2023-06-28 PROCEDURE — 99207 PR NON-BILLABLE SERV PER CHARTING: CPT | Performed by: NURSE PRACTITIONER

## 2023-06-28 RX ORDER — OFLOXACIN 3 MG/ML
SOLUTION/ DROPS OPHTHALMIC
Qty: 5 ML | Refills: 0 | Status: SHIPPED | OUTPATIENT
Start: 2023-06-28 | End: 2023-07-05

## 2023-06-28 NOTE — PATIENT INSTRUCTIONS
Dear Chelo Garcia,    We are sorry you are not feeling well. Based on the responses you provided, it is recommended that you be seen in-person in urgent care so we can better evaluate your symptoms. Please click here to find the nearest urgent care location to you.   You will not be charged for this Visit. Thank you for trusting us with your care.    LOUIE Allred CNP

## 2023-06-28 NOTE — PROGRESS NOTES
Chief Complaint   Patient presents with     Conjunctivitis     Swollen eye lower/upper lid redness, crusting ongoing -seen 6/25 E-visit by Gita - left eye has gotten infected        ASSESSMENT/PLAN:  Chelo was seen today for conjunctivitis and conjunctivitis.    Diagnoses and all orders for this visit:    Acute bacterial conjunctivitis of both eyes  -     ofloxacin (OCUFLOX) 0.3 % ophthalmic solution; 1-2 drops in affected eye(s) every 2-4 hrs while awake for 2 days and then 4 times a day for 5 days.    Still having some discharge from the right eye and new onset discharge from the left eye with associated blepharitis.    Start warm compresses, switch to Ocuflox above.  No red flag symptoms today    Ottoniel Silva PA-C      SUBJECTIVE:  Chelo is a 28 year old female who presents to urgent care with concerns about pinkeye.  3 days ago developed redness and discharge from the right eye and placed on Polytrim drops which have been improving but still having some mild redness and discharge.  Yesterday her left eye started becoming red, eyelid swollen and having some discharge.  Does not wear contacts.  No pain in the eye with extraocular movements, no photophobia, no blurry vision.  Has some nasal congestion but otherwise feels fine.    ROS: Pertinent ROS neg other than the symptoms noted above in the HPI.     OBJECTIVE:  BP (!) 138/95   Pulse 76   Temp 98.2  F (36.8  C) (Tympanic)   SpO2 96%    GENERAL: healthy, alert and no distress  EYES: Left conjuncta  mildly erythematous with dried discharge at the eyelid, right conjunctival moderately erythematous, dry discharge on the eyelids, mild erythema and swelling of the upper and lower eyelids, erythema of the eyelid mucosa  HENT: ear canals and TM's normal, nose and mouth without ulcers or lesions  NECK: no adenopathy, nontender  DIAGNOSTICS    No results found for any visits on 06/28/23.     Current Outpatient Medications   Medication     buPROPion  (WELLBUTRIN SR) 100 MG 12 hr tablet     ferrous sulfate (FEROSUL) 325 (65 Fe) MG tablet     sertraline (ZOLOFT) 100 MG tablet     trimethoprim-polymyxin b (POLYTRIM) 12440-6.1 UNIT/ML-% ophthalmic solution     VITAMIN D PO     No current facility-administered medications for this visit.      Patient Active Problem List   Diagnosis     BMI 28.0-28.9,adult     Major depressive disorder, recurrent episode, severe (H)     Generalized anxiety disorder     Psychophysiological insomnia     Current occasional smoker     Cannabis abuse, episodic use     Encounter for triage in pregnant patient     Indication for care in labor or delivery     Vitamin D deficiency      Past Medical History:   Diagnosis Date     Chronic midline low back pain without sciatica 2017     Depressive disorder 2017     Overweight 2017     Proteinuria     small protein on screening UA - recheck     Past Surgical History:   Procedure Laterality Date     AS INDUCED ABORTN BY D&C  2019      SECTION N/A 2021    Procedure:  SECTION;  Surgeon: John Kim MD;  Location: [facility] OR     Family History   Problem Relation Age of Onset     Ovarian Cancer Mother         first had cervical caner , 2016 got ovarian caner at age 38      Cervical Cancer Mother      Mental Illness Mother      Bipolar Disorder Mother      No Known Problems Father      Breast Cancer Maternal Great-Grandmother      Social History     Tobacco Use     Smoking status: Never     Smokeless tobacco: Never   Substance Use Topics     Alcohol use: Not Currently     Comment: Socially, once a week              The plan of care was discussed with the patient. They understand and agree with the course of treatment prescribed. A printed summary was given including instructions and medications.  The use of Dragon/[employer] dictation services may have been used to construct the content in this note; any grammatical or spelling errors are non-intentional.  Please contact the author of this note directly if you are in need of any clarification.

## 2023-06-28 NOTE — PATIENT INSTRUCTIONS
Bacterial Conjunctivitis    You have an infection in the membranes covering the white part of the eye. This part of the eye is called the conjunctiva. The infection is called conjunctivitis. The most common symptoms of conjunctivitis include a thick, pus-like discharge from the eye, swollen eyelids, redness, eyelids sticking together upon awakening, and a gritty or scratchy feeling in the eye. Your infection was caused by bacteria. It may be treated with medicine. With treatment, the infection takes about 7 to 10 days to resolve.   Home care    Use prescribed antibiotic eye drops or ointment as directed to treat the infection.    Apply a warm compress (towel soaked in warm water) to the affected eye 3 to 4 times a day. Do this just before applying medicine to the eye.    Use a warm, wet cloth to wipe away crusting of the eyelids in the morning. This is caused by mucus drainage during the night. You may also use saline irrigating solution or artificial tears to rinse away mucus in the eye. Do not put a patch over the eye.    Wash your hands before and after touching the infected eye. This is to prevent spreading the infection to the other eye, and to other people. Don't share your towels or washcloths with others.    You may use acetaminophen or ibuprofen to control pain, unless another medicine was prescribed. Talk with your healthcare provider before using these medicines if you have chronic liver or kidney disease. Also talk with your provider if you have ever had a stomach ulcer or digestive bleeding.    Don't wear contact lenses until your eyes have healed and all symptoms are gone.    Follow-up care  Follow up with your healthcare provider, or as advised.  When to seek medical advice  Call your healthcare provider right away if any of these occur:    Worsening vision    Increasing pain in the eye    Increasing swelling or redness of the eyelid    Redness spreading around the eye  [employer] last reviewed this  educational content on 4/1/2020 2000-2022 [employer]. All rights reserved. This information is not intended as a substitute for professional medical care. Always follow your healthcare professional's instructions.

## 2023-07-28 ENCOUNTER — OFFICE VISIT (OUTPATIENT)
Dept: INTERNAL MEDICINE | Facility: CLINIC | Age: 28
End: 2023-07-28
Payer: COMMERCIAL

## 2023-07-28 VITALS
HEIGHT: 64 IN | WEIGHT: 206.4 LBS | TEMPERATURE: 97.2 F | BODY MASS INDEX: 35.24 KG/M2 | SYSTOLIC BLOOD PRESSURE: 118 MMHG | RESPIRATION RATE: 18 BRPM | OXYGEN SATURATION: 98 % | HEART RATE: 91 BPM | DIASTOLIC BLOOD PRESSURE: 79 MMHG

## 2023-07-28 DIAGNOSIS — F33.2 SEVERE EPISODE OF RECURRENT MAJOR DEPRESSIVE DISORDER, WITHOUT PSYCHOTIC FEATURES (H): ICD-10-CM

## 2023-07-28 DIAGNOSIS — R06.83 SNORES: ICD-10-CM

## 2023-07-28 DIAGNOSIS — F41.1 GENERALIZED ANXIETY DISORDER: ICD-10-CM

## 2023-07-28 DIAGNOSIS — F50.819 BINGE EATING DISORDER: ICD-10-CM

## 2023-07-28 DIAGNOSIS — F33.42 RECURRENT MAJOR DEPRESSIVE DISORDER, IN FULL REMISSION (H): ICD-10-CM

## 2023-07-28 DIAGNOSIS — R53.83 OTHER FATIGUE: ICD-10-CM

## 2023-07-28 DIAGNOSIS — Z00.00 ROUTINE GENERAL MEDICAL EXAMINATION AT A HEALTH CARE FACILITY: Primary | ICD-10-CM

## 2023-07-28 DIAGNOSIS — E78.5 HYPERLIPIDEMIA LDL GOAL <160: ICD-10-CM

## 2023-07-28 PROBLEM — Z72.0 CURRENT OCCASIONAL SMOKER: Status: RESOLVED | Noted: 2019-12-25 | Resolved: 2023-07-28

## 2023-07-28 PROCEDURE — 99395 PREV VISIT EST AGE 18-39: CPT | Performed by: INTERNAL MEDICINE

## 2023-07-28 PROCEDURE — 99214 OFFICE O/P EST MOD 30 MIN: CPT | Mod: 25 | Performed by: INTERNAL MEDICINE

## 2023-07-28 RX ORDER — BUPROPION HYDROCHLORIDE 150 MG/1
150 TABLET ORAL EVERY MORNING
Qty: 90 TABLET | Refills: 4 | Status: SHIPPED | OUTPATIENT
Start: 2023-07-28 | End: 2023-10-24

## 2023-07-28 RX ORDER — SERTRALINE HYDROCHLORIDE 100 MG/1
150 TABLET, FILM COATED ORAL DAILY
Qty: 135 TABLET | Refills: 4 | Status: SHIPPED | OUTPATIENT
Start: 2023-07-28 | End: 2023-10-27

## 2023-07-28 ASSESSMENT — ENCOUNTER SYMPTOMS
NERVOUS/ANXIOUS: 0
FREQUENCY: 0
SHORTNESS OF BREATH: 0
DIZZINESS: 0
CHILLS: 0
HEMATOCHEZIA: 0
HEADACHES: 0
DYSURIA: 0
COUGH: 0
PARESTHESIAS: 0
SORE THROAT: 0
ABDOMINAL PAIN: 0
PALPITATIONS: 0
MYALGIAS: 0
DIARRHEA: 0
CONSTIPATION: 0
NAUSEA: 0
HEARTBURN: 0
ARTHRALGIAS: 0
HEMATURIA: 0
BREAST MASS: 0
EYE PAIN: 0
FEVER: 0
WEAKNESS: 0
JOINT SWELLING: 0

## 2023-07-28 ASSESSMENT — PATIENT HEALTH QUESTIONNAIRE - PHQ9
10. IF YOU CHECKED OFF ANY PROBLEMS, HOW DIFFICULT HAVE THESE PROBLEMS MADE IT FOR YOU TO DO YOUR WORK, TAKE CARE OF THINGS AT HOME, OR GET ALONG WITH OTHER PEOPLE: NOT DIFFICULT AT ALL
SUM OF ALL RESPONSES TO PHQ QUESTIONS 1-9: 4
SUM OF ALL RESPONSES TO PHQ QUESTIONS 1-9: 4

## 2023-07-28 ASSESSMENT — PAIN SCALES - GENERAL: PAINLEVEL: NO PAIN (0)

## 2023-07-28 NOTE — PROGRESS NOTES
Dr Barrios's note    Patient's instructions / PLAN:                                                        Plan:  Vitamin D 3 2000 units daily   2. Continue same meds, same doses for now   3. Sleep center referral   4.  Reschedule pap       ASSESSMENT & PLAN:                                                      (Z00.00) Routine general edical examination at a health care facility  (primary encounter diagnosis)  Comment:   Plan:     (R53.83) Other fatigue  Comment:   Plan: Adult Sleep Eval & Management          Referral            (R06.83) Snores  Comment:   Plan: Adult Sleep Eval & Management          Referral            (F50.81) Binge eating disorder  (F33.42) Recurrent major depressive disorder, in full remission (H)  (F41.1) Generalized anxiety disorder  (F33.2) Severe episode of recurrent major depressive disorder, without psychotic features (H)  Comment:   Stable on the present doses  Plan: sertraline (ZOLOFT) 100 MG tablet, buPROPion         (WELLBUTRIN XL) 150 MG 24 hr tablet            (E78.5) Hyperlipidemia LDL goal <160  Comment:   Plan: We discussed about low-cholesterol diet.  No statins at this stage          Chief Complaint:                                                        Annual exam  Follow up chronic medical problems      SUBJECTIVE:                                                    History of present illness     We reviewed the chronic medical problems as above.   I reviewed the recent tests results in Epic     Labs Dec 2022 -- Discussed    Vit D def -- Discussed    HLip -- Discussed    Fatigue, snores --> suspect RONY    ROS:     See below      PMHx: - reviewed  Past Medical History:   Diagnosis Date    Chronic midline low back pain without sciatica 7/24/2017    Depressive disorder April 2017    Overweight 7/24/2017    Proteinuria     small protein on screening UA - recheck       PSHx: reviewed  Past Surgical History:   Procedure Laterality Date    AS INDUCED ABORTN BY  "D&C  2019     SECTION N/A 2021    Procedure:  SECTION;  Surgeon: John Kim MD;  Location:  [place]        [facility] [place] Hx: No daily alcohol, no smoking  Social History     Socioeconomic History    Marital status: Single     Spouse name: Raymond Kaplan    Number of children: Not on file    Years of education: Not on file    Highest education level: Not on file   Occupational History    Not on file   Tobacco Use    Smoking status: Never     Passive exposure: Never    Smokeless tobacco: Never   Vaping Use    Vaping Use: Never used   Substance and Sexual Activity    Alcohol use: Not Currently     Comment: Socially, once a week    Drug use: Yes     Types: Marijuana     Comment: Occasional use; past use of hallucinogens (not since ).    Sexual activity: Yes     Partners: Male     Comment: Pregnant    Other Topics Concern    Parent/sibling w/ CABG, MI or angioplasty before 65F 55M? No   Social History Narrative    2019: Seen after gap of many months.  Lost her insurance.  Did not take birth control.  Got pregnant.  Had an  in early dec surgically.  Lost her 10-year-old half-sister on her maternal side she was 10 years old and apparently aspirated in the night.  Now working in [employer].  Continues to live with her roommate and her cat \" Dooby\".         2019: lives with room mate and cat dooby. Works one job. Close to maternal grandparents . Maternal grandfather not doing well due to chronic illness. Mother white bipolar  Not good relationship with her, dad form [facility], on parole for minor misdemeanor , getting deported due to immigration issues back to [facility]. Has a good friend and also roommate is her support. Has a safety plan in place. Does online counseling. Recently broke up boyfriend in 2019.        2018:got a cat         Graduated from [employer] major, 2017. 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      Social Determinants of Health     Financial Resource Strain: Low Risk  (12/14/2020)    Overall Financial Resource Strain (CARDIA)     Difficulty of Paying Living Expenses: Not hard at all   Food Insecurity: No Food Insecurity (12/14/2020)    Hunger Vital Sign     Worried About Running Out of Food in the Last Year: Never true     Ran Out of Food in the Last Year: Never true   Transportation Needs: No Transportation Needs (12/14/2020)    PRAPARE - Transportation     Lack of Transportation (Medical): No     Lack of Transportation (Non-Medical): No   Physical Activity: Not on file   Stress: Not on file   Social Connections: Not on file   Intimate Partner Violence: Not on file   Housing Stability: Not on file        Fam Hx: reviewed  Family History   Problem Relation Age of Onset    Ovarian Cancer Mother         first had cervical caner , 2016 got ovarian caner at age 38     Cervical Cancer Mother     Mental Illness Mother     Bipolar Disorder Mother     No Known Problems Father     Breast Cancer Maternal Great-Grandmother          Screening: reviewed      All: reviewed    Meds: reviewed  Current Outpatient Medications   Medication Sig Dispense Refill    buPROPion (WELLBUTRIN SR) 100 MG 12 hr tablet Take 1.5 tablets (150 mg) by mouth daily 45 tablet 5    ferrous sulfate (FEROSUL) 325 (65 Fe) MG tablet Take 325 mg by mouth daily (with breakfast)      sertraline (ZOLOFT) 100 MG tablet Take 1.5 tablets (150 mg) by mouth daily 45 tablet 5    VITAMIN D PO Take 5,000 Units by mouth daily         OBJECTIVE:                                                    Physical Exam :  Blood pressure 118/79, pulse 91, temperature 97.2  F (36.2  C), temperature source Tympanic, resp. rate 18, height 1.626 m (5' 4\"), weight 93.6 kg (206 lb 6.4 oz), SpO2 98 %, not currently breastfeeding.     NAD, appears comfortable  Skin clear, no rashes  HEENT: PERRLA, EOMI, anicteric sclera, pink conjunctiva, external ears appear " normal, bilateral tympanic membranes clinically normal, oropharynx normal color.   Neck: supple, no JVD,  no thyroidmegaly  Lymph nodes non palpable in the cervical, supraclavicular axillaries,   Chest: clear to auscultation with good respiratory effort  Cardiac: S1S2, RRR, no mgr appreciated  Abdomen: soft, not tender, not distended, audible bowel sound, no hepatosplenomegaly, no palpable masses, no abdominal bruits  Extremities: no cyanosis, clubbing or edema.   Neuro: A, Ox3, no focal signs.  Breast exam in supine and erect position: they are symmetrical, no skin changes, no tenderness or nodes on palpation. Nipples are erect, no skin lesions, no discharge on pressure.    Pelvic exam: rescheduled -- period today         Elidia Barrios MD  Internal Medicine        SUBJECTIVE:   CC: Chelo is an 28 year old who presents for preventive health visit.       7/28/2023     7:50 AM   Additional Questions   Roomed by Nisreen Still       Healthy Habits:     Getting at least 3 servings of Calcium per day:  NO    Bi-annual eye exam:  NO    Dental care twice a year:  NO    Sleep apnea or symptoms of sleep apnea:  Daytime drowsiness and Excessive snoring    Diet:  Regular (no restrictions)    Frequency of exercise:  2-3 days/week    Duration of exercise:  15-30 minutes    Taking medications regularly:  Yes    Medication side effects:  None    Additional concerns today:  No      Today's PHQ-9 Score:       7/28/2023     7:41 AM   PHQ-9 SCORE   PHQ-9 Total Score MyChart 4 (Minimal depression)   PHQ-9 Total Score 4                       Social History     Tobacco Use    Smoking status: Never     Passive exposure: Never    Smokeless tobacco: Never   Substance Use Topics    Alcohol use: Not Currently     Comment: Socially, once a week             7/28/2023     7:44 AM   Alcohol Use   Prescreen: >3 drinks/day or >7 drinks/week? No     Reviewed orders with patient.  Reviewed health maintenance and updated orders accordingly - Yes  Labs  reviewed in EPIC    Breast Cancer Screening:    FHS-7:       7/28/2023     7:46 AM   Breast CA Risk Assessment (FHS-7)   Did any of your first-degree relatives have breast or ovarian cancer? Yes   Did any of your relatives have bilateral breast cancer? Unknown   Did any man in your family have breast cancer? No   Did any woman in your family have breast and ovarian cancer? Unknown   Did any woman in your family have breast cancer before age 50 y? Yes   Do you have 2 or more relatives with breast and/or ovarian cancer? Yes   Do you have 2 or more relatives with breast and/or bowel cancer? No         Pertinent mammograms are reviewed under the imaging tab.    History of abnormal Pap smear:       Latest Ref Rng & Units 4/18/2019    10:56 AM 4/18/2019     8:34 AM   PAP / HPV   PAP (Historical)   NIL    HPV 16 DNA NEG^Negative Negative     HPV 18 DNA NEG^Negative Negative     Other HR HPV NEG^Negative Negative       Reviewed and updated as needed this visit by clinical staff   Tobacco  Allergies  Meds   Med Hx  Surg Hx  Fam Hx          Reviewed and updated as needed this visit by Provider                     Review of Systems   Constitutional:  Negative for chills and fever.   HENT:  Negative for congestion, ear pain, hearing loss and sore throat.    Eyes:  Negative for pain and visual disturbance.   Respiratory:  Negative for cough and shortness of breath.    Cardiovascular:  Negative for chest pain, palpitations and peripheral edema.   Gastrointestinal:  Negative for abdominal pain, constipation, diarrhea, heartburn, hematochezia and nausea.   Breasts:  Negative for tenderness, breast mass and discharge.   Genitourinary:  Negative for dysuria, frequency, genital sores, hematuria, pelvic pain, urgency, vaginal bleeding and vaginal discharge.   Musculoskeletal:  Negative for arthralgias, joint swelling and myalgias.   Skin:  Negative for rash.   Neurological:  Negative for dizziness, weakness, headaches and  "paresthesias.   Psychiatric/Behavioral:  Negative for mood changes. The patient is not nervous/anxious.        Patient has been advised of split billing requirements and indicates understanding: Yes At the check in, at the        COUNSELING:  Reviewed preventive health counseling, as reflected in patient instructions       Regular exercise       Healthy diet/nutrition      BMI:   Estimated body mass index is 35.43 kg/m  as calculated from the following:    Height as of this encounter: 1.626 m (5' 4\").    Weight as of this encounter: 93.6 kg (206 lb 6.4 oz).   Weight management plan: Discussed healthy diet and exercise guidelines      She reports that she has never smoked. She has never been exposed to tobacco smoke. She has never used smokeless tobacco.          Elidia Barajas MD  [facility] Center[facility]swers submitted by the patient for this visit:  Patient Health Questionnaire (Submitted on 7/28/2023)  If you checked off any problems, how difficult have these problems made it for you to do your work, take care of things at home, or get along with other people?: Not difficult at all  PHQ9 TOTAL SCORE: 4    "

## 2023-07-28 NOTE — PATIENT INSTRUCTIONS
Plan:  Vitamin D 3 2000 units daily   2. Continue same meds, same doses for now   3. Sleep center referral   4.  Reschedule pap

## 2023-08-03 ENCOUNTER — OFFICE VISIT (OUTPATIENT)
Dept: INTERNAL MEDICINE | Facility: CLINIC | Age: 28
End: 2023-08-03
Payer: COMMERCIAL

## 2023-08-03 VITALS
DIASTOLIC BLOOD PRESSURE: 80 MMHG | HEART RATE: 82 BPM | BODY MASS INDEX: 35.24 KG/M2 | HEIGHT: 64 IN | RESPIRATION RATE: 18 BRPM | WEIGHT: 206.4 LBS | SYSTOLIC BLOOD PRESSURE: 115 MMHG | OXYGEN SATURATION: 96 % | TEMPERATURE: 97.4 F

## 2023-08-03 DIAGNOSIS — Z12.4 CERVICAL CANCER SCREENING: Primary | ICD-10-CM

## 2023-08-03 LAB
C TRACH DNA SPEC QL NAA+PROBE: NEGATIVE
N GONORRHOEA DNA SPEC QL NAA+PROBE: NEGATIVE

## 2023-08-03 PROCEDURE — 87591 N.GONORRHOEAE DNA AMP PROB: CPT | Performed by: INTERNAL MEDICINE

## 2023-08-03 PROCEDURE — 87491 CHLMYD TRACH DNA AMP PROBE: CPT | Performed by: INTERNAL MEDICINE

## 2023-08-03 PROCEDURE — G0145 SCR C/V CYTO,THINLAYER,RESCR: HCPCS | Performed by: INTERNAL MEDICINE

## 2023-08-03 PROCEDURE — 99207 PR NO CHARGE LOS: CPT | Performed by: INTERNAL MEDICINE

## 2023-08-03 ASSESSMENT — PAIN SCALES - GENERAL: PAINLEVEL: NO PAIN (0)

## 2023-08-03 NOTE — PROGRESS NOTES
Pelvic exam: Normal external genitals, normal appearing perineum, normal appearing urethra,  vaginal mucosa pink, no discharge, Cervix appears normal, Pap smear obtained. On bimanual exam, I did not feel any uterus or ovarian masses, and she denies any tenderness.     Today exam is part of the physical exam done July 28, 2023    No charge                        Answers submitted by the patient for this visit:  General Questionnaire (Submitted on 8/3/2023)  Chief Complaint: Chronic problems general questions HPI Form  What is the reason for your visit today? : Pap  How many servings of fruits and vegetables do you eat daily?: 0-1  On average, how many sweetened beverages do you drink each day (Examples: soda, juice, sweet tea, etc.  Do NOT count diet or artificially sweetened beverages)?: 0  How many minutes a day do you exercise enough to make your heart beat faster?: 10 to 19  How many days a week do you exercise enough to make your heart beat faster?: 3 or less  How many days per week do you miss taking your medication?: 0

## 2023-08-07 LAB
BKR LAB AP GYN ADEQUACY: NORMAL
BKR LAB AP GYN INTERPRETATION: NORMAL
BKR LAB AP HPV REFLEX: NORMAL
BKR LAB AP PREVIOUS ABNORMAL: NORMAL
PATH REPORT.COMMENTS IMP SPEC: NORMAL
PATH REPORT.COMMENTS IMP SPEC: NORMAL
PATH REPORT.RELEVANT HX SPEC: NORMAL

## 2023-10-24 ENCOUNTER — MYC MEDICAL ADVICE (OUTPATIENT)
Dept: INTERNAL MEDICINE | Facility: CLINIC | Age: 28
End: 2023-10-24
Payer: COMMERCIAL

## 2023-10-24 DIAGNOSIS — F50.819 BINGE EATING DISORDER: ICD-10-CM

## 2023-10-24 DIAGNOSIS — F33.42 RECURRENT MAJOR DEPRESSIVE DISORDER, IN FULL REMISSION (H): ICD-10-CM

## 2023-10-24 DIAGNOSIS — F33.2 SEVERE EPISODE OF RECURRENT MAJOR DEPRESSIVE DISORDER, WITHOUT PSYCHOTIC FEATURES (H): ICD-10-CM

## 2023-10-24 DIAGNOSIS — F41.1 GENERALIZED ANXIETY DISORDER: ICD-10-CM

## 2023-10-26 RX ORDER — BUPROPION HYDROCHLORIDE 150 MG/1
150 TABLET ORAL EVERY MORNING
Qty: 30 TABLET | Refills: 12 | Status: SHIPPED | OUTPATIENT
Start: 2023-10-26 | End: 2024-04-02 | Stop reason: SINTOL

## 2023-10-26 NOTE — TELEPHONE ENCOUNTER
Pending Prescriptions:                       Disp   Refills    buPROPion (WELLBUTRIN SR) 100 MG 12 hr tab*45 tab*6        Sig: TAKE 1 AND 1/2 TABLETS(150 MG) BY MOUTH DAILY    Routing refill request to provider for review/approval because:  PHQ-9 score:    PHQ 3/9/2022   PHQ-9 Total Score 9   Q9: Thoughts of better off dead/self-harm past 2 weeks Not at all   F/U: Thoughts of suicide or self-harm -   F/U: Self harm-plan -   F/U: Self-harm action -   F/U: Safety concerns -                    Elidel Counseling: Patient may experience a mild burning sensation during topical application. Elidel is not approved in children less than 2 years of age. There have been case reports of hematologic and skin malignancies in patients using topical calcineurin inhibitors although causality is questionable.

## 2023-10-26 NOTE — TELEPHONE ENCOUNTER
Pharmacies cannot change a 90 day supply to a 30 day supply, a new prescription needs to be sent in so patient can refill 30 days at a time.

## 2023-10-27 RX ORDER — SERTRALINE HYDROCHLORIDE 100 MG/1
150 TABLET, FILM COATED ORAL DAILY
Qty: 45 TABLET | Refills: 11 | Status: SHIPPED | OUTPATIENT
Start: 2023-10-27 | End: 2024-04-02

## 2023-10-29 ENCOUNTER — E-VISIT (OUTPATIENT)
Dept: INTERNAL MEDICINE | Facility: CLINIC | Age: 28
End: 2023-10-29
Payer: COMMERCIAL

## 2023-10-29 DIAGNOSIS — N39.0 ACUTE UTI (URINARY TRACT INFECTION): Primary | ICD-10-CM

## 2023-10-29 PROCEDURE — 99421 OL DIG E/M SVC 5-10 MIN: CPT | Performed by: NURSE PRACTITIONER

## 2023-10-29 RX ORDER — NITROFURANTOIN 25; 75 MG/1; MG/1
100 CAPSULE ORAL 2 TIMES DAILY
Qty: 10 CAPSULE | Refills: 0 | Status: SHIPPED | OUTPATIENT
Start: 2023-10-29 | End: 2023-11-03

## 2023-10-29 NOTE — PATIENT INSTRUCTIONS
Dear Chelo Garcia    After reviewing your responses, I've been able to diagnose you with a urinary tract infection, which is a common infection of the bladder with bacteria.  This is not a sexually transmitted infection, though urinating immediately after intercourse can help prevent infections.  Drinking lots of fluids is also helpful to clear your current infection and prevent the next one.      I have sent a prescription for antibiotics to your pharmacy to treat this infection.    It is important that you take all of your prescribed medication even if your symptoms are improving after a few doses.  Taking all of your medicine helps prevent the symptoms from returning.     If your symptoms worsen, you develop pain in your back or stomach, develop fevers, or are not improving in 5 days, please contact your primary care provider for an appointment or visit any of our convenient Walk-in or Urgent Care Centers to be seen, which can be found on our website here.    Thanks again for choosing us as your health care partner,    LOUIE Monteiro CNP

## 2023-12-23 ENCOUNTER — E-VISIT (OUTPATIENT)
Dept: INTERNAL MEDICINE | Facility: CLINIC | Age: 28
End: 2023-12-23
Payer: COMMERCIAL

## 2023-12-23 DIAGNOSIS — F33.2 SEVERE EPISODE OF RECURRENT MAJOR DEPRESSIVE DISORDER, WITHOUT PSYCHOTIC FEATURES (H): ICD-10-CM

## 2023-12-23 DIAGNOSIS — F50.819 BINGE EATING DISORDER: ICD-10-CM

## 2023-12-23 DIAGNOSIS — F41.1 GENERALIZED ANXIETY DISORDER: ICD-10-CM

## 2023-12-23 DIAGNOSIS — F33.42 RECURRENT MAJOR DEPRESSIVE DISORDER, IN FULL REMISSION (H): ICD-10-CM

## 2023-12-23 PROCEDURE — 99421 OL DIG E/M SVC 5-10 MIN: CPT | Performed by: INTERNAL MEDICINE

## 2023-12-23 ASSESSMENT — ANXIETY QUESTIONNAIRES
1. FEELING NERVOUS, ANXIOUS, OR ON EDGE: MORE THAN HALF THE DAYS
GAD7 TOTAL SCORE: 9
5. BEING SO RESTLESS THAT IT IS HARD TO SIT STILL: NOT AT ALL
2. NOT BEING ABLE TO STOP OR CONTROL WORRYING: SEVERAL DAYS
6. BECOMING EASILY ANNOYED OR IRRITABLE: MORE THAN HALF THE DAYS
3. WORRYING TOO MUCH ABOUT DIFFERENT THINGS: MORE THAN HALF THE DAYS
7. FEELING AFRAID AS IF SOMETHING AWFUL MIGHT HAPPEN: SEVERAL DAYS
GAD7 TOTAL SCORE: 9
4. TROUBLE RELAXING: SEVERAL DAYS

## 2023-12-24 ASSESSMENT — ANXIETY QUESTIONNAIRES: GAD7 TOTAL SCORE: 9

## 2023-12-26 ENCOUNTER — MYC MEDICAL ADVICE (OUTPATIENT)
Dept: INTERNAL MEDICINE | Facility: CLINIC | Age: 28
End: 2023-12-26

## 2023-12-28 RX ORDER — BUPROPION HYDROCHLORIDE 300 MG/1
300 TABLET ORAL EVERY MORNING
Qty: 30 TABLET | Refills: 2 | Status: SHIPPED | OUTPATIENT
Start: 2023-12-28 | End: 2024-04-02 | Stop reason: SINTOL

## 2024-04-02 ENCOUNTER — OFFICE VISIT (OUTPATIENT)
Dept: INTERNAL MEDICINE | Facility: CLINIC | Age: 29
End: 2024-04-02
Payer: COMMERCIAL

## 2024-04-02 VITALS
BODY MASS INDEX: 35.94 KG/M2 | SYSTOLIC BLOOD PRESSURE: 137 MMHG | DIASTOLIC BLOOD PRESSURE: 85 MMHG | WEIGHT: 210.5 LBS | TEMPERATURE: 97.9 F | HEIGHT: 64 IN | OXYGEN SATURATION: 97 % | HEART RATE: 77 BPM

## 2024-04-02 DIAGNOSIS — E66.01 CLASS 2 SEVERE OBESITY DUE TO EXCESS CALORIES WITH SERIOUS COMORBIDITY AND BODY MASS INDEX (BMI) OF 36.0 TO 36.9 IN ADULT (H): ICD-10-CM

## 2024-04-02 DIAGNOSIS — F41.1 GENERALIZED ANXIETY DISORDER: Primary | ICD-10-CM

## 2024-04-02 DIAGNOSIS — E66.812 CLASS 2 SEVERE OBESITY DUE TO EXCESS CALORIES WITH SERIOUS COMORBIDITY AND BODY MASS INDEX (BMI) OF 36.0 TO 36.9 IN ADULT (H): ICD-10-CM

## 2024-04-02 DIAGNOSIS — F33.2 SEVERE EPISODE OF RECURRENT MAJOR DEPRESSIVE DISORDER, WITHOUT PSYCHOTIC FEATURES (H): ICD-10-CM

## 2024-04-02 DIAGNOSIS — F41.0 PANIC ATTACK: ICD-10-CM

## 2024-04-02 DIAGNOSIS — E78.2 MIXED HYPERLIPIDEMIA: ICD-10-CM

## 2024-04-02 PROCEDURE — 99214 OFFICE O/P EST MOD 30 MIN: CPT | Performed by: INTERNAL MEDICINE

## 2024-04-02 RX ORDER — BUSPIRONE HYDROCHLORIDE 10 MG/1
10 TABLET ORAL 2 TIMES DAILY PRN
Qty: 60 TABLET | Refills: 0 | Status: SHIPPED | OUTPATIENT
Start: 2024-04-02 | End: 2024-08-21

## 2024-04-02 RX ORDER — SERTRALINE HYDROCHLORIDE 100 MG/1
150 TABLET, FILM COATED ORAL DAILY
Qty: 135 TABLET | Refills: 1 | Status: SHIPPED | OUTPATIENT
Start: 2024-04-02 | End: 2024-08-21

## 2024-04-02 RX ORDER — ARIPIPRAZOLE 5 MG/1
5 TABLET ORAL DAILY
Qty: 30 TABLET | Refills: 0 | Status: SHIPPED | OUTPATIENT
Start: 2024-04-02 | End: 2024-05-07

## 2024-04-02 ASSESSMENT — ANXIETY QUESTIONNAIRES
GAD7 TOTAL SCORE: 8
GAD7 TOTAL SCORE: 8
6. BECOMING EASILY ANNOYED OR IRRITABLE: MORE THAN HALF THE DAYS
2. NOT BEING ABLE TO STOP OR CONTROL WORRYING: SEVERAL DAYS
8. IF YOU CHECKED OFF ANY PROBLEMS, HOW DIFFICULT HAVE THESE MADE IT FOR YOU TO DO YOUR WORK, TAKE CARE OF THINGS AT HOME, OR GET ALONG WITH OTHER PEOPLE?: SOMEWHAT DIFFICULT
1. FEELING NERVOUS, ANXIOUS, OR ON EDGE: SEVERAL DAYS
4. TROUBLE RELAXING: MORE THAN HALF THE DAYS
5. BEING SO RESTLESS THAT IT IS HARD TO SIT STILL: NOT AT ALL
7. FEELING AFRAID AS IF SOMETHING AWFUL MIGHT HAPPEN: SEVERAL DAYS
GAD7 TOTAL SCORE: 8
3. WORRYING TOO MUCH ABOUT DIFFERENT THINGS: SEVERAL DAYS
IF YOU CHECKED OFF ANY PROBLEMS ON THIS QUESTIONNAIRE, HOW DIFFICULT HAVE THESE PROBLEMS MADE IT FOR YOU TO DO YOUR WORK, TAKE CARE OF THINGS AT HOME, OR GET ALONG WITH OTHER PEOPLE: SOMEWHAT DIFFICULT
7. FEELING AFRAID AS IF SOMETHING AWFUL MIGHT HAPPEN: SEVERAL DAYS

## 2024-04-02 ASSESSMENT — PATIENT HEALTH QUESTIONNAIRE - PHQ9
SUM OF ALL RESPONSES TO PHQ QUESTIONS 1-9: 10
SUM OF ALL RESPONSES TO PHQ QUESTIONS 1-9: 10
10. IF YOU CHECKED OFF ANY PROBLEMS, HOW DIFFICULT HAVE THESE PROBLEMS MADE IT FOR YOU TO DO YOUR WORK, TAKE CARE OF THINGS AT HOME, OR GET ALONG WITH OTHER PEOPLE: SOMEWHAT DIFFICULT

## 2024-04-28 ENCOUNTER — MYC MEDICAL ADVICE (OUTPATIENT)
Dept: INTERNAL MEDICINE | Facility: CLINIC | Age: 29
End: 2024-04-28
Payer: COMMERCIAL

## 2024-05-07 DIAGNOSIS — F33.2 SEVERE EPISODE OF RECURRENT MAJOR DEPRESSIVE DISORDER, WITHOUT PSYCHOTIC FEATURES (H): ICD-10-CM

## 2024-05-07 RX ORDER — ARIPIPRAZOLE 5 MG/1
5 TABLET ORAL DAILY
Qty: 30 TABLET | Refills: 0 | Status: SHIPPED | OUTPATIENT
Start: 2024-05-07 | End: 2024-05-21 | Stop reason: SINTOL

## 2024-05-07 NOTE — TELEPHONE ENCOUNTER
I will forward this request to dr Ferguson who prescribed it. Patient has a f/up debbi with dr Ferguson on May 21, 2024

## 2024-05-18 SDOH — HEALTH STABILITY: PHYSICAL HEALTH: ON AVERAGE, HOW MANY MINUTES DO YOU ENGAGE IN EXERCISE AT THIS LEVEL?: 30 MIN

## 2024-05-18 SDOH — HEALTH STABILITY: PHYSICAL HEALTH: ON AVERAGE, HOW MANY DAYS PER WEEK DO YOU ENGAGE IN MODERATE TO STRENUOUS EXERCISE (LIKE A BRISK WALK)?: 3 DAYS

## 2024-05-18 ASSESSMENT — SOCIAL DETERMINANTS OF HEALTH (SDOH): HOW OFTEN DO YOU GET TOGETHER WITH FRIENDS OR RELATIVES?: TWICE A WEEK

## 2024-05-18 NOTE — COMMUNITY RESOURCES LIST (ENGLISH)
May 18, 2024           YOUR PERSONALIZED LIST OF SERVICES & PROGRAMS               Medical Transportation, (NEMT)      [facility] Nurse Program - Transportation to medical appointments  [address] [place] [address] (Distance: 8.4 miles)  Phone: [phone]  Website: [email or web address]  Language: English, Azeri, [place] Islander  Fee: Sliding scale  Accessibility: Translation services      - [place] - Non-Emergency Medical Transportation  [address] [place] [address] (Distance: 6.2 miles)  Phone: [phone]  Website: [email or web address]://[email or web address]/  Language: English, Azeri, [place] Islander  Fee: Insurance  Accessibility: Ada accessible      Social [facility] of [place] - Neighbor to Neighbor Program  Phone: [phone]  Email: [email or web address]  Website: https://www.[facility].org/services/older-adults/-services/neighbor-to-neighbor  Language: English  Hours: Mon 8:00 AM - 5:00 PM Tue 8:00 AM - 5:00 PM Wed 8:00 AM - 5:00 PM Thu 8:00 AM - 5:00 PM Fri 8:00 AM - 5:00 PM  Fee: Insurance, Self pay  Accessibility: Deaf or hard of hearing, Blind accommodation, Translation services    Expense Assistance      Transit - [place] - Transit Assistance Program (TAP) - Transportation expense assistance  [address] [place] [address] (Distance: 11.0 miles)  Language: English, Azeri  Fee: Free, Sliding scale, Self pay  Accessibility: Translation services      School - Wheels for Women  [address] [place] [address] (Distance: 10.5 miles)  Phone: [phone]  Website: http://www.RxApps.org  Language: English  Fee: Free  Accessibility: Translation services  Transportation Options: Free transportation      - Dislocated Worker/Adult [employer] Employment Program  Phone: [phone] [phone]  Email: [email or web address]  Website: https://[employer]n.org/services/employment-services/dislocated-worker-program/  Language:  English, Bangladeshi  Hours: Mon 8:00 AM - 4:30 PM Tue 8:00 AM - 4:30 PM Wed 8:00 AM - 4:30 PM Thu 8:00 AM - 4:30 PM Fri 8:00 AM - 4:30 PM  Fee: Free  Accessibility: Ada accessible    Coordination      [facility] - Bus Passes - Free or low-cost transportation  [address] [place] [address] (Distance: 9.0 miles)  Phone: [phone]  Language: English  Fee: Free  Accessibility: Deaf or hard of hearing, Ada accessible      Transportation - Ride coordination  [address] [place] [address] (Distance: 9.7 miles)  Website: https://[employer]  Language: English  Fee: Self pay, Insurance  Accessibility: Ada accessible      - [place] - [employer] - [place]  Phone: [phone]  Website: http://CME               IMPORTANT NUMBERS & WEBSITES        Emergency Services  911  .   [facility]  211 [email or web address]  .   Poison Control  (639) 790-1099 http://[place]poison.org http://wis[place]poison.org  .     Suicide and Crisis Lifeline  988 http://988lifeline.org  .   Childhelp [place] Child Abuse Hotline  785.670.9514 http://Childhelphotline.org   .   [place] Sexual Assault Hotline  (108) 597-2030 (HOPE) http://Primus Powern.org   .     National Runaway Safeline  (595) 347-2147 (RUNAWAY) http://BookyaruWorkspot.org  .   Pregnancy & Postpartum Support  Call/text 592-630-5230  [place]: http://ppsupportmn.org  [place]: http://Rowbot Systems.com/wi  .   Substance Abuse National Helpline ([facility])  393-376-HELP (8939) http://Findtreatment.gov   .                DISCLAIMER: These resources have been generated via the [employer] Platform. [employer] does not endorse any service providers mentioned in this resource list. [employer] does not guarantee that the services mentioned in this resource list will be available to you or will improve your health or wellness.    [facility]

## 2024-05-20 NOTE — PROGRESS NOTES
"Please see \"Imaging\" tab under \"Chart Review\" for details of today's US.    Beth Fisher, DO    " English

## 2024-05-21 ENCOUNTER — OFFICE VISIT (OUTPATIENT)
Dept: INTERNAL MEDICINE | Facility: CLINIC | Age: 29
End: 2024-05-21
Payer: COMMERCIAL

## 2024-05-21 VITALS
HEART RATE: 72 BPM | HEIGHT: 64 IN | SYSTOLIC BLOOD PRESSURE: 113 MMHG | DIASTOLIC BLOOD PRESSURE: 79 MMHG | TEMPERATURE: 97.9 F | BODY MASS INDEX: 36.04 KG/M2 | OXYGEN SATURATION: 98 % | WEIGHT: 211.1 LBS

## 2024-05-21 DIAGNOSIS — F33.2 SEVERE EPISODE OF RECURRENT MAJOR DEPRESSIVE DISORDER, WITHOUT PSYCHOTIC FEATURES (H): Primary | ICD-10-CM

## 2024-05-21 DIAGNOSIS — F41.1 GENERALIZED ANXIETY DISORDER: ICD-10-CM

## 2024-05-21 DIAGNOSIS — E78.2 MIXED HYPERLIPIDEMIA: ICD-10-CM

## 2024-05-21 PROCEDURE — 99214 OFFICE O/P EST MOD 30 MIN: CPT | Performed by: INTERNAL MEDICINE

## 2024-05-21 RX ORDER — BUPROPION HYDROCHLORIDE 150 MG/1
150 TABLET ORAL EVERY MORNING
Qty: 90 TABLET | Refills: 1 | Status: SHIPPED | OUTPATIENT
Start: 2024-05-21 | End: 2024-08-21

## 2024-05-21 ASSESSMENT — PATIENT HEALTH QUESTIONNAIRE - PHQ9: SUM OF ALL RESPONSES TO PHQ QUESTIONS 1-9: 14

## 2024-05-21 NOTE — COMMUNITY RESOURCES LIST (ENGLISH)
May 21, 2024           YOUR PERSONALIZED LIST OF SERVICES & PROGRAMS               Medical Transportation, (NEMT)      [facility] Nurse Program - Transportation to medical appointments  [address] [place] [address] (Distance: 8.4 miles)  Phone: [phone]  Website: [email or web address]  Language: English, Khmer, Iraqi  Fee: Sliding scale  Accessibility: Translation services      - [place] - Non-Emergency Medical Transportation  [address] [place] [address] (Distance: 6.2 miles)  Phone: [phone]  Website: http://www.[email or web address].net/[place]/  Language: English, Khmer, Iraqi  Fee: Insurance  Accessibility: Ada accessible      Social Service of [place] - Neighbor to Neighbor Program  Phone: [phone]  Email: [email or web address]  Website: https://www.[facility].org/services/older-adults/-services/neighbor-to-neighbor  Language: English  Hours: Mon 8:00 AM - 5:00 PM Tue 8:00 AM - 5:00 PM Wed 8:00 AM - 5:00 PM Thu 8:00 AM - 5:00 PM Fri 8:00 AM - 5:00 PM  Fee: Insurance, Self pay  Accessibility: Deaf or hard of hearing, Blind accommodation, Translation services    Expense Assistance      Transit - [place] - Transit Assistance Program (TAP) - Transportation expense assistance  [address] [place] [address] (Distance: 11.0 miles)  Language: English, Khmer  Fee: Free, Sliding scale, Self pay  Accessibility: Translation services      School - Wheels for Women  [address] [place] [address] (Distance: 10.5 miles)  Phone: [phone]  Website: http://www.Eventstagr.am.org  Language: English  Fee: Free  Accessibility: Translation services  Transportation Options: Free transportation      - Dislocated Worker/Adult [employer] Employment Program  Phone: [phone]  Email: [email or web address]@[email or web address].org  Website: https://StackIQmn.org/services/employment-services/dislocated-worker-program/  Language:  English, Cymraes  Hours: Mon 8:00 AM - 4:30 PM Tue 8:00 AM - 4:30 PM Wed 8:00 AM - 4:30 PM Thu 8:00 AM - 4:30 PM Fri 8:00 AM - 4:30 PM  Fee: Free  Accessibility: Ada accessible    Coordination      Mobility - Paratransit or Dial-A-Ride service  [address] [place] [facility] [address] (Distance: 11.1 miles)  Phone: [phone]  Website: http://[employer].Trans [employer]man Resources  Language: English  Fee: Self pay  Accessibility: Translation services, Ada accessible      Transit - [place] - Transit Link  [address] [place] [address] (Distance: 11.0 miles)  Language: English  Fee: Self pay  Accessibility: Translation services      - [place] - [facility] - [place]  Phone: [phone]  Website: http://AVdirect               IMPORTANT NUMBERS & WEBSITES        Emergency Services  911  .   [facility]  211 [email or web address]  .   Poison Control  (450) 871-6430 http://mnpoison.org http://wisconsinpoison.org  .     Suicide and Crisis Lifeline  988 http://988lifeline.org  .   Childhelp [place] Child Abuse Hotline  581.426.1519 http://Childhelphotline.org   .   [place] Sexual Assault Hotline  (603) 104-9041 (HOPE) http://SportsBoardn.org   .     [place] Runaway Safeline  (684) 642-8731 (RUNAWAY) http://Topsy LabsrunaDiagn[employer] Imaging International.org  .   Pregnancy & Postpartum Support  Call/text [phone]  [place]: http://ppsupportmn.org  [place]: http://[employer]star [place].com/wi  .   Substance Abuse National Helpline ([facility])  880-531-HELP (9317) http://Findtreatment.gov   .                DISCLAIMER: These resources have been generated via the One[employer] Platform. [employer] does not endorse any service providers mentioned in this resource list. [employer] does not guarantee that the services mentioned in this resource list will be available to you or will improve your health or wellness.    [facility]

## 2024-05-21 NOTE — PROGRESS NOTES
Assessment & Plan     Severe episode of recurrent major depressive disorder, without psychotic features (H)  Generalized anxiety disorder  Restart Wellbutrin at 150 mg a day  Continue Zoloft  BuSpar as needed for anxiety  Referrals placed for psychology and psychiatry    Mixed hyperlipidemia  Check lipid panel      Patient has been advised of split billing requirements and indicates understanding: Yes        Counseling  Appropriate preventive services were discussed with this patient, including applicable screening as appropriate for fall prevention, nutrition, physical activity, Tobacco-use cessation, weight loss and cognition.  Checklist reviewing preventive services available has been given to the patient.  Reviewed patient's diet, addressing concerns and/or questions.   She is at risk for lack of exercise and has been provided with information to increase physical activity for the benefit of her well-being.   The patient was instructed to see the dentist every 6 months.   The patient's PHQ-9 score is consistent with moderate depression. She was provided with information regarding depression.       Follow-up for annual physical in July.    Mackenzie Whitney is a 29 year old, presenting for the following health issues:  Recheck Medication  Aripiprazole- overly emotional, head aches- was not helping as much to subside the depression    Wants to switch back to Buspirone     HPI     Patient was unable to tolerate Abilify, states it made her more emotional.  She is tolerating Zoloft well    Wants  to go back to Wellbutrin at a dose of 150 mg a day as she can tolerate that.    Requesting referral for psychology and psychiatry.      Review of Systems  Constitutional, neuro, ENT, endocrine, pulmonary, cardiac, gastrointestinal, genitourinary, musculoskeletal, integument and psychiatric systems are negative, except as otherwise noted.      Objective    /79   Pulse 72   Temp 97.9  F (36.6  C) (Temporal)   Ht  "1.626 m (5' 4\")   Wt 95.8 kg (211 lb 1.6 oz)   LMP 05/21/2024 (Approximate)   SpO2 98%   BMI 36.24 kg/m    Body mass index is 36.24 kg/m .  Physical Exam   GENERAL: alert and no distress  NECK: no adenopathy, no asymmetry, masses, or scars  RESP: lungs clear to auscultation - no rales, rhonchi or wheezes  CV: regular rate and rhythm, normal S1 S2, no S3 or S4, no murmur, click or rub, no peripheral edema  ABDOMEN: soft, nontender, no hepatosplenomegaly, no masses and bowel sounds normal  MS: no gross musculoskeletal defects noted, no edema            Signed Electronically by: Jimmie Ferguson MD    "

## 2024-05-21 NOTE — PROGRESS NOTES
Preventive Care Visit  [facility], Internal [facility]  May 21, 2024  {Provider  Link to SmartSet :714649}    {PROVIDER CHARTING PREFERENCE:880270}    Mackenzie Whitney is a 29 year old, presenting for the following:  Physical         Health Care Directive  Patient does not have a Health Care Directive or Living Will: {ADVANCE_DIRECTIVE_STATUS:153377}    HPI  ***  {MA/LPN/RN Pre-Provider Visit Orders- hCG/UA/Strep (Optional):725481}  {SUPERLIST (Optional):495526}  {additonal problems for provider to add (Optional):650405}      5/18/2024   General Health   How would you rate your overall physical health? (!) FAIR   Feel stress (tense, anxious, or unable to sleep) Rather much   (!) STRESS CONCERN      5/18/2024   Nutrition   Three or more servings of calcium each day? (!) NO   Diet: Regular (no restrictions)   How many servings of fruit and vegetables per day? (!) 0-1   How many sweetened beverages each day? 0-1         5/18/2024   Exercise   Days per week of moderate/strenous exercise 3 days   Average minutes spent exercising at this level 30 min         5/18/2024   Social Factors   Frequency of gathering with friends or relatives Twice a week   Worry food won't last until get money to buy more No   Food not last or not have enough money for food? No   Do you have housing?  Yes   Are you worried about losing your housing? No   Lack of transportation? Yes   Unable to get utilities (heat,electricity)? No    (!) TRANSPORTATION CONCERN PRESENT      5/18/2024   Dental   Dentist two times every year? (!) NO         5/18/2024   TB Screening   Were you born outside of the [place]? No       { Rooming Staff Patient needs a PHQ as part of the AWV.  Use this link to complete and then refresh the note to pull results Link to PHQ9 Assessment :707348}  {USE TO PULL IN PHQ RESULTS FOR TODAY:162243}        5/18/2024   Substance Use   Alcohol more than 3/day or more than 7/wk No   Do you use  any other substances recreationally? (!) CANNABIS PRODUCTS     Social History     Tobacco Use    Smoking status: Former     Types: Cigarettes     Passive exposure: Never    Smokeless tobacco: Never   Vaping Use    Vaping status: Never Used   Substance Use Topics    Alcohol use: Yes     Comment: Socially, once a week    Drug use: Yes     Types: Marijuana     Comment: Occasional use; past use of hallucinogens (not since 2019).     {Provider  If there are gaps in the social history shown above, please follow the link to update and then refresh the note Link to Social and Substance History :654406}      7/28/2023   LAST FHS-7 RESULTS   1st degree relative breast or ovarian cancer Yes   Any relative bilateral breast cancer Unknown   Any male have breast cancer No   Any ONE woman have BOTH breast AND ovarian cancer Unknown   Any woman with breast cancer before 50yrs Yes   2 or more relatives with breast AND/OR ovarian cancer Yes   2 or more relatives with breast AND/OR bowel cancer No     {If any of the questions to the FHS7 are answered yes, consider referral for genetic counseling.    Additional indications for genetic referral include personal history of breast or ovarian cancer, genetic mutation in 1st degree relative which increases risk of breast cancer including BRCA1, BRCA2, ARIE, PALB 2, TP53, CHEK2, PTEN, CDH1, STK11 (per ACS) and/or 1st degree relative with history of pancreatic or high-risk prostate cancer (per NCCN):237540}   {Mammogram Decision Support (Optional):772873}        5/18/2024   STI Screening   New sexual partner(s) since last STI/HIV test? No     History of abnormal Pap smear: { :269861}        Latest Ref Rng & Units 8/3/2023     7:32 AM 4/18/2019    10:56 AM 4/18/2019     8:34 AM   PAP / HPV   PAP  Negative for Intraepithelial Lesion or Malignancy (NILM)      PAP (Historical)    NIL    HPV 16 DNA NEG^Negative  Negative     HPV 18 DNA NEG^Negative  Negative     Other HR HPV NEG^Negative  Negative  "            5/18/2024   Contraception/Family Planning   Questions about contraception or family planning No     {Provider  Use the storyboard to review patient history, after sections have been marked as reviewed, refresh note to capture documentation:111911}   Reviewed and updated as needed this visit by Provider                    {HISTORY OPTIONS (Optional):464864}    {ROS Picklists (Optional):397421}     Objective    Exam  LMP 03/19/2024 (Approximate)    Estimated body mass index is 36.13 kg/m  as calculated from the following:    Height as of 4/2/24: 1.626 m (5' 4\").    Weight as of 4/2/24: 95.5 kg (210 lb 8 oz).    Physical Exam  {Exam Choices (Optional):809163}        Signed Electronically by: Jimmie Ferguson MD  {Email feedback regarding this note to [email or web address]   :047836}  "

## 2024-08-17 ENCOUNTER — LAB (OUTPATIENT)
Dept: LAB | Facility: CLINIC | Age: 29
End: 2024-08-17
Payer: COMMERCIAL

## 2024-08-17 DIAGNOSIS — E78.2 MIXED HYPERLIPIDEMIA: ICD-10-CM

## 2024-08-17 LAB
CHOLEST SERPL-MCNC: 212 MG/DL
FASTING STATUS PATIENT QL REPORTED: YES
HDLC SERPL-MCNC: 46 MG/DL
LDLC SERPL CALC-MCNC: 142 MG/DL
NONHDLC SERPL-MCNC: 166 MG/DL
TRIGL SERPL-MCNC: 121 MG/DL

## 2024-08-17 PROCEDURE — 36415 COLL VENOUS BLD VENIPUNCTURE: CPT

## 2024-08-17 PROCEDURE — 80061 LIPID PANEL: CPT

## 2024-08-21 ENCOUNTER — OFFICE VISIT (OUTPATIENT)
Dept: INTERNAL MEDICINE | Facility: CLINIC | Age: 29
End: 2024-08-21
Attending: INTERNAL MEDICINE
Payer: COMMERCIAL

## 2024-08-21 VITALS
BODY MASS INDEX: 36.88 KG/M2 | DIASTOLIC BLOOD PRESSURE: 90 MMHG | TEMPERATURE: 97.5 F | HEART RATE: 81 BPM | WEIGHT: 216 LBS | OXYGEN SATURATION: 99 % | HEIGHT: 64 IN | SYSTOLIC BLOOD PRESSURE: 139 MMHG

## 2024-08-21 DIAGNOSIS — E66.01 CLASS 2 SEVERE OBESITY DUE TO EXCESS CALORIES WITH SERIOUS COMORBIDITY AND BODY MASS INDEX (BMI) OF 36.0 TO 36.9 IN ADULT (H): ICD-10-CM

## 2024-08-21 DIAGNOSIS — E66.812 CLASS 2 SEVERE OBESITY DUE TO EXCESS CALORIES WITH SERIOUS COMORBIDITY AND BODY MASS INDEX (BMI) OF 36.0 TO 36.9 IN ADULT (H): ICD-10-CM

## 2024-08-21 DIAGNOSIS — F51.04 PSYCHOPHYSIOLOGICAL INSOMNIA: ICD-10-CM

## 2024-08-21 DIAGNOSIS — E78.2 MIXED HYPERLIPIDEMIA: ICD-10-CM

## 2024-08-21 DIAGNOSIS — F41.1 GENERALIZED ANXIETY DISORDER: ICD-10-CM

## 2024-08-21 DIAGNOSIS — E55.9 VITAMIN D DEFICIENCY: ICD-10-CM

## 2024-08-21 DIAGNOSIS — F33.2 SEVERE EPISODE OF RECURRENT MAJOR DEPRESSIVE DISORDER, WITHOUT PSYCHOTIC FEATURES (H): ICD-10-CM

## 2024-08-21 DIAGNOSIS — Z00.00 ROUTINE GENERAL MEDICAL EXAMINATION AT A HEALTH CARE FACILITY: Primary | ICD-10-CM

## 2024-08-21 PROCEDURE — 99395 PREV VISIT EST AGE 18-39: CPT | Performed by: INTERNAL MEDICINE

## 2024-08-21 RX ORDER — VITAMIN K2 90 MCG
CAPSULE ORAL
COMMUNITY
Start: 2024-08-21

## 2024-08-21 RX ORDER — BUPROPION HYDROCHLORIDE 150 MG/1
150 TABLET ORAL EVERY MORNING
Qty: 90 TABLET | Refills: 3 | Status: SHIPPED | OUTPATIENT
Start: 2024-08-21

## 2024-08-21 RX ORDER — SERTRALINE HYDROCHLORIDE 100 MG/1
150 TABLET, FILM COATED ORAL DAILY
Qty: 135 TABLET | Refills: 3 | Status: SHIPPED | OUTPATIENT
Start: 2024-08-21

## 2024-08-21 SDOH — HEALTH STABILITY: PHYSICAL HEALTH: ON AVERAGE, HOW MANY MINUTES DO YOU ENGAGE IN EXERCISE AT THIS LEVEL?: 30 MIN

## 2024-08-21 SDOH — HEALTH STABILITY: PHYSICAL HEALTH: ON AVERAGE, HOW MANY DAYS PER WEEK DO YOU ENGAGE IN MODERATE TO STRENUOUS EXERCISE (LIKE A BRISK WALK)?: 1 DAY

## 2024-08-21 ASSESSMENT — SOCIAL DETERMINANTS OF HEALTH (SDOH): HOW OFTEN DO YOU GET TOGETHER WITH FRIENDS OR RELATIVES?: THREE TIMES A WEEK

## 2024-08-21 ASSESSMENT — PATIENT HEALTH QUESTIONNAIRE - PHQ9: SUM OF ALL RESPONSES TO PHQ QUESTIONS 1-9: 6

## 2024-08-21 NOTE — PROGRESS NOTES
Preventive Care Visit  [facility]  Jimmie Ferguson MD, Internal Medicine  Aug 21, 2024      Assessment & Plan     Routine general medical examination at a health care facility    Vitamin D deficiency  Take OTC supplements    Mixed hyperlipidemia  Improving on recent lab with diet control    Class 2 severe obesity due to excess calories with serious comorbidity and body mass index (BMI) of 36.0 to 36.9 in adult (H)      Severe episode of recurrent major depressive disorder, without psychotic features (H)  Generalized anxiety disorder  Continue current medications      Psychophysiological insomnia  Takes Marijuana as needed.      Patient has been advised of split billing requirements and indicates understanding: Yes        Counseling  Appropriate preventive services were addressed with this patient via screening, questionnaire, or discussion as appropriate for fall prevention, nutrition, physical activity, Tobacco-use cessation, social engagement, weight loss and cognition.  Checklist reviewing preventive services available has been given to the patient.  Reviewed patient's diet, addressing concerns and/or questions.   She is at risk for lack of exercise and has been provided with information to increase physical activity for the benefit of her well-being.   The patient was instructed to see the dentist every 6 months.   She is at risk for psychosocial distress and has been provided with information to reduce risk.   The patient's PHQ-9 score is consistent with mild depression. She was provided with information regarding depression.       Follow up in 1 year for Annual Physical.      Mackenzie Whitney is a 29 year old, presenting for the following:  Physical  Supplements   Methyl folate 1000mcg   Sea flynn black seed oil [facility]ul[facility]as[facility] root [id number]         Health Care Directive  Patient does not have a Health Care Directive or Living Will:     HPI       She is doing better with  depression.  Medications are working better.    Appointment with Psychiatry is not till Feb 2025.          8/21/2024   General Health   How would you rate your overall physical health? (!) POOR   Feel stress (tense, anxious, or unable to sleep) Only a little      (!) STRESS CONCERN      8/21/2024   Nutrition   Three or more servings of calcium each day? (!) NO   Diet: Regular (no restrictions)   How many servings of fruit and vegetables per day? (!) 0-1   How many sweetened beverages each day? 0-1            8/21/2024   Exercise   Days per week of moderate/strenous exercise 1 day   Average minutes spent exercising at this level 30 min      (!) EXERCISE CONCERN      8/21/2024   Social Factors   Frequency of gathering with friends or relatives Three times a week   Worry food won't last until get money to buy more No   Food not last or not have enough money for food? No   Do you have housing? (Housing is defined as stable permanent housing and does not include staying ouside in a car, in a tent, in an abandoned building, in an overnight shelter, or couch-surfing.) No   Are you worried about losing your housing? No   Lack of transportation? Yes   Unable to get utilities (heat,electricity)? No   Want help with housing or utility concern? No       (!) TRANSPORTATION CONCERN PRESENT(!) HOUSING CONCERN PRESENT      8/21/2024   Dental   Dentist two times every year? (!) NO            5/18/2024   TB Screening   Were you born outside of the [place]? No            Today's PHQ-9 Score:       5/21/2024    12:57 PM   PHQ-9 SCORE   PHQ-9 Total Score 14           8/21/2024   Substance Use   Alcohol more than 3/day or more than 7/wk No   Do you use any other substances recreationally? (!) CANNABIS PRODUCTS        Social History     Tobacco Use    Smoking status: Former     Types: Cigarettes     Passive exposure: Never    Smokeless tobacco: Never   Vaping Use    Vaping status: Never Used   Substance Use Topics    Alcohol use: Yes      "Comment: Socially, once a week    Drug use: Yes     Types: Marijuana     Comment: Occasional use; past use of hallucinogens (not since 2019).           7/28/2023   LAST FHS-7 RESULTS   1st degree relative breast or ovarian cancer Yes   Any relative bilateral breast cancer Unknown   Any male have breast cancer No   Any ONE woman have BOTH breast AND ovarian cancer Unknown   Any woman with breast cancer before 50yrs Yes   2 or more relatives with breast AND/OR ovarian cancer Yes   2 or more relatives with breast AND/OR bowel cancer No           Mammogram Screening - Patient under 40 years of age: Routine Mammogram Screening not recommended.         8/21/2024   STI Screening   New sexual partner(s) since last STI/HIV test? No        History of abnormal Pap smear: No - age 30-64 HPV with reflex Pap every 5 years recommended        Latest Ref Rng & Units 8/3/2023     7:32 AM 4/18/2019    10:56 AM 4/18/2019     8:34 AM   PAP / HPV   PAP  Negative for Intraepithelial Lesion or Malignancy (NILM)      PAP (Historical)    NIL    HPV 16 DNA NEG^Negative  Negative     HPV 18 DNA NEG^Negative  Negative     Other HR HPV NEG^Negative  Negative             8/21/2024   Contraception/Family Planning   Questions about contraception or family planning No           Reviewed and updated as needed this visit by Provider                    Past Medical History:   Diagnosis Date    Chronic midline low back pain without sciatica 7/24/2017    Depressive disorder April 2017    Overweight 7/24/2017    Proteinuria     small protein on screening UA - recheck         Review of Systems  Constitutional, HEENT, cardiovascular, pulmonary, GI, , musculoskeletal, neuro, skin, endocrine and psych systems are negative, except as otherwise noted.     Objective    Exam  BP (!) 139/90   Pulse 81   Temp 97.5  F (36.4  C) (Temporal)   Ht 1.626 m (5' 4\")   Wt 98 kg (216 lb)   LMP 08/16/2024 (Approximate)   SpO2 99%   BMI 37.08 kg/m     Estimated body " "mass index is 37.08 kg/m  as calculated from the following:    Height as of this encounter: 1.626 m (5' 4\").    Weight as of this encounter: 98 kg (216 lb).  Repeat BP: 126/88.  Physical Exam  GENERAL: alert and no distress  EYES: Eyes grossly normal to inspection, PERRL and conjunctivae and sclerae normal  HENT: ear canals and TM's normal, nose and mouth without ulcers or lesions  NECK: no adenopathy, no asymmetry, masses, or scars  RESP: lungs clear to auscultation - no rales, rhonchi or wheezes  CV: regular rate and rhythm, normal S1 S2, no S3 or S4, no murmur, click or rub, no peripheral edema  ABDOMEN: soft, nontender, no hepatosplenomegaly, no masses and bowel sounds normal  MS: no gross musculoskeletal defects noted, no edema  SKIN: no suspicious lesions or rashes  NEURO: Normal strength and tone, mentation intact and speech normal  PSYCH: mentation appears normal, affect normal/bright        Signed Electronically by: Jimmie Ferguson MD    "

## 2024-08-21 NOTE — PATIENT INSTRUCTIONS
Patient Education   Preventive Care Advice   This is general advice given by our system to help you stay healthy. However, your care team may have specific advice just for you. Please talk to your care team about your preventive care needs.  Nutrition  Eat 5 or more servings of fruits and vegetables each day.  Try wheat bread, brown rice and whole grain pasta (instead of white bread, rice, and pasta).  Get enough calcium and vitamin D. Check the label on foods and aim for 100% of the RDA (recommended daily allowance).  Lifestyle  Exercise at least 150 minutes each week  (30 minutes a day, 5 days a week).  Do muscle strengthening activities 2 days a week. These help control your weight and prevent disease.  No smoking.  Wear sunscreen to prevent skin cancer.  Have a dental exam and cleaning every 6 months.  Yearly exams  See your health care team every year to talk about:  Any changes in your health.  Any medicines your care team has prescribed.  Preventive care, family planning, and ways to prevent chronic diseases.  Shots (vaccines)   HPV shots (up to age 26), if you've never had them before.  Hepatitis B shots (up to age 59), if you've never had them before.  COVID-19 shot: Get this shot when it's due.  Flu shot: Get a flu shot every year.  Tetanus shot: Get a tetanus shot every 10 years.  Pneumococcal, hepatitis A, and RSV shots: Ask your care team if you need these based on your risk.  Shingles shot (for age 50 and up)  General health tests  Diabetes screening:  Starting at age 35, Get screened for diabetes at least every 3 years.  If you are younger than age 35, ask your care team if you should be screened for diabetes.  Cholesterol test: At age 39, start having a cholesterol test every 5 years, or more often if advised.  Bone density scan (DEXA): At age 50, ask your care team if you should have this scan for osteoporosis (brittle bones).  Hepatitis C: Get tested at least once in your life.  STIs (sexually  transmitted infections)  Before age 24: Ask your care team if you should be screened for STIs.  After age 24: Get screened for STIs if you're at risk. You are at risk for STIs (including HIV) if:  You are sexually active with more than one person.  You don't use condoms every time.  You or a partner was diagnosed with a sexually transmitted infection.  If you are at risk for HIV, ask about PrEP medicine to prevent HIV.  Get tested for HIV at least once in your life, whether you are at risk for HIV or not.  Cancer screening tests  Cervical cancer screening: If you have a cervix, begin getting regular cervical cancer screening tests starting at age 21.  Breast cancer scan (mammogram): If you've ever had breasts, begin having regular mammograms starting at age 40. This is a scan to check for breast cancer.  Colon cancer screening: It is important to start screening for colon cancer at age 45.  Have a colonoscopy test every 10 years (or more often if you're at risk) Or, ask your provider about stool tests like a FIT test every year or Cologuard test every 3 years.  To learn more about your testing options, visit:   .  For help making a decision, visit:   https://bit.ly/sn60783.  Prostate cancer screening test: If you have a prostate, ask your care team if a prostate cancer screening test (PSA) at age 55 is right for you.  Lung cancer screening: If you are a current or former smoker ages 50 to 80, ask your care team if ongoing lung cancer screenings are right for you.  For informational purposes only. Not to replace the advice of your health care provider. Copyright   2023 [facility]. All rights reserved. Clinically reviewed by the [facility]. [employer] [id number] - REV 01/24.  Substance Use Disorder: Care Instructions  Overview     You can improve your life and health by stopping your use of alcohol or drugs. When you don't drink or use drugs, you may feel and sleep better. You may  get along better with your family, friends, and coworkers. There are medicines and programs that can help with substance use disorder.  How can you care for yourself at home?  Here are some ways to help you stay sober and prevent relapse.  If you have been given medicine to help keep you sober or reduce your cravings, be sure to take it exactly as prescribed.  Talk to your doctor about programs that can help you stop using drugs or drinking alcohol.  Do not keep alcohol or drugs in your home.  Plan ahead. Think about what you'll say if other people ask you to drink or use drugs. Try not to spend time with people who drink or use drugs.  Use the time and money spent on drinking or drugs to do something that's important to you.  Preventing a relapse  Have a plan to deal with relapse. Learn to recognize changes in your thinking that lead you to drink or use drugs. Get help before you start to drink or use drugs again.  Try to stay away from situations, friends, or places that may lead you to drink or use drugs.  If you feel the need to drink alcohol or use drugs again, seek help right away. Call a trusted friend or family member. Some people get support from organizations such as Narcotics Anonymous or [employer] or from treatment facilities.  If you relapse, get help as soon as you can. Some people make a plan with another person that outlines what they want that person to do for them if they relapse. The plan usually includes how to handle the relapse and who to notify in case of relapse.  Don't give up. Remember that a relapse doesn't mean that you have failed. Use the experience to learn the triggers that lead you to drink or use drugs. Then quit again. Recovery is a lifelong process. Many people have several relapses before they are able to quit for good.  Follow-up care is a key part of your treatment and safety. Be sure to make and go to all appointments, and call your doctor if you are having problems. It's  "also a good idea to know your test results and keep a list of the medicines you take.  When should you call for help?   Call 911  anytime you think you may need emergency care. For example, call if you or someone else:    Has overdosed or has withdrawal signs. Be sure to tell the emergency workers that you are or someone else is using or trying to quit using drugs. Overdose or withdrawal signs may include:  Losing consciousness.  Seizure.  Seeing or hearing things that aren't there (hallucinations).     Is thinking or talking about suicide or harming others.   Where to get help 24 hours a day, 7 days a week   If you or someone you know talks about suicide, self-harm, a mental health crisis, a substance use crisis, or any other kind of emotional distress, get help right away. You can:    Call the Suicide and Crisis Lifeline at 988.     Call 8-771-563-TALK (1-852.478.1658).     Text HOME to 964126 to access the Crisis Text Line.   Consider saving these numbers in your phone.  Go to [employer]mobile for more information or to chat online.  Call your doctor now or seek immediate medical care if:    You are having withdrawal symptoms. These may include nausea or vomiting, sweating, shakiness, and anxiety.   Watch closely for changes in your health, and be sure to contact your doctor if:    You have a relapse.     You need more help or support to stop.   Where can you learn more?  Go to https://www.[employer].net/patiented  Enter H573 in the search box to learn more about \"Substance Use Disorder: Care Instructions.\"  Current as of: November 15, 2023               Content Version: 14.0    2080-1007 [employer].   Care instructions adapted under license by your healthcare professional. If you have questions about a medical condition or this instruction, always ask your healthcare professional. [employer] disclaims any warranty or liability for your use of this information.         "

## 2024-10-08 NOTE — PROGRESS NOTES
Patient Name:  Kanwal Sauer  YOB: 1948  MRN:  [id number]  Admit Date:  10/7/2024  Patient Care Team:  Nisha Britton MD as PCP - General (Family Medicine)  Corey Britton MD as Consulting Physician (Sleep Medicine)  Belen Khan APRN as Nurse Practitioner (Obstetrics and Gynecology)  Nia Zhang APRN as Nurse Practitioner (Cardiology)  José Valentino MD as Consulting Physician (Urology)  Indio Box MD as Consulting Physician (Pulmonary Disease)      Subjective   History Present Illness     Chief Complaint   Patient presents with   • Back Pain       Ms. Sauer is a 75 y.o. female with a history of COPD, HTN, PAF, diabetes, chronic back pain, breast cancer, seizures, TARSHA that presents to [facility] complaining of worsening of chronic back pain for several weeks to point of severe last 2 days.  Pain is in the middle to lower back, it is intense, aching and sharp.  No numbness, tingling or weakness of the lower or upper extremities.  No loss of bowel and bladder control.  No change in bowel and bladder pattern.  Her pain did not improve in the ED after receiving pain medications.  CT of the spine showed severe changes.  She was admitted to the hospital for further evaluation and treatment.  Cardiology was previously consulted by another provider due to concerns for bradycardia.    No recent changes to medications.  She is taking all prescribed medications.  Diabetes has been under good control recently.    Review of Systems   Constitutional:  Negative for activity change, appetite change, chills, diaphoresis and fatigue.   HENT:  Negative for congestion.    Respiratory:  Negative for cough and shortness of breath.    Cardiovascular:  Negative for chest pain, palpitations and leg swelling.   Gastrointestinal:  Negative for abdominal pain, constipation, diarrhea and nausea.   Genitourinary:  Negative for difficulty urinating and dysuria.  "  Assessment & Plan     Generalized anxiety disorder  Panic attack  Trial of BuSpar  Continue Zoloft    Severe episode of recurrent major depressive disorder, without psychotic features (H)  Added Abilify 5 mg  Continue Zoloft      Mixed hyperlipidemia  Class 2 severe obesity due to excess calories with serious comorbidity and body mass index (BMI) of 36.0 to 36.9 in adult (H)  Diet and exercise            BMI  Estimated body mass index is 36.13 kg/m  as calculated from the following:    Height as of this encounter: 1.626 m (5' 4\").    Weight as of this encounter: 95.5 kg (210 lb 8 oz).   Weight management plan: Discussed healthy diet and exercise guidelines      Follow-up in 4 weeks for a Physical, can do fasting labs then.    Mackenzie Whitney is a 29 year old, presenting for the following health issues:  Establish Care  Wellbutrin- pt wants to switch because med makes her nauseas anything over 150mg.   Has been off the meds for 3 days because pt ran out.   Pt is not really interested in seeing mental health at this point, just wants the medication changed   History of Present Illness       Mental Health Follow-up:  Patient presents to follow-up on Depression & Anxiety.Patient's depression since last visit has been:  Bad  The patient is not having other symptoms associated with depression.  Patient's anxiety since last visit has been:  Bad  The patient is not having other symptoms associated with anxiety.  Any significant life events: job concerns, financial concerns, housing concerns, grief or loss and health concerns  Patient is feeling anxious or having panic attacks.  Patient has no concerns about alcohol or drug use.    She eats 0-1 servings of fruits and vegetables daily.She consumes 0 sweetened beverage(s) daily.She exercises with enough effort to increase her heart rate 30 to 60 minutes per day.  She exercises with enough effort to increase her heart rate 6 days per week. She is missing 2 dose(s) of "   Musculoskeletal:  Positive for back pain.   Skin:  Negative for rash and wound.   Neurological:  Negative for dizziness and light-headedness.        Personal History     Past Medical History:   Diagnosis Date   • Allergic    • Anxiety    • Asthma    • Atrial fibrillation    • Cancer     Breast   • COPD (chronic obstructive pulmonary disease) 09/07/2022   • Deep vein thrombosis    • Diabetes mellitus    • Difficulty walking    • Diverticulosis    • Drug therapy    • Environmental allergies    • Fractures    • Headache, tension-type    • Hyperlipidemia    • Hypertension    • Low back pain    • Neuropathy in diabetes    • Obesity    • Scoliosis    • Seizures    • Sleep apnea    • Tremor    • Urinary tract infection    • Weakness      Past Surgical History:   Procedure Laterality Date   • BREAST BIOPSY     • CATARACT EXTRACTION, BILATERAL     • DENTAL PROCEDURE      Removed teeth   • FEMUR OPEN REDUCTION INTERNAL FIXATION Left 12/24/2020    Procedure: DISTAL FEMUR OPEN REDUCTION INTERNAL FIXATION;  Surgeon: Marley Hernandez MD;  Location: [facility];  Service: Orthopedics;  Laterality: Left;   • MASTECTOMY Left    • REPLACEMENT TOTAL KNEE BILATERAL       Family History   Problem Relation Age of Onset   • Arthritis Mother    • Lung cancer Mother    • Brain cancer Mother    • Hypertension Mother    • Cancer Mother         Lung cancer   • Hypertension Father    • Arthritis Sister    • Breast cancer Sister    • Diabetes Sister    • Hypertension Sister    • Dementia Sister    • Cancer Sister         Brest cancer   • Aneurysm Sister    • Cancer Daughter         Colon cancer   • Developmental Disability Daughter    • Learning disabilities Daughter    • Miscarriages / Stillbirths Daughter         Miscarriage     Social History     Tobacco Use   • Smoking status: Never     Passive exposure: Never   • Smokeless tobacco: Never   • Tobacco comments:     no caffine   Vaping Use   • Vaping status: Never Used   Substance  "medications per week.       Patient has a longstanding history of depression and anxiety since her childhood.  However recently she feels her anxiety is worse, in the past her depression was worse.    There was a death in her family and she has had several panic attacks since then.    Has been on Zoloft and Wellbutrin for about 5 years.  Doing okay on Zoloft but unable to increase Wellbutrin due to nausea.  Wants to change the medication to something else.    Having panic attacks about 1 time a week.    She has no personal history of bipolar illness but her mother has bipolar illness.      Patient has been to therapy 2 years ago but right now cannot go for therapy again.                   Review of Systems  Constitutional, HEENT, cardiovascular, pulmonary, GI, , musculoskeletal, neuro, skin, endocrine and psych systems are negative, except as otherwise noted.      Objective    /85   Pulse 77   Temp 97.9  F (36.6  C) (Temporal)   Ht 1.626 m (5' 4\")   Wt 95.5 kg (210 lb 8 oz)   LMP 03/19/2024 (Approximate)   SpO2 97%   BMI 36.13 kg/m    Body mass index is 36.13 kg/m .  Physical Exam   GENERAL: alert and no distress  NECK: no adenopathy, no asymmetry, masses, or scars  RESP: lungs clear to auscultation - no rales, rhonchi or wheezes  CV: regular rate and rhythm, normal S1 S2, no S3 or S4, no murmur, click or rub, no peripheral edema  ABDOMEN: soft, nontender, no hepatosplenomegaly, no masses and bowel sounds normal  MS: no gross musculoskeletal defects noted, no edema  Multiple tattoos          Signed Electronically by: Jimmie Ferguson MD    " Use Topics   • Alcohol use: Not Currently   • Drug use: Never     No current facility-administered medications on file prior to encounter.     Current Outpatient Medications on File Prior to Encounter   Medication Sig Dispense Refill   • acetaminophen (TYLENOL) 325 MG tablet Take 2 tablets by mouth Every 4 (Four) Hours As Needed for Mild Pain .     • albuterol sulfate  (90 Base) MCG/ACT inhaler INHALE 2 PUFFS BY MOUTH EVERY 4 HOURS AS NEEDED FOR WHEEZE 18 g 1   • apixaban (Eliquis) 5 MG tablet tablet Take 1 tablet by mouth Every 12 (Twelve) Hours. 60 tablet 2   • Cholecalciferol (Vitamin D3) 25 MCG (1000 UT) capsule Take 1 capsule by mouth Daily.     • citalopram (CeleXA) 20 MG tablet TAKE 1 TABLET BY MOUTH EVERY DAY 90 tablet 1   • CRANBERRY PO Take 650 mg by mouth.     • fenofibrate 160 MG tablet TAKE 1 TABLET BY MOUTH EVERY DAY 90 tablet 1   • hydroCHLOROthiazide (MICROZIDE) 12.5 MG capsule Take 1 capsule by mouth Daily. 90 capsule 3   • latanoprost (XALATAN) 0.005 % ophthalmic solution Administer 1 drop to both eyes every night at bedtime.     • lidocaine (LIDODERM) 5 % Place 1 patch on the skin as directed by provider Daily. Remove & Discard patch within 12 hours or as directed by MD 15 each 0   • LORazepam (Ativan) 0.5 MG tablet Take 0.5 mg 1/2 hr before the procedure , can repeat  again 2 tablet 0   • metFORMIN (GLUCOPHAGE) 1000 MG tablet TAKE 1 TABLET BY MOUTH TWICE A DAY WITH FOOD 180 tablet 0   • metoprolol tartrate (LOPRESSOR) 25 MG tablet TAKE 1 TABLET BY MOUTH TWICE A  tablet 1   • montelukast (SINGULAIR) 10 MG tablet TAKE 1 TABLET BY MOUTH EVERYDAY AT BEDTIME 90 tablet 1   • olmesartan (Benicar) 20 MG tablet Take 1 tablet by mouth Daily. 90 tablet 0   • tiZANidine (ZANAFLEX) 4 MG tablet Take 1 tablet by mouth At Night As Needed for Muscle Spasms. 30 tablet 1   • traMADol (ULTRAM) 50 MG tablet Take 1 tablet by mouth Every 8 (Eight) Hours As Needed for Moderate Pain or Severe Pain. 30  tablet 0   • Trelegy Ellipta 100-62.5-25 MCG/ACT inhaler INHALE 1 PUFF DAILY 60 each 1   • clotrimazole-betamethasone (LOTRISONE) 1-0.05 % cream Apply 1 Application topically to the appropriate area as directed 2 (Two) Times a Day. Use sparingly keep dry avoid chronic use 45 g 2   • furosemide (LASIX) 40 MG tablet Take 0.5 tablets by mouth Daily As Needed (Take a dose of Lasix if you notice a 3 pound weight gain, increasing leg swelling, or an increase in your oxygen requirement.).     • hydrocortisone 2.5 % ointment APPLY TO FACE TWICE A DAY AS NEEDED     • ipratropium-albuterol (DUO-NEB) 0.5-2.5 mg/3 ml nebulizer Take 3 mL by nebulization Every 4 (Four) Hours As Needed for Wheezing. 150 mL 3   • ketoconazole (NIZORAL) 2 % shampoo      • Magnesium Oxide -Mg Supplement 400 (240 Mg) MG tablet      • methylPREDNISolone (MEDROL) 4 MG dose pack Take as directed on package instructions. 21 each 0   • mupirocin (BACTROBAN) 2 % nasal ointment Administer 1 Application into the nostril(s) as directed by provider 2 (Two) Times a Day. 22 g 1   • nystatin (MYCOSTATIN) 915704 UNIT/GM powder Apply  topically to the appropriate area as directed 2 (Two) Times a Day. 60 g 1   • potassium chloride (KLOR-CON) 10 MEQ CR tablet Take 1 tablet by mouth Daily. 90 tablet 0   • primidone (MYSOLINE) 50 MG tablet TAKE 1 TABLET BY MOUTH THREE TIMES A  tablet 0   • Probiotic Product (PROBIOTIC-10 PO) Take  by mouth. (Patient not taking: Reported on 9/17/2024)       Allergies   Allergen Reactions   • Baclofen Other (See Comments)     dysrhythmia   • Doxycycline Rash   • Egg-Derived Products Anaphylaxis   • Iodine Anaphylaxis   • Latex Anaphylaxis   • Milk-Related Compounds Unknown - High Severity   • Msg [Monosodium Glutamate] Anaphylaxis   • Penicillins Hives     Tolerated cefazolin during December 2020 admission   • Metronidazole Unknown - High Severity   • Ezetimibe Rash   • Latex, Natural Rubber Rash   • Levaquin [Levofloxacin] Rash    • Nylon Rash   • Simvastatin Rash       Objective    Objective     Vital Signs  Temp:  [97.5 °F (36.4 °C)-99.1 °F (37.3 °C)] 98.4 °F (36.9 °C)  Heart Rate:  [49-66] 66  Resp:  [16-18] 18  BP: (136-195)/() 173/56  SpO2:  [92 %-100 %] 98 %  on  Flow (L/min):  [3] 3;   Device (Oxygen Therapy): nasal cannula  Body mass index is 34.61 kg/m².    Physical Exam  Constitutional:       Appearance: She is obese.      Comments: Appears in pain   HENT:      Head: Normocephalic and atraumatic.      Mouth/Throat:      Mouth: Mucous membranes are moist.   Eyes:      Extraocular Movements: Extraocular movements intact.      Conjunctiva/sclera: Conjunctivae normal.      Pupils: Pupils are equal, round, and reactive to light.   Cardiovascular:      Rate and Rhythm: Normal rate and regular rhythm.      Pulses: Normal pulses.      Heart sounds: Normal heart sounds.   Pulmonary:      Effort: Pulmonary effort is normal.      Breath sounds: Normal breath sounds.   Abdominal:      General: Bowel sounds are normal.      Palpations: Abdomen is soft.   Musculoskeletal:         General: No swelling or tenderness.      Cervical back: Normal range of motion.   Skin:     General: Skin is warm and dry.   Neurological:      General: No focal deficit present.      Mental Status: She is alert and oriented to person, place, and time.         Results Review:  I reviewed the patient's new clinical results.      Lab Results (last 24 hours)       Procedure Component Value Units Date/Time    CBC & Differential [[id number]]  (Abnormal) Collected: 10/07/24 1953    Specimen: Blood Updated: 10/07/24 2017    Narrative:      The following orders were created for panel order CBC & Differential.  Procedure                               Abnormality         Status                     ---------                               -----------         ------                     CBC Auto Differential[[id number]]        Abnormal            Final result                  Please view results for these tests on the individual orders.    Comprehensive Metabolic Panel [[id number]]  (Abnormal) Collected: 10/07/24 1953    Specimen: Blood Updated: 10/07/24 2026     Glucose 157 mg/dL      BUN 35 mg/dL      Creatinine 0.94 mg/dL      Sodium 135 mmol/L      Potassium 4.0 mmol/L      Chloride 95 mmol/L      CO2 30.5 mmol/L      Calcium 11.0 mg/dL      Total Protein 7.3 g/dL      Albumin 4.7 g/dL      ALT (SGPT) 11 U/L      AST (SGOT) 15 U/L      Alkaline Phosphatase 41 U/L      Total Bilirubin 0.4 mg/dL      Globulin 2.6 gm/dL      A/G Ratio 1.8 g/dL      BUN/Creatinine Ratio 37.2     Anion Gap 9.5 mmol/L      eGFR 63.4 mL/min/1.73     Narrative:      GFR Normal >60  Chronic Kidney Disease <60  Kidney Failure <15    The GFR formula is only valid for adults with stable renal function between ages 18 and 70.    CBC Auto Differential [[id number]]  (Abnormal) Collected: 10/07/24 1953    Specimen: Blood Updated: 10/07/24 2017     WBC 6.07 10*3/mm3      RBC 4.42 10*6/mm3      Hemoglobin 13.6 g/dL      Hematocrit 40.0 %      MCV 90.5 fL      MCH 30.8 pg      MCHC 34.0 g/dL      RDW 12.9 %      RDW-SD 42.8 fl      MPV 11.0 fL      Platelets 178 10*3/mm3      Neutrophil % 60.5 %      Lymphocyte % 30.0 %      Monocyte % 7.6 %      Eosinophil % 1.0 %      Basophil % 0.2 %      Immature Grans % 0.7 %      Neutrophils, Absolute 3.68 10*3/mm3      Lymphocytes, Absolute 1.82 10*3/mm3      Monocytes, Absolute 0.46 10*3/mm3      Eosinophils, Absolute 0.06 10*3/mm3      Basophils, Absolute 0.01 10*3/mm3      Immature Grans, Absolute 0.04 10*3/mm3      nRBC 0.0 /100 WBC     Basic Metabolic Panel [[id number]]  (Abnormal) Collected: 10/08/24 0233    Specimen: Blood Updated: 10/08/24 0423     Glucose 138 mg/dL      BUN 38 mg/dL      Creatinine 0.93 mg/dL      Sodium 138 mmol/L      Potassium 3.9 mmol/L      Chloride 96 mmol/L      CO2 32.7 mmol/L      Calcium 10.4 mg/dL      BUN/Creatinine Ratio 40.9     Anion Gap  9.3 mmol/L      eGFR 64.2 mL/min/1.73     Narrative:      GFR Normal >60  Chronic Kidney Disease <60  Kidney Failure <15    The GFR formula is only valid for adults with stable renal function between ages 18 and 70.    CBC (No Diff) [[id number]]  (Normal) Collected: 10/08/24 0233    Specimen: Blood Updated: 10/08/24 0358     WBC 5.70 10*3/mm3      RBC 4.06 10*6/mm3      Hemoglobin 12.7 g/dL      Hematocrit 37.1 %      MCV 91.4 fL      MCH 31.3 pg      MCHC 34.2 g/dL      RDW 13.1 %      RDW-SD 44.2 fl      MPV 10.9 fL      Platelets 166 10*3/mm3     POC Glucose Once [[id number]]  (Abnormal) Collected: 10/08/24 0301    Specimen: Blood Updated: 10/08/24 0303     Glucose 156 mg/dL     POC Glucose Once [[id number]]  (Abnormal) Collected: 10/08/24 1138    Specimen: Blood Updated: 10/08/24 1143     Glucose 186 mg/dL             Imaging Results (Last 24 Hours)       Procedure Component Value Units Date/Time    CT Lumbar Spine Without Contrast [[id number]] Collected: 10/07/24 2304     Updated: 10/07/24 2304    Narrative:        Patient: RAFAEL MATHIAS  Time Out: 23:04  Exam(s): CT L SPINE     EXAM:    CT Lumbar Spine Without Intravenous Contrast    CLINICAL HISTORY:      low back pain w o sciatica.    TECHNIQUE:    Axial computed tomography images of the lumbar spine without   intravenous contrast.  CTDI is 25.36 mGy and DLP is 551.9 mGy-cm.  This   CT exam was performed according to the principle of ALARA (As Low As   Reasonably Achievable) by using one or more of the following dose   reduction techniques: automated exposure control, adjustment of the mA   and or kV according to patient size, and or use of iterative   reconstruction technique.    COMPARISON:    No relevant prior studies available.    FINDINGS:    Vertebrae:  The vertebral body heights are maintained without acute   compression fracture.  There is 4-5 mm retrolisthesis of L1 on L2.    Scoliosis, convex right is noted throughout the lumbar spine.  The    pedicles, facet joints, spinous processes and transverse processes are   intact.  Diffuse facet hypertrophic changes noted throughout the lumbar   spine bilaterally. Hypertrophic changes noted involving the sacroiliac   joints, left greater than right.    Discs spinal canal neural foramina: Severe disc spondylosis with   narrowing, hypertrophic changes and vacuum phenomenon identified   throughout the lumbar spine.  Evaluation the central canal is limited   without intrathecal contrast.  However, no obvious posterior disc   protrusion. Mild to moderate multilevel osseous neural foraminal   encroachment noted, left side greater than right.    Soft tissues:  No significant acute traumatic soft tissue abnormality   identified.    IMPRESSION:         No acute osseous traumatic injury or significant abnormal alignment   involving the lumbar spine.  Severe multilevel degenerative changes   throughout the lumbar spine, as detailed above.  No obvious posterior   disc protrusion.  However, there is moderate multilevel neural foraminal   encroachment noted, left side greater than right.    Impression:          Electronically signed by José Christian MD on 10-07-24 at 2304            Results for orders placed during the hospital encounter of 11/29/23    Adult Transthoracic Echo Complete W/ Cont if Necessary Per Protocol    Interpretation Summary  •  Left ventricular systolic function is normal. Left ventricular ejection fraction appears to be 56 - 60%.  •  Left ventricular wall thickness is consistent with mild to moderate concentric hypertrophy.  •  Normal right ventricular cavity size and systolic function noted.  •  The left atrial cavity is mild to moderately dilated.  •  The aortic valve leaflets are moderately calcified  •  Mild aortic valve regurgitation is present. No aortic valve stenosis is present  •  There is moderate mitral annular calcification  •  Mild mitral valve regurgitation is present  •  Calculated  right ventricular systolic pressure from tricuspid regurgitation is 21 mmHg.  •  There is a small (<1cm) circumferential pericardial effusion. There is no evidence of cardiac tamponade.      ECG 12 Lead Bradycardia   Preliminary Result   HEART RATE=51  bpm   RR Kdxtiqfo=6237  ms   AR Jtyxtuoo=186  ms   P Horizontal Axis=0  deg   P Front Axis=26  deg   QRSD Uhwqdxzl=567  ms   QT Mylnmwcy=270  ms   ZPqE=788  ms   QRS Axis=-3  deg   T Wave Axis=105  deg   - ABNORMAL ECG -   Sinus rhythm   Prolonged AR interval   LVH with IVCD and secondary repol abnrm   Date and Time of Study:2024-10-08 04:54:49      Telemetry Scan   Final Result           Assessment/Plan     Active Hospital Problems    Diagnosis  POA   • **Low back pain [M54.50]  Yes   • Type 2 diabetes mellitus with hyperglycemia, without long-term current use of insulin [E11.65]  Yes   • Bradycardia [R00.1]  Unknown   • COPD (chronic obstructive pulmonary disease) [J44.9]  Yes   • Chronic midline low back pain without sciatica [M54.50, G89.29]  Yes   • History of seizure [Z87.898]  Yes   • PAF (paroxysmal atrial fibrillation) [I48.0]  Yes   • TARSHA (obstructive sleep apnea) [G47.33]  Yes   • Essential hypertension [I10]  Yes      Resolved Hospital Problems   No resolved problems to display.     MRI of the spine pending.  Consult neurosurgery.  Pain control.  Eliquis on hold in anticipation of intervention.    Continue Symbicort and Spiriva substituted for Trelegy.  Continue albuterol nebs.    A1c 7.2 back in July.  Hold p.o. diabetic regimen and cover glucose with low-dose lispro SSI.    Metoprolol on hold per cardiology.  Appreciate their input regarding bradycardia and pauses.      Continue losartan and hydrochlorothiazide.    Continue Celexa.    Continue primidone.    Continue Singulair.    Continue vitamin D.        I discussed the patient's findings and my recommendations with patient.    VTE Prophylaxis - SCDs.  Code Status - Full code.       Sil Crow  MARK Gordon  [facility]  10/08/24  14:33 EDT

## 2024-10-15 ENCOUNTER — OFFICE VISIT (OUTPATIENT)
Dept: FAMILY MEDICINE | Facility: CLINIC | Age: 29
End: 2024-10-15
Payer: COMMERCIAL

## 2024-10-15 VITALS
BODY MASS INDEX: 37.22 KG/M2 | RESPIRATION RATE: 18 BRPM | HEIGHT: 64 IN | WEIGHT: 218 LBS | HEART RATE: 88 BPM | DIASTOLIC BLOOD PRESSURE: 88 MMHG | TEMPERATURE: 98.2 F | SYSTOLIC BLOOD PRESSURE: 128 MMHG | OXYGEN SATURATION: 97 %

## 2024-10-15 DIAGNOSIS — Z20.2 EXPOSURE TO TRICHOMONAS: ICD-10-CM

## 2024-10-15 DIAGNOSIS — Z23 ENCOUNTER FOR VACCINATION: ICD-10-CM

## 2024-10-15 DIAGNOSIS — Z11.3 SCREENING EXAMINATION FOR VENEREAL DISEASE: Primary | ICD-10-CM

## 2024-10-15 LAB
CLUE CELLS: PRESENT
HIV 1+2 AB+HIV1 P24 AG SERPL QL IA: NONREACTIVE
T PALLIDUM AB SER QL: NONREACTIVE
TRICHOMONAS, WET PREP: PRESENT
WBC'S/HIGH POWER FIELD, WET PREP: ABNORMAL
YEAST, WET PREP: ABNORMAL

## 2024-10-15 PROCEDURE — 91320 SARSCV2 VAC 30MCG TRS-SUC IM: CPT | Performed by: FAMILY MEDICINE

## 2024-10-15 PROCEDURE — 87491 CHLMYD TRACH DNA AMP PROBE: CPT | Performed by: FAMILY MEDICINE

## 2024-10-15 PROCEDURE — 87591 N.GONORRHOEAE DNA AMP PROB: CPT | Performed by: FAMILY MEDICINE

## 2024-10-15 PROCEDURE — 87210 SMEAR WET MOUNT SALINE/INK: CPT | Performed by: FAMILY MEDICINE

## 2024-10-15 PROCEDURE — 90480 ADMN SARSCOV2 VAC 1/ONLY CMP: CPT | Performed by: FAMILY MEDICINE

## 2024-10-15 PROCEDURE — 99213 OFFICE O/P EST LOW 20 MIN: CPT | Mod: 25 | Performed by: FAMILY MEDICINE

## 2024-10-15 PROCEDURE — 90471 IMMUNIZATION ADMIN: CPT | Performed by: FAMILY MEDICINE

## 2024-10-15 PROCEDURE — 87389 HIV-1 AG W/HIV-1&-2 AB AG IA: CPT | Performed by: FAMILY MEDICINE

## 2024-10-15 PROCEDURE — 90656 IIV3 VACC NO PRSV 0.5 ML IM: CPT | Performed by: FAMILY MEDICINE

## 2024-10-15 PROCEDURE — 36415 COLL VENOUS BLD VENIPUNCTURE: CPT | Performed by: FAMILY MEDICINE

## 2024-10-15 PROCEDURE — 86780 TREPONEMA PALLIDUM: CPT | Performed by: FAMILY MEDICINE

## 2024-10-15 RX ORDER — METRONIDAZOLE 500 MG/1
500 TABLET ORAL 2 TIMES DAILY
Qty: 14 TABLET | Refills: 0 | Status: SHIPPED | OUTPATIENT
Start: 2024-10-15 | End: 2024-10-22

## 2024-10-15 ASSESSMENT — PATIENT HEALTH QUESTIONNAIRE - PHQ9
10. IF YOU CHECKED OFF ANY PROBLEMS, HOW DIFFICULT HAVE THESE PROBLEMS MADE IT FOR YOU TO DO YOUR WORK, TAKE CARE OF THINGS AT HOME, OR GET ALONG WITH OTHER PEOPLE: NOT DIFFICULT AT ALL
SUM OF ALL RESPONSES TO PHQ QUESTIONS 1-9: 3
SUM OF ALL RESPONSES TO PHQ QUESTIONS 1-9: 3

## 2024-10-15 NOTE — PATIENT INSTRUCTIONS
Safer Sex: Care Instructions  Overview  Safer sex is a way to reduce your risk of getting a sexually transmitted infection (STI). It can also help prevent pregnancy.  Several products can help you practice safer sex and reduce your chance of STIs. One of the best is a condom. There are internal and external condoms. You can use a special rubber sheet (dental dam) for protection during oral sex. Disposable gloves can keep your hands from touching blood, semen, or other body fluids that can carry infections.  Remember that birth control methods such as diaphragms, IUDs, foams, and birth control pills do not stop you from getting STIs.  Follow-up care is a key part of your treatment and safety. Be sure to make and go to all appointments, and call your doctor if you are having problems. It's also a good idea to know your test results and keep a list of the medicines you take.  How can you care for yourself at home?  Think about getting vaccinated to help prevent hepatitis A, hepatitis B, and human papillomavirus (HPV). They can be spread through sex.  Use a condom every time you have sex. Use an external condom, which goes on the penis. Or use an internal condom, which goes into the vagina or anus.  Make sure you use the right size external condom. A condom that's too small can break easily. A condom that's too big can slip off during sex.  Use a new condom each time you have sex. Be careful not to poke a hole in the condom when you open the wrapper.  Don't use an internal condom and an external condom at the same time.  Never use petroleum jelly (such as Vaseline), grease, hand lotion, baby oil, or anything with oil in it. These products can make holes in the condom.  After intercourse, hold the edge of the condom as you remove it. This will help keep semen from spilling out of the condom.  Do not have sex with anyone who has symptoms of an STI, such as sores on the genitals or mouth.  Do not drink a lot of alcohol or  "use drugs before sex.  Limit your sex partners. Sex with one partner who has sex only with you can reduce your risk of getting an STI.  Don't share sex toys. But if you do share them, use a condom and clean the sex toys between each use.  Talk to any partners before you have sex. Talk about what you feel comfortable with and whether you have any boundaries with sex. And find out if your partner or partners may be at risk for any STI. Keep in mind that a person may be able to spread an STI even if they do not have symptoms. You and any partners may want to get tested for STIs.  Where can you learn more?  Go to https://www.[employer].net/patiented  Enter B608 in the search box to learn more about \"Safer Sex: Care Instructions.\"  Current as of: November 27, 2023  Content Version: 14.2 2024 [facility] [employer].   Care instructions adapted under license by your healthcare professional. If you have questions about a medical condition or this instruction, always ask your healthcare professional. [employer] disclaims any warranty or liability for your use of this information.    "

## 2024-10-15 NOTE — PROGRESS NOTES
"  Assessment & Plan     Screening examination for venereal disease  - Vaginal-Chlamydia trachomatis/Neisseria gonorrhoeae by PCR  - HIV Antigen Antibody Combo Cascade  - Treponema Abs w Reflex to RPR and Titer  - Vagina-Wet preparation    Exposure to trichomonas  -Partner recently diagnosed with infection and currently being treated.  -Will treat pt with metronidazole, side effects discussed. Refrain from etoh use on abx.   -No sexually activity for 7 days until treatment completed and all partners are treated.   - metroNIDAZOLE (FLAGYL) 500 MG tablet  Dispense: 14 tablet; Refill: 0    Encounter for vaccination  - INFLUENZA VACCINE,SPLIT VIRUS,TRIVALENT,PF(FLUZONE)  - COVID-19 12+ (PFIZER)            BMI  Estimated body mass index is 37.68 kg/m  as calculated from the following:    Height as of this encounter: 1.62 m (5' 3.78\").    Weight as of this encounter: 98.9 kg (218 lb).             Mackenzie Whitney is a 29 year old, presenting for the following health issues:  STD      10/15/2024    10:00 AM   Additional Questions   Roomed by CHAPIS     STD    History of Present Illness       Reason for visit:  STD/STI testing She is missing 1 dose(s) of medications per week.  She is not taking prescribed medications regularly due to remembering to take.     Sexually active with male partners.   Partner with trichomonas and is being treated.  Pt has not symptoms currently.                  Objective    /88 (BP Location: Right arm, Patient Position: Sitting, Cuff Size: Adult Large)   Pulse 88   Temp 98.2  F (36.8  C) (Temporal)   Resp 18   Ht 1.62 m (5' 3.78\")   Wt 98.9 kg (218 lb)   LMP 09/13/2024 (Approximate)   SpO2 97%   BMI 37.68 kg/m    Body mass index is 37.68 kg/m .  Physical Exam   GENERAL: alert and no distress  PSYCH: mentation appears normal, affect normal/bright            Signed Electronically by: Gayr Pulido DO    "

## 2024-10-16 LAB
C TRACH DNA SPEC QL PROBE+SIG AMP: NEGATIVE
N GONORRHOEA DNA SPEC QL NAA+PROBE: NEGATIVE

## 2024-10-24 ASSESSMENT — PATIENT HEALTH QUESTIONNAIRE - PHQ9: SUM OF ALL RESPONSES TO PHQ QUESTIONS 1-9: 14

## 2024-11-20 ENCOUNTER — MYC MEDICAL ADVICE (OUTPATIENT)
Dept: INTERNAL MEDICINE | Facility: CLINIC | Age: 29
End: 2024-11-20
Payer: COMMERCIAL

## 2024-11-21 NOTE — TELEPHONE ENCOUNTER
Provider, please read the patient My Chart message and advise the triage staff.     Safia Schuler RN  [facility]

## 2025-07-20 ENCOUNTER — E-VISIT (OUTPATIENT)
Dept: INTERNAL MEDICINE | Facility: CLINIC | Age: 30
End: 2025-07-20
Payer: COMMERCIAL

## 2025-07-20 DIAGNOSIS — N39.0 ACUTE UTI (URINARY TRACT INFECTION): Primary | ICD-10-CM

## 2025-07-20 PROCEDURE — 99207 PR NON-BILLABLE SERV PER CHARTING: CPT | Performed by: INTERNAL MEDICINE

## 2025-07-21 ENCOUNTER — MYC MEDICAL ADVICE (OUTPATIENT)
Dept: INTERNAL MEDICINE | Facility: CLINIC | Age: 30
End: 2025-07-21
Payer: COMMERCIAL

## 2025-07-22 RX ORDER — SULFAMETHOXAZOLE AND TRIMETHOPRIM 800; 160 MG/1; MG/1
1 TABLET ORAL 2 TIMES DAILY
Qty: 6 TABLET | Refills: 0 | Status: SHIPPED | OUTPATIENT
Start: 2025-07-22 | End: 2025-07-25

## 2025-07-22 NOTE — PATIENT INSTRUCTIONS
Dear Chelo Garcia    After reviewing your responses, I've been able to diagnose you with a urinary tract infection, which is a common infection of the bladder with bacteria.  This is not a sexually transmitted infection, though urinating immediately after intercourse can help prevent infections.  Drinking lots of fluids is also helpful to clear your current infection and prevent the next one.      I have sent a prescription for antibiotics to your pharmacy to treat this infection.    It is important that you take all of your prescribed medication even if your symptoms are improving after a few doses.  Taking all of your medicine helps prevent the symptoms from returning.     If your symptoms worsen, you develop pain in your back or stomach, develop fevers, or are not improving in 5 days, please contact your primary care provider for an appointment or visit any of our convenient Walk-in or Urgent Care Centers to be seen, which can be found on our website here.    Thanks again for choosing us as your health care partner,    Lara Jefferson MD

## (undated) DEVICE — STPL SKIN SUBCUTICULAR INSORB  2030

## (undated) DEVICE — GLOVE PROTEXIS POWDER FREE 7.0 ORTHOPEDIC 2D73ET70

## (undated) DEVICE — ESU GROUND PAD ADULT W/CORD E7507

## (undated) DEVICE — LINEN HALF SHEET 5512

## (undated) DEVICE — GLOVE PROTEXIS POWDER FREE 6.0 ORTHOPEDIC 2D73ET60

## (undated) DEVICE — PREP CHLORAPREP 26ML TINTED HI-LITE ORANGE 930815

## (undated) DEVICE — SU PLAIN GUT 3-0 XTX 27" 873

## (undated) DEVICE — STOCKING SLEEVE VASOPRESS COMPRESSION CALF LG 24" VP501L

## (undated) DEVICE — LINEN DRAPE 54X72" 5467

## (undated) DEVICE — SOL WATER IRRIG 1000ML BOTTLE 2F7114

## (undated) DEVICE — BAG YELLOW TRASH 44GAL 37X50" A7450PY

## (undated) DEVICE — LINEN FULL SHEET 5511

## (undated) DEVICE — LINEN BABY BLANKET 5434

## (undated) DEVICE — LINEN TOWEL PACK X10 5473

## (undated) DEVICE — SU VICRYL 3-0 CT-1 36" J344H

## (undated) DEVICE — DRSG TELFA ISLAND 4X10"

## (undated) DEVICE — PACK C-SECTION LF PL15OTA83B

## (undated) DEVICE — BAG CLEAR TRASH 1.3M 39X33" P4040C

## (undated) DEVICE — ADH SKIN CLOSURE PREMIERPRO EXOFIN 1.0ML 3470

## (undated) DEVICE — SOL NACL 0.9% IRRIG 1000ML BOTTLE 2F7124

## (undated) DEVICE — TRANSFER DEVICE BLOOD NDL HOLDER 364880

## (undated) DEVICE — DRSG ABDOMINAL 07 1/2X8" 7197D

## (undated) DEVICE — CATH TRAY FOLEY SURESTEP 16FR DRAIN BAG STATOCK A899916

## (undated) RX ORDER — LIDOCAINE HYDROCHLORIDE 20 MG/ML
INJECTION, SOLUTION EPIDURAL; INFILTRATION; INTRACAUDAL; PERINEURAL
Status: DISPENSED
Start: 2021-06-29

## (undated) RX ORDER — MORPHINE SULFATE 1 MG/ML
INJECTION, SOLUTION EPIDURAL; INTRATHECAL; INTRAVENOUS
Status: DISPENSED
Start: 2021-06-29

## (undated) RX ORDER — DIPHENHYDRAMINE HYDROCHLORIDE 50 MG/ML
INJECTION INTRAMUSCULAR; INTRAVENOUS
Status: DISPENSED
Start: 2021-06-29

## (undated) RX ORDER — FENTANYL CITRATE 50 UG/ML
INJECTION, SOLUTION INTRAMUSCULAR; INTRAVENOUS
Status: DISPENSED
Start: 2021-06-29

## (undated) RX ORDER — OXYTOCIN/0.9 % SODIUM CHLORIDE 30/500 ML
PLASTIC BAG, INJECTION (ML) INTRAVENOUS
Status: DISPENSED
Start: 2021-06-29